# Patient Record
Sex: FEMALE | Race: OTHER
[De-identification: names, ages, dates, MRNs, and addresses within clinical notes are randomized per-mention and may not be internally consistent; named-entity substitution may affect disease eponyms.]

---

## 2017-06-08 ENCOUNTER — TRANSCRIPTION ENCOUNTER (OUTPATIENT)
Age: 52
End: 2017-06-08

## 2017-06-21 ENCOUNTER — TRANSCRIPTION ENCOUNTER (OUTPATIENT)
Age: 52
End: 2017-06-21

## 2018-04-13 ENCOUNTER — OUTPATIENT (OUTPATIENT)
Dept: OUTPATIENT SERVICES | Facility: HOSPITAL | Age: 53
LOS: 1 days | Discharge: HOME | End: 2018-04-13

## 2018-04-22 ENCOUNTER — TRANSCRIPTION ENCOUNTER (OUTPATIENT)
Age: 53
End: 2018-04-22

## 2018-06-13 ENCOUNTER — TRANSCRIPTION ENCOUNTER (OUTPATIENT)
Age: 53
End: 2018-06-13

## 2018-12-20 ENCOUNTER — MOBILE ON CALL (OUTPATIENT)
Age: 53
End: 2018-12-20

## 2019-01-08 ENCOUNTER — APPOINTMENT (OUTPATIENT)
Dept: GASTROENTEROLOGY | Facility: CLINIC | Age: 54
End: 2019-01-08
Payer: COMMERCIAL

## 2019-01-08 VITALS
WEIGHT: 169 LBS | SYSTOLIC BLOOD PRESSURE: 114 MMHG | BODY MASS INDEX: 29.95 KG/M2 | HEART RATE: 94 BPM | HEIGHT: 63 IN | DIASTOLIC BLOOD PRESSURE: 70 MMHG | OXYGEN SATURATION: 95 %

## 2019-01-08 DIAGNOSIS — Z78.9 OTHER SPECIFIED HEALTH STATUS: ICD-10-CM

## 2019-01-08 DIAGNOSIS — Z83.3 FAMILY HISTORY OF DIABETES MELLITUS: ICD-10-CM

## 2019-01-08 PROCEDURE — 99214 OFFICE O/P EST MOD 30 MIN: CPT

## 2019-01-08 NOTE — HISTORY OF PRESENT ILLNESS
[FreeTextEntry1] :  52 yo f with PMR on daily Prednisone , hypothyroidism, celiac disease, h/o colon polyps, h/o GERD. \par        presents for follow up.\par I saw her in Sept 2018 when she complained of worsening reflux. She was also less compliant with her gluten free diet at that time. Celiac serologies were elevated. \par \par EGD 10/19/18 for GERD and celiac,-small HH, and irregular zline\par duodenal bx- increase IEL\par gastric- chronic HP negative gastritis\par distal esophagus-c/w reflux\par proximal esophagus- no EoE \par \par Today, she complains of ongoing intermittent significant reflux that interferes with sleep. \par She has only been gluten free for past week. She takes ranitidine 150 mg at bedtime. \par She started methotrexate for PMR on  12/14, Prednisone also increased to 10 mg in December. \par Takes Advil PM at night to sleep 2x per week. \par still smoking\par no longer drinking alcohol. \par vomits on the night after taking methotrexate. \par more trouble with  constipation, takes Dulcolax PRN. \par no abd pain, brbpr/melena/weight loss\par \par   \par \par  \par         EGD/Colonoscopy 07/2017- \par         small hiatal hernia\par         LA class A esophagitis\par         colon polyps\par         diverticulosis\par         hemorrhoids \par         Rec: repeat colonoscopy in 3 years. \par         previous records:\par         labs 6/12/12-\par         DGP IgG-17, DGP IgA 6.3, TTG 43, anti- endomysial-positive, serum IgA-211\par         DQ2 -homozygous.\par

## 2019-01-08 NOTE — ASSESSMENT
[FreeTextEntry1] : GERD\par Persistent reflux not improved with H2RA. Likely exacerbated by chronic prednisone and NSAID use. Advised she avoid NSAIDS, counseled on antireflux diet, maintain gluten free diet, will start PPI, plan to attempt to taper in 3 months. \par \par Celiac disease\par Advised strict adherence to gluten free diet. Will repeat celiac serologies in 2 weeks when patient has blood work with rheumatologist scheduled. \par \par Constipation\par Start daily fiber supplement, increase hydration, MiraLax and Dulcolax PRN\par \par History of colon polyps\par Due for repeat colonoscopy in 07/2020

## 2019-01-08 NOTE — PHYSICAL EXAM
[General Appearance - Alert] : alert [General Appearance - In No Acute Distress] : in no acute distress [Sclera] : the sclera and conjunctiva were normal [Outer Ear] : the ears and nose were normal in appearance [Neck Appearance] : the appearance of the neck was normal [] : no respiratory distress [Apical Impulse] : the apical impulse was normal [Abdomen Soft] : soft [Abdomen Tenderness] : non-tender [Abnormal Walk] : normal gait [Skin Color & Pigmentation] : normal skin color and pigmentation [Oriented To Time, Place, And Person] : oriented to person, place, and time

## 2019-01-18 ENCOUNTER — RECORD ABSTRACTING (OUTPATIENT)
Age: 54
End: 2019-01-18

## 2019-01-18 DIAGNOSIS — Z86.39 PERSONAL HISTORY OF OTHER ENDOCRINE, NUTRITIONAL AND METABOLIC DISEASE: ICD-10-CM

## 2019-01-18 DIAGNOSIS — Z86.010 PERSONAL HISTORY OF COLONIC POLYPS: ICD-10-CM

## 2019-01-22 ENCOUNTER — RX RENEWAL (OUTPATIENT)
Age: 54
End: 2019-01-22

## 2019-01-23 ENCOUNTER — APPOINTMENT (OUTPATIENT)
Dept: RHEUMATOLOGY | Facility: CLINIC | Age: 54
End: 2019-01-23
Payer: COMMERCIAL

## 2019-01-23 VITALS
WEIGHT: 162 LBS | DIASTOLIC BLOOD PRESSURE: 72 MMHG | SYSTOLIC BLOOD PRESSURE: 118 MMHG | HEIGHT: 63 IN | BODY MASS INDEX: 28.7 KG/M2

## 2019-01-23 PROCEDURE — 99213 OFFICE O/P EST LOW 20 MIN: CPT

## 2019-01-23 PROCEDURE — XXXXX: CPT

## 2019-01-24 LAB
25(OH)D3 SERPL-MCNC: 22.2 NG/ML
ALBUMIN SERPL ELPH-MCNC: 4.6 G/DL
ALP BLD-CCNC: 63 U/L
ALT SERPL-CCNC: 13 U/L
ANION GAP SERPL CALC-SCNC: 13 MMOL/L
AST SERPL-CCNC: 15 U/L
BASOPHILS # BLD AUTO: 0.01 K/UL
BASOPHILS NFR BLD AUTO: 0.1 %
BILIRUB SERPL-MCNC: 0.2 MG/DL
BUN SERPL-MCNC: 17 MG/DL
CALCIUM SERPL-MCNC: 10 MG/DL
CHLORIDE SERPL-SCNC: 103 MMOL/L
CO2 SERPL-SCNC: 27 MMOL/L
CREAT SERPL-MCNC: 0.73 MG/DL
CRP SERPL-MCNC: 0.34 MG/DL
EOSINOPHIL # BLD AUTO: 0.09 K/UL
EOSINOPHIL NFR BLD AUTO: 1.1 %
ERYTHROCYTE [SEDIMENTATION RATE] IN BLOOD BY WESTERGREN METHOD: 12 MM/HR
GLUCOSE SERPL-MCNC: 83 MG/DL
HCT VFR BLD CALC: 40.9 %
HGB BLD-MCNC: 13.4 G/DL
IGA SER QL IEP: 149 MG/DL
IMM GRANULOCYTES NFR BLD AUTO: 0.3 %
LYMPHOCYTES # BLD AUTO: 2.03 K/UL
LYMPHOCYTES NFR BLD AUTO: 25.4 %
MAN DIFF?: NORMAL
MCHC RBC-ENTMCNC: 29.7 PG
MCHC RBC-ENTMCNC: 32.8 GM/DL
MCV RBC AUTO: 90.7 FL
MONOCYTES # BLD AUTO: 0.41 K/UL
MONOCYTES NFR BLD AUTO: 5.1 %
NEUTROPHILS # BLD AUTO: 5.43 K/UL
NEUTROPHILS NFR BLD AUTO: 68 %
PLATELET # BLD AUTO: 237 K/UL
POTASSIUM SERPL-SCNC: 4.7 MMOL/L
PROT SERPL-MCNC: 7.2 G/DL
RBC # BLD: 4.51 M/UL
RBC # FLD: 14.7 %
SODIUM SERPL-SCNC: 143 MMOL/L
WBC # FLD AUTO: 7.99 K/UL

## 2019-01-25 LAB
ENDOMYSIUM IGA SER QL: NEGATIVE
ENDOMYSIUM IGA TITR SER: NORMAL
TTG IGA SER IA-ACNC: 21.9 UNITS
TTG IGA SER-ACNC: ABNORMAL
TTG IGG SER IA-ACNC: <5 UNITS
TTG IGG SER IA-ACNC: NEGATIVE
VIT B12 SERPL-MCNC: 835 PG/ML

## 2019-01-28 LAB
GLIADIN IGA SER QL: 14.1 UNITS
GLIADIN IGG SER QL: <5 UNITS
GLIADIN PEPTIDE IGA SER-ACNC: NEGATIVE
GLIADIN PEPTIDE IGG SER-ACNC: NEGATIVE

## 2019-02-18 ENCOUNTER — RX RENEWAL (OUTPATIENT)
Age: 54
End: 2019-02-18

## 2019-03-01 ENCOUNTER — APPOINTMENT (OUTPATIENT)
Dept: RHEUMATOLOGY | Facility: CLINIC | Age: 54
End: 2019-03-01
Payer: COMMERCIAL

## 2019-03-01 VITALS
SYSTOLIC BLOOD PRESSURE: 120 MMHG | DIASTOLIC BLOOD PRESSURE: 70 MMHG | WEIGHT: 160 LBS | HEIGHT: 63 IN | BODY MASS INDEX: 28.35 KG/M2

## 2019-03-01 PROCEDURE — 99214 OFFICE O/P EST MOD 30 MIN: CPT

## 2019-03-01 RX ORDER — METHOTREXATE 2.5 MG/1
2.5 TABLET ORAL WEEKLY
Qty: 91 | Refills: 0 | Status: DISCONTINUED | COMMUNITY
Start: 2019-01-23 | End: 2019-03-01

## 2019-03-06 ENCOUNTER — APPOINTMENT (OUTPATIENT)
Dept: GASTROENTEROLOGY | Facility: CLINIC | Age: 54
End: 2019-03-06
Payer: COMMERCIAL

## 2019-03-06 VITALS
SYSTOLIC BLOOD PRESSURE: 110 MMHG | BODY MASS INDEX: 28.35 KG/M2 | HEIGHT: 63 IN | WEIGHT: 160 LBS | DIASTOLIC BLOOD PRESSURE: 60 MMHG | HEART RATE: 84 BPM

## 2019-03-06 PROCEDURE — 99214 OFFICE O/P EST MOD 30 MIN: CPT

## 2019-03-06 RX ORDER — LORATADINE 5 MG/5 ML
10 SOLUTION, ORAL ORAL
Refills: 0 | Status: DISCONTINUED | COMMUNITY
End: 2019-03-06

## 2019-03-06 RX ORDER — DICLOFENAC SODIUM 1 %
1 KIT TOPICAL
Refills: 0 | Status: DISCONTINUED | COMMUNITY
End: 2019-03-06

## 2019-03-06 NOTE — ASSESSMENT
[FreeTextEntry1] : 1+2) vomiting, abd pain - possibly gastroparesis, PUD, r/o complications of celiac dz, concern for lymphoma, partial obstructing process --Will get CT scan now; increase nexium to BID, grazing diet.\par \par 3) Celiac - cont GFD - recent TTG improved vs prior\par \par 4) GERD - Reviewed dietary and lifestyle modifications, including weight loss, smaller/frequent/earlier (>3h prior to lying down) meals, trials of cutting down/out caffeine, chocolate, tomatoes, fatty foods, alcohol.  Inc nexium to BID\par \par 5) hx colon polyps - due 7/2020 per records\par \par F/U after CT scan and w/ Dr. Miller at least by phone next wk when she returns.

## 2019-03-08 ENCOUNTER — RESULT REVIEW (OUTPATIENT)
Age: 54
End: 2019-03-08

## 2019-03-08 ENCOUNTER — RX RENEWAL (OUTPATIENT)
Age: 54
End: 2019-03-08

## 2019-03-11 ENCOUNTER — RX RENEWAL (OUTPATIENT)
Age: 54
End: 2019-03-11

## 2019-03-14 ENCOUNTER — APPOINTMENT (OUTPATIENT)
Dept: GASTROENTEROLOGY | Facility: CLINIC | Age: 54
End: 2019-03-14
Payer: COMMERCIAL

## 2019-03-14 ENCOUNTER — LABORATORY RESULT (OUTPATIENT)
Age: 54
End: 2019-03-14

## 2019-03-14 VITALS
HEART RATE: 102 BPM | SYSTOLIC BLOOD PRESSURE: 110 MMHG | HEIGHT: 63 IN | BODY MASS INDEX: 28 KG/M2 | DIASTOLIC BLOOD PRESSURE: 70 MMHG | WEIGHT: 158 LBS

## 2019-03-14 PROCEDURE — 99214 OFFICE O/P EST MOD 30 MIN: CPT

## 2019-03-15 LAB
ALBUMIN SERPL ELPH-MCNC: 4.5 G/DL
ALP BLD-CCNC: 72 U/L
ALT SERPL-CCNC: 20 U/L
ANION GAP SERPL CALC-SCNC: 16 MMOL/L
AST SERPL-CCNC: 17 U/L
BASOPHILS # BLD AUTO: 0.05 K/UL
BASOPHILS NFR BLD AUTO: 0.6 %
BILIRUB SERPL-MCNC: <0.2 MG/DL
BUN SERPL-MCNC: 16 MG/DL
CALCIUM SERPL-MCNC: 9.3 MG/DL
CHLORIDE SERPL-SCNC: 101 MMOL/L
CO2 SERPL-SCNC: 24 MMOL/L
CREAT SERPL-MCNC: 0.8 MG/DL
EOSINOPHIL # BLD AUTO: 0.04 K/UL
EOSINOPHIL NFR BLD AUTO: 0.5 %
GLUCOSE SERPL-MCNC: 133 MG/DL
HCT VFR BLD CALC: 45.5 %
HGB BLD-MCNC: 14.2 G/DL
IMM GRANULOCYTES NFR BLD AUTO: 0.5 %
LPL SERPL-CCNC: 36 U/L
LYMPHOCYTES # BLD AUTO: 1.44 K/UL
LYMPHOCYTES NFR BLD AUTO: 16.4 %
MAN DIFF?: NORMAL
MCHC RBC-ENTMCNC: 30.3 PG
MCHC RBC-ENTMCNC: 31.2 GM/DL
MCV RBC AUTO: 97.2 FL
MONOCYTES # BLD AUTO: 0.44 K/UL
MONOCYTES NFR BLD AUTO: 5 %
NEUTROPHILS # BLD AUTO: 6.77 K/UL
NEUTROPHILS NFR BLD AUTO: 77 %
PLATELET # BLD AUTO: 235 K/UL
POTASSIUM SERPL-SCNC: 4.2 MMOL/L
PROT SERPL-MCNC: 7.1 G/DL
RBC # BLD: 4.68 M/UL
RBC # FLD: 14 %
SODIUM SERPL-SCNC: 141 MMOL/L
TSH SERPL-ACNC: 0.41 UIU/ML
WBC # FLD AUTO: 8.78 K/UL

## 2019-03-17 NOTE — ASSESSMENT
[FreeTextEntry1] : Vomiting- CT scan unrevealing, possible gastroparesis, PUD although less likely on PPI and possible complications of celiac disease although less likely given only mildly elevated TTG on labs in 01/2019, also consider biliary colic.\par Will order labs, gastric emptying study, abdominal US and EGD.  Continue BID PPI. Small frequent meals, physical activity after dinner. Zofran PRN. \par \par Celiac - continue strict gluten free diet, will repeat celiac serologies. \par \par GERD- BID PPI, antireflux diet. \par \par h/o colon polyps - colonoscopy due in  7/2020 \par

## 2019-03-17 NOTE — HISTORY OF PRESENT ILLNESS
[FreeTextEntry1] : 53F with PMR (on Prednisone), hypothyroidism, celiac disease, h/o colon polyps, h/o GERD here for follow up for vomiting\par \par Today, she is 95% gluten free. Stopped MTX due to n/v ~ 1 month ago, 2 weeks ago developed vomiting at night. feels well during the day, nausea after dinner, wakes up at 3am with vomiting of undigested food. She saw Dr. Webb last week for the same complaint. PPI increased to BID which she does not believe helped\par CT A/P 3/8/19 with oral and IVC -no acute pathology, left punctate intrarenal calculus, mild colonic diverticulosis, possible hepatic steatosis. \par no abd pain. \par no weight loss. no hematemesis. no change in bowels. no brbpr/melena. \par no f/c. \par no nsaid use. \par \par Prior evaluations\par Sept 2018 GERD. Celiac serologies were elevated. \par EGD 10/19/18 for GERD and celiac,-small HH, and irregular zline\par duodenal bx- increase IEL\par gastric- chronic HP negative gastritis\par distal esophagus-c/w reflux\par proximal esophagus- no EoE \par \par US 2017 fatty liver\par Colonoscopy 07/2017-  colon polyps, diverticulosis, hemorrhoids. Rec: colonoscopy in 3 years. \par \par  labs 6/12/12-\par  DGP IgG-17, DGP IgA 6.3, TTG 43, anti- endomysial-positive, serum IgA-211\par  DQ2 -homozygous.\par

## 2019-03-17 NOTE — PHYSICAL EXAM
[General Appearance - Alert] : alert [General Appearance - In No Acute Distress] : in no acute distress [Sclera] : the sclera and conjunctiva were normal [Outer Ear] : the ears and nose were normal in appearance [Hearing Threshold Finger Rub Not Stearns] : hearing was normal [Neck Appearance] : the appearance of the neck was normal [] : no respiratory distress [Apical Impulse] : the apical impulse was normal [Abdomen Soft] : soft [Abdomen Tenderness] : non-tender [No CVA Tenderness] : no ~M costovertebral angle tenderness [Abnormal Walk] : normal gait [Skin Color & Pigmentation] : normal skin color and pigmentation [No Focal Deficits] : no focal deficits [Oriented To Time, Place, And Person] : oriented to person, place, and time

## 2019-03-19 LAB
ENDOMYSIUM IGA SER QL: NEGATIVE
ENDOMYSIUM IGA TITR SER: NORMAL
GLIADIN IGA SER QL: 12.3 UNITS
GLIADIN IGG SER QL: <5 UNITS
GLIADIN PEPTIDE IGA SER-ACNC: NEGATIVE
GLIADIN PEPTIDE IGG SER-ACNC: NEGATIVE
TTG IGA SER IA-ACNC: 3.7 U/ML
TTG IGA SER-ACNC: NEGATIVE
TTG IGG SER IA-ACNC: 1.7 U/ML
TTG IGG SER IA-ACNC: NEGATIVE

## 2019-03-27 ENCOUNTER — RESULT REVIEW (OUTPATIENT)
Age: 54
End: 2019-03-27

## 2019-04-02 ENCOUNTER — RX RENEWAL (OUTPATIENT)
Age: 54
End: 2019-04-02

## 2019-04-03 ENCOUNTER — RX RENEWAL (OUTPATIENT)
Age: 54
End: 2019-04-03

## 2019-04-09 ENCOUNTER — RESULT REVIEW (OUTPATIENT)
Age: 54
End: 2019-04-09

## 2019-04-09 ENCOUNTER — APPOINTMENT (OUTPATIENT)
Dept: GASTROENTEROLOGY | Facility: HOSPITAL | Age: 54
End: 2019-04-09

## 2019-04-09 ENCOUNTER — APPOINTMENT (OUTPATIENT)
Dept: OBGYN | Facility: CLINIC | Age: 54
End: 2019-04-09
Payer: COMMERCIAL

## 2019-04-09 ENCOUNTER — APPOINTMENT (OUTPATIENT)
Dept: RHEUMATOLOGY | Facility: CLINIC | Age: 54
End: 2019-04-09
Payer: COMMERCIAL

## 2019-04-09 VITALS
SYSTOLIC BLOOD PRESSURE: 120 MMHG | BODY MASS INDEX: 26.58 KG/M2 | DIASTOLIC BLOOD PRESSURE: 72 MMHG | HEIGHT: 63 IN | WEIGHT: 150 LBS

## 2019-04-09 VITALS
DIASTOLIC BLOOD PRESSURE: 80 MMHG | HEIGHT: 63 IN | BODY MASS INDEX: 26.58 KG/M2 | SYSTOLIC BLOOD PRESSURE: 110 MMHG | WEIGHT: 150 LBS

## 2019-04-09 PROCEDURE — 99213 OFFICE O/P EST LOW 20 MIN: CPT

## 2019-04-09 NOTE — REVIEW OF SYSTEMS
[Postmenopausal Bleeding] : postmenopausal bleeding [Nl] : Integumentary [Constipation] : constipation [Arthralgias] : arthralgias

## 2019-04-09 NOTE — HISTORY OF PRESENT ILLNESS
[FreeTextEntry1] : After the MTX she was still vomiting.  She saw GI (Dr. Miller) who wanted to perform several tests, as she worried that Breanna was not absorbing food.\par She started a new high protein diet and now she feels great.  She lost weight, is no longer achy and has no GI symptoms.  She is sleeping better and will go several hours\par She lowered her Prednisone to 7 mg daily.  She is taking Gabapentin 400 mg and 4 mg of Requip at night.\par She is taking Omega 3, calcium and multivitamins.\par She started bleeding vaginally yesterday and has not had a period in 5 years.  She will see GYN later.

## 2019-04-09 NOTE — PHYSICAL EXAM
[Normal] : uterus [Uterine Adnexae] : were not tender and not enlarged [FreeTextEntry4] : Light dark red vaginal bleeding

## 2019-04-11 ENCOUNTER — RX RENEWAL (OUTPATIENT)
Age: 54
End: 2019-04-11

## 2019-04-30 ENCOUNTER — APPOINTMENT (OUTPATIENT)
Dept: OBGYN | Facility: CLINIC | Age: 54
End: 2019-04-30
Payer: COMMERCIAL

## 2019-04-30 ENCOUNTER — APPOINTMENT (OUTPATIENT)
Dept: INTERNAL MEDICINE | Facility: CLINIC | Age: 54
End: 2019-04-30
Payer: COMMERCIAL

## 2019-04-30 ENCOUNTER — NON-APPOINTMENT (OUTPATIENT)
Age: 54
End: 2019-04-30

## 2019-04-30 VITALS
DIASTOLIC BLOOD PRESSURE: 70 MMHG | SYSTOLIC BLOOD PRESSURE: 100 MMHG | BODY MASS INDEX: 25.34 KG/M2 | HEIGHT: 63 IN | WEIGHT: 143 LBS

## 2019-04-30 VITALS
HEIGHT: 63 IN | SYSTOLIC BLOOD PRESSURE: 112 MMHG | DIASTOLIC BLOOD PRESSURE: 62 MMHG | WEIGHT: 143 LBS | BODY MASS INDEX: 25.34 KG/M2

## 2019-04-30 DIAGNOSIS — Z87.898 PERSONAL HISTORY OF OTHER SPECIFIED CONDITIONS: ICD-10-CM

## 2019-04-30 DIAGNOSIS — M35.3 POLYMYALGIA RHEUMATICA: ICD-10-CM

## 2019-04-30 DIAGNOSIS — Z87.19 PERSONAL HISTORY OF OTHER DISEASES OF THE DIGESTIVE SYSTEM: ICD-10-CM

## 2019-04-30 DIAGNOSIS — S46.012A STRAIN OF MUSCLE(S) AND TENDON(S) OF THE ROTATOR CUFF OF LEFT SHOULDER, INITIAL ENCOUNTER: ICD-10-CM

## 2019-04-30 PROCEDURE — 99396 PREV VISIT EST AGE 40-64: CPT | Mod: 25

## 2019-04-30 PROCEDURE — G0296 VISIT TO DETERM LDCT ELIG: CPT | Mod: 26

## 2019-04-30 PROCEDURE — 93000 ELECTROCARDIOGRAM COMPLETE: CPT

## 2019-04-30 PROCEDURE — 99396 PREV VISIT EST AGE 40-64: CPT

## 2019-04-30 PROCEDURE — 36415 COLL VENOUS BLD VENIPUNCTURE: CPT

## 2019-04-30 PROCEDURE — 99406 BEHAV CHNG SMOKING 3-10 MIN: CPT

## 2019-04-30 RX ORDER — ESOMEPRAZOLE MAGNESIUM 40 MG/1
40 CAPSULE, DELAYED RELEASE ORAL TWICE DAILY
Qty: 60 | Refills: 0 | Status: DISCONTINUED | COMMUNITY
Start: 2019-01-08 | End: 2019-04-30

## 2019-04-30 RX ORDER — ONDANSETRON 4 MG/1
4 TABLET ORAL EVERY 6 HOURS
Qty: 10 | Refills: 0 | Status: DISCONTINUED | COMMUNITY
Start: 2019-03-14 | End: 2019-04-30

## 2019-04-30 RX ORDER — ROPINIROLE 1 MG/1
1 TABLET, FILM COATED ORAL
Qty: 90 | Refills: 1 | Status: DISCONTINUED | COMMUNITY
Start: 2019-04-02 | End: 2019-04-30

## 2019-04-30 RX ORDER — FOLIC ACID 1 MG/1
1 TABLET ORAL
Qty: 90 | Refills: 1 | Status: DISCONTINUED | COMMUNITY
Start: 2019-04-02 | End: 2019-04-30

## 2019-04-30 NOTE — COUNSELING
[Healthy eating counseling provided] : healthy eating [Activity counseling provided] : activity [Tobacco Use Cessation Intermediate Greater Than 3 Minutes Up to 10 Minutes] : Tobacco Use Cessation Intermediate Greater Than 3 Minutes Up to 10 Minutes [ - Annual Lung Cancer Screening/Share Decision Making Discussion] : Annual Lung Cancer Screening/Share Decision Making Discussion. (I have advised this patient to have a Low Dose CT (LDCT) scan of the lungs and have discussed the following with the patient in a shared decision making discussion:   Benefits of Detection and Early Treatment: There is adequate evidence that annual screening for lung cancer with LDCT in a population of high-risk persons can prevent a substantial number of lung cancer–related deaths. The magnitude of benefit depends on the individual patient's risk for lung cancer, as those who are at highest risk are most likely to benefit. Screening cannot prevent most lung cancer–related deaths, and does not replace smoking cessation. Harms of Detection and Early Intervention and Treatment: The harms associated with LDCT screening include false-negative and false-positive results, incidental findings, over diagnosis, and radiation exposure. False-positive LDCT results occur in a substantial proportion of screened persons; 95% of all positive results do not lead to a diagnosis of cancer. In a high-quality screening program, further imaging can resolve most false-positive results; however, some patients may require invasive procedures. Radiation harms, including cancer resulting from cumulative exposure to radiation, vary depending on the age at the start of screening; the number of scans received; and the person's exposure to other sources of radiation, particularly other medical imaging.) [de-identified] : Pt counseled on proper diet and exercise. We discussed the importance of exercise in maintaining a healthy life style.\par Also counseled in detail on the benefits of quitting smoking

## 2019-04-30 NOTE — HEALTH RISK ASSESSMENT
[No falls in past year] : Patient reported no falls in the past year [0] : 2) Feeling down, depressed, or hopeless: Not at all (0) [HIV test declined] : HIV test declined [Hepatitis C test declined] : Hepatitis C test declined [Alone] : lives alone [Employed] : employed [] :  [Feels Safe at Home] : Feels safe at home [Fully functional (bathing, dressing, toileting, transferring, walking, feeding)] : Fully functional (bathing, dressing, toileting, transferring, walking, feeding) [Fully functional (using the telephone, shopping, preparing meals, housekeeping, doing laundry, using] : Fully functional and needs no help or supervision to perform IADLs (using the telephone, shopping, preparing meals, housekeeping, doing laundry, using transportation, managing medications and managing finances) [] : No [de-identified] : gym [de-identified] : keto, weight watchers [Reports changes in hearing] : Reports no changes in hearing [Reports changes in vision] : Reports no changes in vision [Reports changes in dental health] : Reports no changes in dental health [MammogramComments] : sending for mammo [PapSmearComments] : has appt with Dr Damon today [ColonoscopyDate] : 03/17 [ColonoscopyComments] : due in 3/2020 [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

## 2019-04-30 NOTE — PHYSICAL EXAM
[Normal Appearance] : normal in appearance [No Nipple Discharge] : no nipple discharge [No Axillary Lymphadenopathy] : no axillary lymphadenopathy [No Acute Distress] : no acute distress [Well Nourished] : well nourished [Well Developed] : well developed [Well-Appearing] : well-appearing [Normal Sclera/Conjunctiva] : normal sclera/conjunctiva [PERRL] : pupils equal round and reactive to light [EOMI] : extraocular movements intact [Normal Outer Ear/Nose] : the outer ears and nose were normal in appearance [Normal Oropharynx] : the oropharynx was normal [Normal TMs] : both tympanic membranes were normal [No JVD] : no jugular venous distention [Supple] : supple [No Lymphadenopathy] : no lymphadenopathy [Thyroid Normal, No Nodules] : the thyroid was normal and there were no nodules present [No Respiratory Distress] : no respiratory distress  [Clear to Auscultation] : lungs were clear to auscultation bilaterally [No Accessory Muscle Use] : no accessory muscle use [Normal Rate] : normal rate  [Regular Rhythm] : with a regular rhythm [Normal S1, S2] : normal S1 and S2 [No Murmur] : no murmur heard [No Carotid Bruits] : no carotid bruits [No Abdominal Bruit] : a ~M bruit was not heard ~T in the abdomen [No Varicosities] : no varicosities [Pedal Pulses Present] : the pedal pulses are present [No Edema] : there was no peripheral edema [No Extremity Clubbing/Cyanosis] : no extremity clubbing/cyanosis [No Palpable Aorta] : no palpable aorta [Soft] : abdomen soft [Non Tender] : non-tender [Non-distended] : non-distended [No Masses] : no abdominal mass palpated [No HSM] : no HSM [Normal Bowel Sounds] : normal bowel sounds [Normal Posterior Cervical Nodes] : no posterior cervical lymphadenopathy [Normal Anterior Cervical Nodes] : no anterior cervical lymphadenopathy [No CVA Tenderness] : no CVA  tenderness [No Spinal Tenderness] : no spinal tenderness [No Joint Swelling] : no joint swelling [Grossly Normal Strength/Tone] : grossly normal strength/tone [No Rash] : no rash [Normal Gait] : normal gait [Coordination Grossly Intact] : coordination grossly intact [No Focal Deficits] : no focal deficits [Normal Affect] : the affect was normal [Alert and Oriented x3] : oriented to person, place, and time [Normal Mood] : the mood was normal [Normal Insight/Judgement] : insight and judgment were intact [de-identified] : fibrocystic feel to both breasts but more in right breast, no distinct nodule [de-identified] : many moles on sun exposed skin

## 2019-04-30 NOTE — PHYSICAL EXAM
[Vulvar Atrophy] : vulvar atrophy [Normal] : uterus [Atrophy] : atrophy [No Bleeding] : there was no active vaginal bleeding [Uterine Adnexae] : were not tender and not enlarged

## 2019-04-30 NOTE — HISTORY OF PRESENT ILLNESS
[FreeTextEntry1] : annual [de-identified] : Pt here for annual physical. She follows with Dr Berger and is on chronic steroids for PMR and fibromyalgia. She was given methotrexate but did not tolerate it due to GI side effects. A month after being off of it she was still having GI issues. She saw Dr Barahona and had an u/s abd which was overall wnl. She started the keto diet and now feels much better overall.  Her aches are even better. She states she has lost 21 lb since starting this diet. (10lb since last annual.) She is going to the GYM regularly. \par pt also got a period that lasted for 1 week at the beginning of this month, after not having a period for 5 years.

## 2019-05-02 LAB
25(OH)D3 SERPL-MCNC: 33.7 NG/ML
CHOLEST SERPL-MCNC: 207 MG/DL
CHOLEST/HDLC SERPL: 4.1 RATIO
HDLC SERPL-MCNC: 51 MG/DL
HPV HIGH+LOW RISK DNA PNL CVX: NOT DETECTED
LDLC SERPL CALC-MCNC: 128 MG/DL
MEV IGG FLD QL IA: >300 AU/ML
MEV IGG+IGM SER-IMP: POSITIVE
MUV AB SER-ACNC: POSITIVE
MUV IGG SER QL IA: 108 AU/ML
RUBV IGG FLD-ACNC: 4.9 INDEX
RUBV IGG SER-IMP: POSITIVE
TRIGL SERPL-MCNC: 138 MG/DL

## 2019-05-07 ENCOUNTER — LABORATORY RESULT (OUTPATIENT)
Age: 54
End: 2019-05-07

## 2019-05-07 ENCOUNTER — OTHER (OUTPATIENT)
Age: 54
End: 2019-05-07

## 2019-05-08 ENCOUNTER — RX RENEWAL (OUTPATIENT)
Age: 54
End: 2019-05-08

## 2019-05-09 LAB
APPEARANCE: CLEAR
BILIRUBIN URINE: NEGATIVE
BLOOD URINE: NEGATIVE
COLOR: YELLOW
GLUCOSE QUALITATIVE U: NEGATIVE
KETONES URINE: ABNORMAL
LEUKOCYTE ESTERASE URINE: ABNORMAL
NITRITE URINE: NEGATIVE
PH URINE: 6.5
PROTEIN URINE: NEGATIVE
SPECIFIC GRAVITY URINE: 1.01
UROBILINOGEN URINE: NORMAL

## 2019-05-13 LAB — BACTERIA UR CULT: ABNORMAL

## 2019-05-20 NOTE — ASSESSMENT
[FreeTextEntry1] : Increase Roniorole to 4 mg and take earlier in the evening.  If no improvement after 1 week, increase Gabapentin to  500 mg at night.

## 2019-05-20 NOTE — HISTORY OF PRESENT ILLNESS
[FreeTextEntry1] : She is off MTX.\par Yesterday morning she woke up achy, but that resolved after an hour.\par She is stiff getting out of the bed.\par She takes Gabapentin 400 mg and Ropinorole 4 mg at night for RLS, but notes that on some days she gets breakthroughs.\par No headaches, blurry vision, scalp or  temporal tenderness.

## 2019-05-20 NOTE — HISTORY OF PRESENT ILLNESS
[FreeTextEntry1] : She has been following gluten free diet since early Jan\par She takes Gabapentin 400 mg and Ropinorole 3 mg  at night, which helps.\par She has poor sleep at night.\par She notes on week 3 and 4 of taking MTX she would vomit in the middle of the night and then go back to bed.

## 2019-05-29 ENCOUNTER — RX RENEWAL (OUTPATIENT)
Age: 54
End: 2019-05-29

## 2019-06-06 ENCOUNTER — APPOINTMENT (OUTPATIENT)
Dept: RHEUMATOLOGY | Facility: CLINIC | Age: 54
End: 2019-06-06
Payer: COMMERCIAL

## 2019-06-06 VITALS
WEIGHT: 135 LBS | BODY MASS INDEX: 23.92 KG/M2 | HEIGHT: 63 IN | SYSTOLIC BLOOD PRESSURE: 112 MMHG | DIASTOLIC BLOOD PRESSURE: 72 MMHG

## 2019-06-06 PROCEDURE — 99213 OFFICE O/P EST LOW 20 MIN: CPT

## 2019-06-06 NOTE — HISTORY OF PRESENT ILLNESS
[FreeTextEntry1] : She increased Prednisone from 6 mg to 7 mg bc she was feeling achy, and notes that the higher dose made her feel better.\par As long as she follows her diet she feels pretty good.  \par She goes to the gym 6 nights a week.\par She takes Gabapentin 400 mg and Ropinorole 3 mg at night, which helps.

## 2019-06-12 LAB — CYTOLOGY CVX/VAG DOC THIN PREP: NORMAL

## 2019-07-17 ENCOUNTER — RESULT REVIEW (OUTPATIENT)
Age: 54
End: 2019-07-17

## 2019-07-17 ENCOUNTER — APPOINTMENT (OUTPATIENT)
Dept: RHEUMATOLOGY | Facility: CLINIC | Age: 54
End: 2019-07-17
Payer: COMMERCIAL

## 2019-07-17 VITALS
DIASTOLIC BLOOD PRESSURE: 60 MMHG | SYSTOLIC BLOOD PRESSURE: 104 MMHG | WEIGHT: 125 LBS | BODY MASS INDEX: 22.15 KG/M2 | HEIGHT: 63 IN

## 2019-07-17 PROCEDURE — 99213 OFFICE O/P EST LOW 20 MIN: CPT

## 2019-07-17 RX ORDER — SULFAMETHOXAZOLE AND TRIMETHOPRIM 800; 160 MG/1; MG/1
800-160 TABLET ORAL TWICE DAILY
Qty: 10 | Refills: 0 | Status: DISCONTINUED | COMMUNITY
Start: 2019-05-08 | End: 2019-07-17

## 2019-07-17 RX ORDER — PREDNISONE 1 MG/1
1 TABLET ORAL DAILY
Qty: 60 | Refills: 3 | Status: DISCONTINUED | COMMUNITY
Start: 2019-04-09 | End: 2019-07-17

## 2019-07-18 NOTE — HISTORY OF PRESENT ILLNESS
[FreeTextEntry1] : She is on Prednisone 6 mg daily for the past month.  No change in her symptoms with lowering the dose of Prednisone.  No worsening in her symptoms with change in dose of Prednisone.\par She takes Gabapentin 400 mg and Ropinorole 3 mg at night.  RLS not keeping her up at night.  \par No headache, blurry vision, jaw or scalp pain.

## 2019-08-19 ENCOUNTER — RX RENEWAL (OUTPATIENT)
Age: 54
End: 2019-08-19

## 2019-09-05 ENCOUNTER — APPOINTMENT (OUTPATIENT)
Dept: RHEUMATOLOGY | Facility: CLINIC | Age: 54
End: 2019-09-05
Payer: COMMERCIAL

## 2019-09-05 VITALS
SYSTOLIC BLOOD PRESSURE: 100 MMHG | DIASTOLIC BLOOD PRESSURE: 60 MMHG | WEIGHT: 123 LBS | HEIGHT: 63 IN | BODY MASS INDEX: 21.79 KG/M2

## 2019-09-05 PROCEDURE — 99214 OFFICE O/P EST MOD 30 MIN: CPT

## 2019-09-05 RX ORDER — PREDNISONE 1 MG/1
1 TABLET ORAL
Qty: 90 | Refills: 0 | Status: DISCONTINUED | COMMUNITY
Start: 2019-07-17 | End: 2019-09-05

## 2019-09-05 NOTE — HISTORY OF PRESENT ILLNESS
[FreeTextEntry1] : She has numbness in her right lateral leg and inside of foot.  She has a right foot drop, which is causing her to drag her right foot while walking.  She saw her back surgeon and she had an MRI which showed a bone pushing on her spinal cord.  She will have surgery at [facility] to have it shaved down.\par She is taking Prednisone 5 mg daily.\par She lost 45 pounds and feels great otherwise.\par

## 2019-09-09 ENCOUNTER — RESULT REVIEW (OUTPATIENT)
Age: 54
End: 2019-09-09

## 2019-09-09 ENCOUNTER — APPOINTMENT (OUTPATIENT)
Dept: INTERNAL MEDICINE | Facility: CLINIC | Age: 54
End: 2019-09-09
Payer: COMMERCIAL

## 2019-09-09 ENCOUNTER — NON-APPOINTMENT (OUTPATIENT)
Age: 54
End: 2019-09-09

## 2019-09-09 VITALS
BODY MASS INDEX: 22.15 KG/M2 | SYSTOLIC BLOOD PRESSURE: 110 MMHG | WEIGHT: 125 LBS | DIASTOLIC BLOOD PRESSURE: 68 MMHG | HEIGHT: 63 IN

## 2019-09-09 DIAGNOSIS — Z86.39 PERSONAL HISTORY OF OTHER ENDOCRINE, NUTRITIONAL AND METABOLIC DISEASE: ICD-10-CM

## 2019-09-09 DIAGNOSIS — Z00.00 ENCOUNTER FOR GENERAL ADULT MEDICAL EXAMINATION W/OUT ABNORMAL FINDINGS: ICD-10-CM

## 2019-09-09 DIAGNOSIS — Z12.39 ENCOUNTER FOR OTHER SCREENING FOR MALIGNANT NEOPLASM OF BREAST: ICD-10-CM

## 2019-09-09 DIAGNOSIS — Z87.42 PERSONAL HISTORY OF OTHER DISEASES OF THE FEMALE GENITAL TRACT: ICD-10-CM

## 2019-09-09 DIAGNOSIS — Z01.419 ENCOUNTER FOR GYNECOLOGICAL EXAMINATION (GENERAL) (ROUTINE) W/OUT ABNORMAL FINDINGS: ICD-10-CM

## 2019-09-09 DIAGNOSIS — R39.9 UNSPECIFIED SYMPTOMS AND SIGNS INVOLVING THE GENITOURINARY SYSTEM: ICD-10-CM

## 2019-09-09 PROCEDURE — 99203 OFFICE O/P NEW LOW 30 MIN: CPT | Mod: 25

## 2019-09-09 PROCEDURE — 99406 BEHAV CHNG SMOKING 3-10 MIN: CPT

## 2019-09-09 PROCEDURE — 36415 COLL VENOUS BLD VENIPUNCTURE: CPT

## 2019-09-09 PROCEDURE — 93000 ELECTROCARDIOGRAM COMPLETE: CPT

## 2019-09-09 PROCEDURE — 99213 OFFICE O/P EST LOW 20 MIN: CPT | Mod: 25

## 2019-09-09 NOTE — COUNSELING
[Discussed Risks and Advised to Quit Smoking] : Discussed risks and advised to quit smoking [Discussed Cessation Strategies] : cessation strategies were discussed [Quit Smoking] : Quit Smoking [Tobacco Use Cessation Intermediate Greater Than 3 Minutes Up to 10 Minutes] : Tobacco Use Cessation Intermediate Greater Than 3 Minutes Up to 10 Minutes

## 2019-09-09 NOTE — PHYSICAL EXAM
[Normal] : no respiratory distress, lungs were clear to auscultation bilaterally and no accessory muscle use [Normal Affect] : the affect was normal [No Edema] : there was no peripheral edema [Normal Mood] : the mood was normal [Alert and Oriented x3] : oriented to person, place, and time [Normal Insight/Judgement] : insight and judgment were intact

## 2019-09-10 LAB
ABO + RH PNL BLD: NORMAL
ALBUMIN SERPL ELPH-MCNC: 4.4 G/DL
ALP BLD-CCNC: 59 U/L
ALT SERPL-CCNC: 14 U/L
ANION GAP SERPL CALC-SCNC: 11 MMOL/L
APPEARANCE: CLEAR
APTT BLD: 28.4 SEC
AST SERPL-CCNC: 18 U/L
BASOPHILS # BLD AUTO: 0.04 K/UL
BASOPHILS NFR BLD AUTO: 0.5 %
BILIRUB SERPL-MCNC: 0.2 MG/DL
BILIRUBIN URINE: NEGATIVE
BLOOD URINE: NEGATIVE
BUN SERPL-MCNC: 7 MG/DL
CALCIUM SERPL-MCNC: 9.9 MG/DL
CHLORIDE SERPL-SCNC: 102 MMOL/L
CO2 SERPL-SCNC: 28 MMOL/L
COLOR: YELLOW
CREAT SERPL-MCNC: 0.79 MG/DL
EOSINOPHIL # BLD AUTO: 0.03 K/UL
EOSINOPHIL NFR BLD AUTO: 0.4 %
GLUCOSE QUALITATIVE U: NEGATIVE
GLUCOSE SERPL-MCNC: 90 MG/DL
HCT VFR BLD CALC: 43.1 %
HGB BLD-MCNC: 14 G/DL
IMM GRANULOCYTES NFR BLD AUTO: 0.3 %
INR PPP: 0.94 RATIO
KETONES URINE: NEGATIVE
LEUKOCYTE ESTERASE URINE: NEGATIVE
LYMPHOCYTES # BLD AUTO: 1.04 K/UL
LYMPHOCYTES NFR BLD AUTO: 14.2 %
MAN DIFF?: NORMAL
MCHC RBC-ENTMCNC: 31 PG
MCHC RBC-ENTMCNC: 32.5 GM/DL
MCV RBC AUTO: 95.4 FL
MONOCYTES # BLD AUTO: 0.33 K/UL
MONOCYTES NFR BLD AUTO: 4.5 %
NEUTROPHILS # BLD AUTO: 5.88 K/UL
NEUTROPHILS NFR BLD AUTO: 80.1 %
NITRITE URINE: NEGATIVE
PH URINE: 7
PLATELET # BLD AUTO: 282 K/UL
POTASSIUM SERPL-SCNC: 5 MMOL/L
PROT SERPL-MCNC: 6.7 G/DL
PROTEIN URINE: NEGATIVE
PT BLD: 10.7 SEC
RBC # BLD: 4.52 M/UL
RBC # FLD: 13 %
SODIUM SERPL-SCNC: 141 MMOL/L
SPECIFIC GRAVITY URINE: 1.01
UROBILINOGEN URINE: NORMAL
WBC # FLD AUTO: 7.34 K/UL

## 2019-09-10 NOTE — ASSESSMENT
[High Risk Surgery - Intraperitoneal, Intrathoracic or Supringuinal Vascular Procedures] : High Risk Surgery - Intraperitoneal, Intrathoracic or Supringuinal Vascular Procedures - No (0) [Ischemic Heart Disease] : Ischemic Heart Disease - No (0) [Congestive Heart Failure] : Congestive Heart Failure - No (0) [Prior Cerebrovascular Accident or TIA] : Prior Cerebrovascular Accident or TIA - No (0) [Creatinine >= 2mg/dL (1 Point)] : Creatinine >= 2mg/dL - No (0) [Insulin-dependent Diabetic (1 Point)] : Insulin-dependent Diabetic - No (0) [0] : 0 , RCRI Class: I, Risk of Post-Op Cardiac Complications: 0.4%, Procedure Risk: Low-Risk [Patient Optimized for Surgery] : Patient optimized for surgery [No Further Testing Recommended] : no further testing recommended [FreeTextEntry4] : At this time there are no medical contraindications to planned procedure. A laminectomy and foraminotomy as well as facetectomy is planned at L4-L5. Pt may proceed at acceptable risk.\par

## 2019-09-10 NOTE — REVIEW OF SYSTEMS
[Negative] : Psychiatric [Fever] : no fever [FreeTextEntry2] : 45 lb weight loss since Jan through the keto diet and exercise [de-identified] : right foot drop

## 2019-09-10 NOTE — HISTORY OF PRESENT ILLNESS
[No Pertinent Cardiac History] : no history of aortic stenosis, atrial fibrillation, coronary artery disease, recent myocardial infarction, or implantable device/pacemaker [Smoker] : smoker [No Adverse Anesthesia Reaction] : no adverse anesthesia reaction in self or family member [(Patient denies any chest pain, claudication, dyspnea on exertion, orthopnea, palpitations or syncope)] : Patient denies any chest pain, claudication, dyspnea on exertion, orthopnea, palpitations or syncope [Good (7-10 METs)] : Good (7-10 METs) [Asthma] : no asthma [COPD] : no COPD [Sleep Apnea] : no sleep apnea [Chronic Anticoagulation] : no chronic anticoagulation [Diabetes] : no diabetes [Chronic Kidney Disease] : no chronic kidney disease [FreeTextEntry1] : Back surgery  [FreeTextEntry2] : 09/23/2019 [FreeTextEntry3] : Dr Fierro Mat [FreeTextEntry4] : Pt says about 1 month ago she noticed that she was walking differently. Seems to have a right foot drop. She also had numbness on medial right foot and laterally on the right leg. She had an MRI done and had compression on nerve in her lumbar spine. \par Pt is smoker but has no SOB on exertion. No chest pain. She goes to the GYM 5 nights per week and is able to do the treadmill for 20 minutes, fast walking.  [FreeTextEntry7] : EKG 9/9/19 and 4/30/19

## 2019-09-20 VITALS
SYSTOLIC BLOOD PRESSURE: 103 MMHG | HEIGHT: 63 IN | RESPIRATION RATE: 16 BRPM | TEMPERATURE: 97 F | DIASTOLIC BLOOD PRESSURE: 51 MMHG | HEART RATE: 97 BPM | OXYGEN SATURATION: 98 % | WEIGHT: 129.63 LBS

## 2019-09-20 NOTE — H&P ADULT - HISTORY OF PRESENT ILLNESS
54F c/o low back pain x       Present for elective right L4-L5 laminectomy 54F c/o foot drop and R leg numbness x 2 months worsened over time without improvement. Failed conservative treatments. Ambulates without assist. Pt denies any recent fevers/chills, chest pain, or shortness of breath. Denies history of DVT/PE. Presents today for elective right L4-L5 laminectomy

## 2019-09-20 NOTE — H&P ADULT - NSICDXPASTMEDICALHX_GEN_ALL_CORE_FT
PAST MEDICAL HISTORY:  Fibromyalgia     GERD (gastroesophageal reflux disease)     Hypothyroid     Spinal stenosis of lumbar region

## 2019-09-20 NOTE — H&P ADULT - NSHPLABSRESULTS_GEN_ALL_CORE
Preop CBC, BMP, PT/PTT/INR, UA - WNL per medical clearance   Preop EKG - sinus rhythm - WNL per medical clearance   Preop CXR - WNL per medical clearance

## 2019-09-20 NOTE — H&P ADULT - NSHPPHYSICALEXAM_GEN_ALL_CORE
+ decreased ROM 2/2 pain, lumbar spine     Remainder of exam per medical clearance note NAD, sitting comfortably in chair  MSK: Decreased ROM of lumbar spine secondary to pain  RLE - decreased sensation to medial foot and lateral lower leg, FHL/GS 5/5, EHL/TA 0/5, 2+ DP   LLE - sensation intact to light touch, DP 2+, EHL/FHL/TA/GS 5/5, 2+ DP  Rest of PE per medical clearance

## 2019-09-20 NOTE — H&P ADULT - PROBLEM SELECTOR PLAN 1
Admit to Orthopaedic Service.  Presents today for elective  Right Laminectomy L4-L5  Pt medically stable and cleared for procedure today by Dr. Moctezuma

## 2019-09-23 ENCOUNTER — TRANSCRIPTION ENCOUNTER (OUTPATIENT)
Age: 54
End: 2019-09-23

## 2019-09-23 ENCOUNTER — INPATIENT (INPATIENT)
Facility: HOSPITAL | Age: 54
LOS: 0 days | Discharge: HOME | DRG: 520 | End: 2019-09-24
Payer: COMMERCIAL

## 2019-09-23 DIAGNOSIS — M48.061 SPINAL STENOSIS, LUMBAR REGION WITHOUT NEUROGENIC CLAUDICATION: ICD-10-CM

## 2019-09-23 DIAGNOSIS — Z41.9 ENCOUNTER FOR PROCEDURE FOR PURPOSES OTHER THAN REMEDYING HEALTH STATE, UNSPECIFIED: Chronic | ICD-10-CM

## 2019-09-23 DIAGNOSIS — K21.9 GASTRO-ESOPHAGEAL REFLUX DISEASE WITHOUT ESOPHAGITIS: ICD-10-CM

## 2019-09-23 DIAGNOSIS — M75.120 COMPLETE ROTATOR CUFF TEAR OR RUPTURE OF UNSPECIFIED SHOULDER, NOT SPECIFIED AS TRAUMATIC: Chronic | ICD-10-CM

## 2019-09-23 DIAGNOSIS — E03.9 HYPOTHYROIDISM, UNSPECIFIED: ICD-10-CM

## 2019-09-23 DIAGNOSIS — M79.7 FIBROMYALGIA: ICD-10-CM

## 2019-09-23 RX ORDER — ONDANSETRON 8 MG/1
4 TABLET, FILM COATED ORAL EVERY 4 HOURS
Refills: 0 | Status: DISCONTINUED | OUTPATIENT
Start: 2019-09-23 | End: 2019-09-24

## 2019-09-23 RX ORDER — METOCLOPRAMIDE HCL 10 MG
10 TABLET ORAL EVERY 8 HOURS
Refills: 0 | Status: DISCONTINUED | OUTPATIENT
Start: 2019-09-23 | End: 2019-09-24

## 2019-09-23 RX ORDER — SODIUM CHLORIDE 9 MG/ML
1000 INJECTION, SOLUTION INTRAVENOUS
Refills: 0 | Status: DISCONTINUED | OUTPATIENT
Start: 2019-09-23 | End: 2019-09-24

## 2019-09-23 RX ORDER — HYDROMORPHONE HYDROCHLORIDE 2 MG/ML
0.5 INJECTION INTRAMUSCULAR; INTRAVENOUS; SUBCUTANEOUS
Refills: 0 | Status: DISCONTINUED | OUTPATIENT
Start: 2019-09-23 | End: 2019-09-24

## 2019-09-23 RX ORDER — ROPINIROLE 8 MG/1
4 TABLET, FILM COATED, EXTENDED RELEASE ORAL AT BEDTIME
Refills: 0 | Status: DISCONTINUED | OUTPATIENT
Start: 2019-09-23 | End: 2019-09-23

## 2019-09-23 RX ORDER — OXYCODONE HYDROCHLORIDE 5 MG/1
10 TABLET ORAL EVERY 4 HOURS
Refills: 0 | Status: DISCONTINUED | OUTPATIENT
Start: 2019-09-23 | End: 2019-09-24

## 2019-09-23 RX ORDER — OXYCODONE HYDROCHLORIDE 5 MG/1
5 TABLET ORAL EVERY 4 HOURS
Refills: 0 | Status: DISCONTINUED | OUTPATIENT
Start: 2019-09-23 | End: 2019-09-24

## 2019-09-23 RX ORDER — DIPHENHYDRAMINE HCL 50 MG
50 CAPSULE ORAL EVERY 4 HOURS
Refills: 0 | Status: DISCONTINUED | OUTPATIENT
Start: 2019-09-23 | End: 2019-09-24

## 2019-09-23 RX ORDER — GABAPENTIN 400 MG/1
400 CAPSULE ORAL AT BEDTIME
Refills: 0 | Status: DISCONTINUED | OUTPATIENT
Start: 2019-09-23 | End: 2019-09-24

## 2019-09-23 RX ORDER — GABAPENTIN 400 MG/1
400 CAPSULE ORAL AT BEDTIME
Refills: 0 | Status: DISCONTINUED | OUTPATIENT
Start: 2019-09-23 | End: 2019-09-23

## 2019-09-23 RX ORDER — LEVOTHYROXINE SODIUM 125 MCG
112 TABLET ORAL DAILY
Refills: 0 | Status: DISCONTINUED | OUTPATIENT
Start: 2019-09-23 | End: 2019-09-24

## 2019-09-23 RX ORDER — DOCUSATE SODIUM 100 MG
100 CAPSULE ORAL THREE TIMES A DAY
Refills: 0 | Status: DISCONTINUED | OUTPATIENT
Start: 2019-09-23 | End: 2019-09-24

## 2019-09-23 RX ORDER — LEVOTHYROXINE SODIUM 125 MCG
112 TABLET ORAL DAILY
Refills: 0 | Status: DISCONTINUED | OUTPATIENT
Start: 2019-09-23 | End: 2019-09-23

## 2019-09-23 RX ORDER — MAGNESIUM HYDROXIDE 400 MG/1
30 TABLET, CHEWABLE ORAL EVERY 12 HOURS
Refills: 0 | Status: DISCONTINUED | OUTPATIENT
Start: 2019-09-23 | End: 2019-09-24

## 2019-09-23 RX ORDER — ROPINIROLE 8 MG/1
4 TABLET, FILM COATED, EXTENDED RELEASE ORAL AT BEDTIME
Refills: 0 | Status: DISCONTINUED | OUTPATIENT
Start: 2019-09-23 | End: 2019-09-24

## 2019-09-23 RX ORDER — ACETAMINOPHEN 500 MG
650 TABLET ORAL EVERY 6 HOURS
Refills: 0 | Status: DISCONTINUED | OUTPATIENT
Start: 2019-09-23 | End: 2019-09-24

## 2019-09-23 RX ADMIN — ROPINIROLE 4 MILLIGRAM(S): 8 TABLET, FILM COATED, EXTENDED RELEASE ORAL at 22:23

## 2019-09-23 RX ADMIN — OXYCODONE HYDROCHLORIDE 10 MILLIGRAM(S): 5 TABLET ORAL at 22:26

## 2019-09-23 RX ADMIN — OXYCODONE HYDROCHLORIDE 10 MILLIGRAM(S): 5 TABLET ORAL at 23:00

## 2019-09-23 RX ADMIN — Medication 100 MILLIGRAM(S): at 21:24

## 2019-09-23 RX ADMIN — Medication 100 MILLIGRAM(S): at 18:34

## 2019-09-23 RX ADMIN — Medication 50 MILLIGRAM(S): at 18:34

## 2019-09-23 RX ADMIN — GABAPENTIN 400 MILLIGRAM(S): 400 CAPSULE ORAL at 21:24

## 2019-09-23 NOTE — DISCHARGE NOTE PROVIDER - NSDCCPCAREPLAN_GEN_ALL_CORE_FT
PRINCIPAL DISCHARGE DIAGNOSIS  Diagnosis: Spinal stenosis of lumbar region  Assessment and Plan of Treatment: Spinal stenosis of lumbar region

## 2019-09-23 NOTE — DISCHARGE NOTE PROVIDER - CARE PROVIDER_API CALL
Vadim Oconnor)  Orthopaedic Surgery  [address] [place] [address]  Phone: [phone]  Fax: [phone]  Follow Up Time:

## 2019-09-23 NOTE — PROGRESS NOTE ADULT - SUBJECTIVE AND OBJECTIVE BOX
POST OPERATIVE DAY #:  0  STATUS POST: s/p L4-5 Lamiectomy                    SUBJECTIVE: Patient seen and examined. States to mild pain controlled. Patient denies any CP, SOB, fever, chills, numbness/tingling.     Pain:  well controlled      OBJECTIVE:     Vital Signs Last 24 Hrs  T(C): 36.1 (23 Sep 2019 13:00), Max: 36.3 (23 Sep 2019 11:00)  T(F): 97 (23 Sep 2019 13:00), Max: 97.4 (23 Sep 2019 11:00)  HR: 82 (23 Sep 2019 13:30) (64 - 100)  BP: 96/61 (23 Sep 2019 13:30) (94/52 - 125/72)  BP(mean): 77 (23 Sep 2019 13:30) (71 - 86)  RR: 21 (23 Sep 2019 13:30) (5 - 21)  SpO2: 95% (23 Sep 2019 13:30) (94% - 99%)    Affected extremity:          Dressing: clean/dry/intact          HVx 1 intact         Sensation: intact to light touch          Motor exam: Left EHL/TA/GS 5/5, Right EHL/TA 2/5, GS 4+/5         warm, well-perfused; capillary refill < 3 seconds              I&O's Detail    23 Sep 2019 07:01  -  23 Sep 2019 13:58  --------------------------------------------------------  IN:  Total IN: 0 mL    OUT:  Total OUT: 0 mL    Total NET: 0 mL          LABS:                MEDICATIONS:  clindamycin IVPB 900 milliGRAM(s) IV Intermittent every 8 hours    acetaminophen   Tablet .. 650 milliGRAM(s) Oral every 6 hours PRN  gabapentin 400 milliGRAM(s) Oral at bedtime  HYDROmorphone  Injectable 0.5 milliGRAM(s) IV Push every 3 hours PRN  metoclopramide Injectable 10 milliGRAM(s) IV Push every 8 hours PRN  ondansetron   Disintegrating Tablet 4 milliGRAM(s) Oral every 4 hours PRN  oxyCODONE    IR 5 milliGRAM(s) Oral every 4 hours PRN  oxyCODONE    IR 10 milliGRAM(s) Oral every 4 hours PRN  rOPINIRole 4 milliGRAM(s) Oral at bedtime          ASSESSMENT AND PLAN:   s/p L4-5 Lamiectomy      1. Analgesic pain control  2. DVT prophylaxis:      SCDs      3. Continue PT    4. Weight Bearing Status:  Weight bearing as tolerated       5. Disposition: Pending PT eval

## 2019-09-23 NOTE — DISCHARGE NOTE PROVIDER - NSDCFUADDINST_GEN_ALL_CORE_FT
No strenuous activity (bending/twisting), heavy lifting, driving or returning to work until cleared by MD.  Change dressing daily with gauze/tape till post-op day #5, then leave incision open to air.  You may shower post-op day#5, keep incision clean and dry.   Try to have regular bowel movements, take stool softener or laxative if necessary.  May take pepcid or zantac for upset stomach.  May apply ice to affected areas to decrease swelling.  Call to schedule an appt with Dr. Oconnor for follow up, if you have staples or sutures they will be removed in office.  Contact your doctor if you experience: fever greater than 101.5, chills, chest pain, difficulty breathing, redness or excessive drainage around the incision, other concerns.  Follow up with your primary care provider.

## 2019-09-23 NOTE — DISCHARGE NOTE PROVIDER - HOSPITAL COURSE
Admitted     Surgery 09/23/2019 - L4-5 laminectomy    Toyin-op Antibiotics    Pain control    DVT prophylaxis    OOB/Physical Therapy

## 2019-09-24 ENCOUNTER — TRANSCRIPTION ENCOUNTER (OUTPATIENT)
Age: 54
End: 2019-09-24

## 2019-09-24 VITALS
SYSTOLIC BLOOD PRESSURE: 98 MMHG | RESPIRATION RATE: 17 BRPM | DIASTOLIC BLOOD PRESSURE: 61 MMHG | TEMPERATURE: 98 F | OXYGEN SATURATION: 96 % | HEART RATE: 96 BPM

## 2019-09-24 LAB
ANION GAP SERPL CALC-SCNC: 10 MMOL/L — SIGNIFICANT CHANGE UP (ref 5–17)
BASOPHILS # BLD AUTO: 0.02 K/UL — SIGNIFICANT CHANGE UP (ref 0–0.2)
BASOPHILS NFR BLD AUTO: 0.2 % — SIGNIFICANT CHANGE UP (ref 0–2)
BUN SERPL-MCNC: 10 MG/DL — SIGNIFICANT CHANGE UP (ref 7–23)
CALCIUM SERPL-MCNC: 8.5 MG/DL — SIGNIFICANT CHANGE UP (ref 8.4–10.5)
CHLORIDE SERPL-SCNC: 106 MMOL/L — SIGNIFICANT CHANGE UP (ref 96–108)
CO2 SERPL-SCNC: 30 MMOL/L — SIGNIFICANT CHANGE UP (ref 22–31)
CREAT SERPL-MCNC: 0.75 MG/DL — SIGNIFICANT CHANGE UP (ref 0.5–1.3)
EOSINOPHIL # BLD AUTO: 0.06 K/UL — SIGNIFICANT CHANGE UP (ref 0–0.5)
EOSINOPHIL NFR BLD AUTO: 0.5 % — SIGNIFICANT CHANGE UP (ref 0–6)
GLUCOSE SERPL-MCNC: 88 MG/DL — SIGNIFICANT CHANGE UP (ref 70–99)
HCT VFR BLD CALC: 33.7 % — LOW (ref 34.5–45)
HGB BLD-MCNC: 11.1 G/DL — LOW (ref 11.5–15.5)
IMM GRANULOCYTES NFR BLD AUTO: 0.4 % — SIGNIFICANT CHANGE UP (ref 0–1.5)
LYMPHOCYTES # BLD AUTO: 2.42 K/UL — SIGNIFICANT CHANGE UP (ref 1–3.3)
LYMPHOCYTES # BLD AUTO: 21.6 % — SIGNIFICANT CHANGE UP (ref 13–44)
MCHC RBC-ENTMCNC: 31.5 PG — SIGNIFICANT CHANGE UP (ref 27–34)
MCHC RBC-ENTMCNC: 32.9 GM/DL — SIGNIFICANT CHANGE UP (ref 32–36)
MCV RBC AUTO: 95.7 FL — SIGNIFICANT CHANGE UP (ref 80–100)
MONOCYTES # BLD AUTO: 0.81 K/UL — SIGNIFICANT CHANGE UP (ref 0–0.9)
MONOCYTES NFR BLD AUTO: 7.2 % — SIGNIFICANT CHANGE UP (ref 2–14)
NEUTROPHILS # BLD AUTO: 7.87 K/UL — HIGH (ref 1.8–7.4)
NEUTROPHILS NFR BLD AUTO: 70.1 % — SIGNIFICANT CHANGE UP (ref 43–77)
NRBC # BLD: 0 /100 WBCS — SIGNIFICANT CHANGE UP (ref 0–0)
PLATELET # BLD AUTO: 196 K/UL — SIGNIFICANT CHANGE UP (ref 150–400)
POTASSIUM SERPL-MCNC: 3.8 MMOL/L — SIGNIFICANT CHANGE UP (ref 3.5–5.3)
POTASSIUM SERPL-SCNC: 3.8 MMOL/L — SIGNIFICANT CHANGE UP (ref 3.5–5.3)
RBC # BLD: 3.52 M/UL — LOW (ref 3.8–5.2)
RBC # FLD: 12.9 % — SIGNIFICANT CHANGE UP (ref 10.3–14.5)
SODIUM SERPL-SCNC: 146 MMOL/L — HIGH (ref 135–145)
WBC # BLD: 11.22 K/UL — HIGH (ref 3.8–10.5)
WBC # FLD AUTO: 11.22 K/UL — HIGH (ref 3.8–10.5)

## 2019-09-24 RX ORDER — DOCUSATE SODIUM 100 MG
1 CAPSULE ORAL
Qty: 21 | Refills: 0
Start: 2019-09-24 | End: 2019-09-30

## 2019-09-24 RX ADMIN — OXYCODONE HYDROCHLORIDE 10 MILLIGRAM(S): 5 TABLET ORAL at 06:03

## 2019-09-24 RX ADMIN — OXYCODONE HYDROCHLORIDE 10 MILLIGRAM(S): 5 TABLET ORAL at 05:18

## 2019-09-24 RX ADMIN — Medication 100 MILLIGRAM(S): at 01:27

## 2019-09-24 RX ADMIN — SODIUM CHLORIDE 100 MILLILITER(S): 9 INJECTION, SOLUTION INTRAVENOUS at 01:28

## 2019-09-24 RX ADMIN — Medication 50 MILLIGRAM(S): at 01:27

## 2019-09-24 RX ADMIN — Medication 100 MILLIGRAM(S): at 05:18

## 2019-09-24 RX ADMIN — Medication 112 MICROGRAM(S): at 05:18

## 2019-09-24 NOTE — PROGRESS NOTE ADULT - SUBJECTIVE AND OBJECTIVE BOX
Ortho AM Note     STATUS POST: s/p L4-5 Lamiectomy on 9/23/19                   SUBJECTIVE: Patient seen and examined. States to mild pain controlled. Patient denies any CP, SOB, fever, chills, numbness/tingling.     Pain:  well controlled      OBJECTIVE:     Vital Signs Last 24 Hrs  T(C): 36.9 (24 Sep 2019 04:50), Max: 37.2 (23 Sep 2019 21:18)  T(F): 98.5 (24 Sep 2019 04:50), Max: 99 (23 Sep 2019 21:18)  HR: 91 (24 Sep 2019 04:50) (64 - 101)  BP: 101/62 (24 Sep 2019 04:50) (94/52 - 125/72)  BP(mean): 77 (23 Sep 2019 13:30) (71 - 86)  RR: 18 (24 Sep 2019 04:50) (5 - 21)  SpO2: 98% (24 Sep 2019 04:50) (94% - 99%)    Affected extremity:          Dressing: clean/dry/intact          HVx 1 intact         Sensation: intact to light touch          Motor exam: Left EHL/TA/GS 5/5, Right EHL/TA 2/5, GS 4+/5         warm, well-perfused; capillary refill < 3 seconds              ASSESSMENT AND PLAN:   s/p L4-5 Lamiectomy      1. Analgesic pain control  2. DVT prophylaxis: SCDs      3. Continue PT    4. Weight Bearing Status:  Weight bearing as tolerated       5. Disposition: Pending PT eval

## 2019-09-24 NOTE — PROGRESS NOTE ADULT - SUBJECTIVE AND OBJECTIVE BOX
Orthopaedic Surgery Progress Note    Patient seen and examined. HELDER. Patient without complaints. Pain controlled. Denies CP, SOB, N/V, tactile fevers, calf pain.      Vital Signs Last 24 Hrs  T(C): 36.9 (24 Sep 2019 08:32), Max: 37.2 (23 Sep 2019 21:18)  T(F): 98.4 (24 Sep 2019 08:32), Max: 99 (23 Sep 2019 21:18)  HR: 96 (24 Sep 2019 08:32) (64 - 101)  BP: 98/61 (24 Sep 2019 08:32) (94/52 - 125/72)  BP(mean): 77 (23 Sep 2019 13:30) (71 - 86)  RR: 17 (24 Sep 2019 08:32) (5 - 21)  SpO2: 96% (24 Sep 2019 08:32) (94% - 99%)      Physical Exam:  Pt laying comfortably in bed, NAD.  Skin warm and well perfused, no visible erythema/ecchymoses.  Dressing C/D/I, HV x 1   EHL 2/5, TA 4/5 right lower extremity; EHL/TA 5/5 left lower extremity  FHL/GS 5/5 bilaterally   SLT in tact and equal to distal bilateral lower extremities   DP pulses 2+   Calves soft and nontender to palpation     LABS                        11.1   11.22 )-----------( 196      ( 24 Sep 2019 05:59 )             33.7                                09-24    146<H>  |  106  |  10  ----------------------------<  88  3.8   |  30  |  0.75    Ca    8.5      24 Sep 2019 05:59        A/P: 54F POD #1 s/p Laminectomy L4-L5    CONTINUE:        1. PT: WBAT   2. DVT prophylaxis: SCDs  3. Pain Control as needed   4. Dispo:  Home today pending  output

## 2019-09-24 NOTE — DISCHARGE NOTE NURSING/CASE MANAGEMENT/SOCIAL WORK - PATIENT PORTAL LINK FT
You can access the FollowMyHealth Patient Portal offered by [facility] by registering at the following website: [email or web address]. By joining [employer]’s FollowMyHealth portal, you will also be able to view your health information using other applications (apps) compatible with our system.

## 2019-09-24 NOTE — DISCHARGE NOTE NURSING/CASE MANAGEMENT/SOCIAL WORK - NSDCPEWEB_GEN_ALL_CORE
[place] website --- [email or web address] Tobacco [email or web address] website --- [email or web address]

## 2019-09-24 NOTE — DISCHARGE NOTE NURSING/CASE MANAGEMENT/SOCIAL WORK - NSDCPEEMAIL_GEN_ALL_CORE
[facility] for Tobacco Control email [email or web address]

## 2019-10-03 PROCEDURE — 86900 BLOOD TYPING SEROLOGIC ABO: CPT

## 2019-10-03 PROCEDURE — 36415 COLL VENOUS BLD VENIPUNCTURE: CPT

## 2019-10-03 PROCEDURE — 80048 BASIC METABOLIC PNL TOTAL CA: CPT

## 2019-10-03 PROCEDURE — 85025 COMPLETE CBC W/AUTO DIFF WBC: CPT

## 2019-10-03 PROCEDURE — 97161 PT EVAL LOW COMPLEX 20 MIN: CPT

## 2019-10-03 PROCEDURE — 95940 IONM IN OPERATNG ROOM 15 MIN: CPT

## 2019-10-03 PROCEDURE — 86850 RBC ANTIBODY SCREEN: CPT

## 2019-10-03 PROCEDURE — 86901 BLOOD TYPING SEROLOGIC RH(D): CPT

## 2019-10-03 PROCEDURE — 76000 FLUOROSCOPY <1 HR PHYS/QHP: CPT

## 2019-10-09 ENCOUNTER — RX RENEWAL (OUTPATIENT)
Age: 54
End: 2019-10-09

## 2019-10-09 DIAGNOSIS — M48.061 SPINAL STENOSIS, LUMBAR REGION WITHOUT NEUROGENIC CLAUDICATION: ICD-10-CM

## 2019-10-09 DIAGNOSIS — Z88.0 ALLERGY STATUS TO PENICILLIN: ICD-10-CM

## 2019-10-09 DIAGNOSIS — E03.9 HYPOTHYROIDISM, UNSPECIFIED: ICD-10-CM

## 2019-10-09 DIAGNOSIS — F17.210 NICOTINE DEPENDENCE, CIGARETTES, UNCOMPLICATED: ICD-10-CM

## 2019-10-09 DIAGNOSIS — M71.38 OTHER BURSAL CYST, OTHER SITE: ICD-10-CM

## 2019-10-09 DIAGNOSIS — M79.7 FIBROMYALGIA: ICD-10-CM

## 2019-10-09 DIAGNOSIS — Z88.1 ALLERGY STATUS TO OTHER ANTIBIOTIC AGENTS STATUS: ICD-10-CM

## 2019-10-09 DIAGNOSIS — K21.9 GASTRO-ESOPHAGEAL REFLUX DISEASE WITHOUT ESOPHAGITIS: ICD-10-CM

## 2019-10-09 DIAGNOSIS — M21.371 FOOT DROP, RIGHT FOOT: ICD-10-CM

## 2019-11-05 ENCOUNTER — APPOINTMENT (OUTPATIENT)
Dept: RHEUMATOLOGY | Facility: CLINIC | Age: 54
End: 2019-11-05
Payer: COMMERCIAL

## 2019-11-05 PROBLEM — E03.9 HYPOTHYROIDISM, UNSPECIFIED: Chronic | Status: ACTIVE | Noted: 2019-09-20

## 2019-11-05 PROBLEM — M79.7 FIBROMYALGIA: Chronic | Status: ACTIVE | Noted: 2019-09-20

## 2019-11-05 PROBLEM — K21.9 GASTRO-ESOPHAGEAL REFLUX DISEASE WITHOUT ESOPHAGITIS: Chronic | Status: ACTIVE | Noted: 2019-09-20

## 2019-11-05 PROBLEM — M48.061 SPINAL STENOSIS, LUMBAR REGION WITHOUT NEUROGENIC CLAUDICATION: Chronic | Status: ACTIVE | Noted: 2019-09-20

## 2019-11-05 PROCEDURE — 96372 THER/PROPH/DIAG INJ SC/IM: CPT

## 2019-11-05 RX ORDER — KETOROLAC TROMETHAMINE 30 MG/ML
30 INJECTION, SOLUTION INTRAMUSCULAR; INTRAVENOUS
Qty: 1 | Refills: 0 | Status: COMPLETED | OUTPATIENT
Start: 2019-11-05

## 2019-11-05 RX ORDER — METHYLPRED ACET/NACL,ISO-OS/PF 40 MG/ML
40 VIAL (ML) INJECTION
Qty: 1 | Refills: 0 | Status: COMPLETED | OUTPATIENT
Start: 2019-11-05

## 2019-11-05 RX ADMIN — METHYLPREDNISOLONE ACETATE 0 MG/ML: 40 INJECTION, SUSPENSION INTRA-ARTICULAR; INTRALESIONAL; INTRAMUSCULAR; SOFT TISSUE at 00:00

## 2019-11-05 RX ADMIN — KETOROLAC TROMETHAMINE 0 MG/ML: 30 INJECTION, SOLUTION INTRAMUSCULAR; INTRAVENOUS at 00:00

## 2019-11-14 ENCOUNTER — APPOINTMENT (OUTPATIENT)
Dept: RHEUMATOLOGY | Facility: CLINIC | Age: 54
End: 2019-11-14
Payer: COMMERCIAL

## 2019-11-14 VITALS
WEIGHT: 130 LBS | HEIGHT: 63 IN | DIASTOLIC BLOOD PRESSURE: 68 MMHG | BODY MASS INDEX: 23.04 KG/M2 | SYSTOLIC BLOOD PRESSURE: 112 MMHG

## 2019-11-14 PROCEDURE — 99213 OFFICE O/P EST LOW 20 MIN: CPT

## 2019-11-17 NOTE — REVIEW OF SYSTEMS
[Feeling Tired] : feeling tired [Joint Pain] : joint pain [Joint Stiffness] : joint stiffness [Negative] : Psychiatric [de-identified] : restless legs

## 2019-11-17 NOTE — HISTORY OF PRESENT ILLNESS
[FreeTextEntry1] : Her surgery went well.\par She received an IM injection of Toradol/Depomedrol before she went on vacation for flare of her fibromyalgia, which resolved the pain.  \par She also notes that increasing the dose of Gabapentin (to 600 mg at night) helped and she is sleeping better.\par She notes that about an hour before she takes the Requip and Gabapentin, her legs start to feel restless.\par She denies headache, blurry vision, scalp and jaw tenderness.

## 2019-12-02 ENCOUNTER — APPOINTMENT (OUTPATIENT)
Dept: PAIN MANAGEMENT | Facility: CLINIC | Age: 54
End: 2019-12-02
Payer: COMMERCIAL

## 2019-12-02 VITALS
WEIGHT: 137 LBS | BODY MASS INDEX: 24.27 KG/M2 | DIASTOLIC BLOOD PRESSURE: 60 MMHG | HEIGHT: 63 IN | SYSTOLIC BLOOD PRESSURE: 102 MMHG

## 2019-12-02 PROCEDURE — 99204 OFFICE O/P NEW MOD 45 MIN: CPT

## 2019-12-03 NOTE — DATA REVIEWED
[FreeTextEntry1] : Exam Date: 	  11/25/19					\par Exam: 	MRI LUMBAR SPINE					\par Order#:	[id number]					\par             \par \par \par INDICATIONS: Back pain since a laminectomy performed on 9/23/2019.\par   \par TECHNIQUE: MRI of the lumbar spine was performed without intravenous contrast.  \par \par COMPARISON EXAMINATION: 8/21/2019  \par \par FINDINGS:\par Lumbosacral transitional vertebra. For the purposes of this dictation, the first non rib-bearing vertebra is labeled as L1. L5 is sacralized on the left.\par \par Postsurgical changes at the level of L4 related to recent L4 laminectomy.\par \par Mild lumbar levoscoliosis with exaggeration of the lumbar lordosis.\par   \par No evidence of acute fracture or compression deformity. Facet joint alignments are maintained.\par \par Grade 1 anterolisthesis of L4 on L5, stable.\par \par No prevertebral soft tissue swelling.\par \par Conus medullaris terminates at L1.\par \par L1-L2: No significant spinal canal stenosis or neural foraminal narrowing.\par \par L2-L3: No significant spinal canal stenosis or neural foraminal narrowing.\par \par L3-L4: No significant spinal canal stenosis or neural foraminal narrowing.\par \par L4-L5: Postsurgical changes related to recent L4 laminectomy. No significant spinal canal stenosis. Right facet hypertrophy and adjacent scar tissue result in severe right neural foraminal narrowing with compression of the exiting right L4 nerve root. No significant left neural foraminal narrowing.\par \par L5-S1: No significant spinal canal stenosis or neural foraminal narrowing.\par \par \par IMPRESSION:  \par Postsurgical changes related to recent L4 laminectomy. Right facet hypertrophy and adjacent scar tissue at the L4-L5 level result in severe right neural foraminal narrowing with compression of the exiting right L4 nerve root.\par

## 2019-12-03 NOTE — HISTORY OF PRESENT ILLNESS
[2] : a minimum pain level of 2/10 [6] : a maximum pain level of 6/10 [Dull] : dull [Medications] : medications [FreeTextEntry1] : 54 yof presents w/ low back and right leg pain. She had back pain and a right foot drop for months and eventually had surgery in September of 2019. Surgery went well and she had no problems following this. Her numbness resolved. Then, her numbness began to return. She also has begun to develop a right foot drop. Pain is dull and throbbing in the right leg. The pain is 6/10 in intensity at its worst. Her exertion makes the pain worse. Pain improves with rest. She has seen her surgeon who advised against further surgery at this time.\par  [de-identified] : numbness [FreeTextEntry7] : slipped disc  [FreeTextEntry3] : over exertion

## 2019-12-03 NOTE — REVIEW OF SYSTEMS
[Feeling Tired] : fatigue [Diarrhea] : diarrhea [Joint Pain] : joint pain [Joint Stiffness] : joint stiffness [Numbness] : numbness [Negative] : Heme/Lymph [de-identified] : weakness

## 2019-12-03 NOTE — PHYSICAL EXAM
[de-identified] : Constitutional: Well-developed, in no acute distress\par Eyes: Pupil- Right: normal, Left: normal\par Neck exam: Inspection normal\par Respiratory: Normal effort, speaking in full sentences\par Cardiovascular: Regular rate and rhythm\par Vascular: Dorsal pedis pulses normal and equal bilaterally\par Abdomen: Inspection normal, no distension\par Skin: Inspection normal, no bruising noted\par Musculoskeletal:\par Lumbar Spine:   Gait: Antalgic\par 		Inspection: Normal curvature, no abnormal kyphosis or scoliosis\par 		Facet loading: pain bilaterally\par 		Palpation:\par 			Lumbar and paraspinal muscles: pain bilaterally\par 			Sacroiliac joint: no pain\par 			Greater trochanter: no pain\par 		Muscle Strength:\par 		Iliopsoas: 5/5 bilaterally\par 		Quadriceps: 5/5 bilaterally\par 		Hamstrings: 5/5 bilaterally\par 		Tibialis anterior: 5/5 bilaterally\par 		Extensor hallucis longus: 5/5 bilaterally\par \par 		Sensation: normal and equal in bilateral lower extremities\par \par 		Reflexes:\par 			Patellar reflex: 2+ bilaterally\par 			Ankle jerk reflex: 2+ bilaterally\par 		\par 		Straight leg raise: negative bilaterally\par \par Extremity: no edema noted\par Neurological: Memory normal, AAO x 3, Cranial nerves II - XII grossly normal\par Psychiatric: Appropriate mood and affect, oriented to time, place, person, and situation\par

## 2019-12-03 NOTE — ASSESSMENT
[FreeTextEntry1] : 54 yof presents s/p right L4 laminectomy w/ low back and right leg pain. She had back pain and a right foot drop for months and eventually had surgery in September of 2019. I have personally reviewed the patient's MRI in detail and discussed it with them which is significant for a recent L4 laminectomy with right facet hypertrophy and adjacent scar tissue at the L4-L5 level resulting in severe right neural foraminal narrowing with compression of the exiting right L4 nerve root. The patient has failed to have relief with medication management and physical therapy. Given the patients failure to improve with all other conservative measures, recommend right L5-S1 transforaminal epidural steroid injection under fluoroscopic guidance. The patient will follow-up with me in my office two weeks following intervention. If no relief she may require further surgical intervention.\par \par

## 2019-12-10 ENCOUNTER — APPOINTMENT (OUTPATIENT)
Dept: PAIN MANAGEMENT | Facility: HOSPITAL | Age: 54
End: 2019-12-10

## 2019-12-10 ENCOUNTER — RESULT REVIEW (OUTPATIENT)
Age: 54
End: 2019-12-10

## 2020-01-10 ENCOUNTER — RX RENEWAL (OUTPATIENT)
Age: 55
End: 2020-01-10

## 2020-02-07 ENCOUNTER — APPOINTMENT (OUTPATIENT)
Dept: RHEUMATOLOGY | Facility: CLINIC | Age: 55
End: 2020-02-07
Payer: COMMERCIAL

## 2020-02-07 VITALS
DIASTOLIC BLOOD PRESSURE: 62 MMHG | BODY MASS INDEX: 25.69 KG/M2 | HEIGHT: 63 IN | SYSTOLIC BLOOD PRESSURE: 102 MMHG | WEIGHT: 145 LBS

## 2020-02-07 PROCEDURE — 99214 OFFICE O/P EST MOD 30 MIN: CPT | Mod: 25

## 2020-02-07 PROCEDURE — 96372 THER/PROPH/DIAG INJ SC/IM: CPT

## 2020-02-07 RX ORDER — GABAPENTIN 400 MG/1
400 CAPSULE ORAL
Qty: 90 | Refills: 0 | Status: DISCONTINUED | COMMUNITY
Start: 2019-02-18 | End: 2020-02-07

## 2020-02-07 RX ADMIN — KETOROLAC TROMETHAMINE 0 MG/ML: 30 INJECTION, SOLUTION INTRAMUSCULAR; INTRAVENOUS at 00:00

## 2020-02-07 RX ADMIN — METHYLPREDNISOLONE ACETATE 0 MG/ML: 40 INJECTION, SUSPENSION INTRA-ARTICULAR; INTRALESIONAL; INTRAMUSCULAR; SOFT TISSUE at 00:00

## 2020-02-08 LAB
25(OH)D3 SERPL-MCNC: 42 NG/ML
ALBUMIN SERPL ELPH-MCNC: 4.6 G/DL
ALP BLD-CCNC: 59 U/L
ALT SERPL-CCNC: 32 U/L
ANION GAP SERPL CALC-SCNC: 15 MMOL/L
AST SERPL-CCNC: 17 U/L
BASOPHILS # BLD AUTO: 0.06 K/UL
BASOPHILS NFR BLD AUTO: 0.7 %
BILIRUB SERPL-MCNC: <0.2 MG/DL
BUN SERPL-MCNC: 16 MG/DL
CALCIUM SERPL-MCNC: 9.7 MG/DL
CHLORIDE SERPL-SCNC: 105 MMOL/L
CK SERPL-CCNC: 61 U/L
CO2 SERPL-SCNC: 24 MMOL/L
CREAT SERPL-MCNC: 0.73 MG/DL
CRP SERPL-MCNC: 0.41 MG/DL
EOSINOPHIL # BLD AUTO: 0.2 K/UL
EOSINOPHIL NFR BLD AUTO: 2.4 %
ERYTHROCYTE [SEDIMENTATION RATE] IN BLOOD BY WESTERGREN METHOD: 14 MM/HR
GLUCOSE SERPL-MCNC: 115 MG/DL
HCT VFR BLD CALC: 44.7 %
HGB BLD-MCNC: 14.2 G/DL
IMM GRANULOCYTES NFR BLD AUTO: 0.4 %
LYMPHOCYTES # BLD AUTO: 2.01 K/UL
LYMPHOCYTES NFR BLD AUTO: 23.9 %
MAN DIFF?: NORMAL
MCHC RBC-ENTMCNC: 30.6 PG
MCHC RBC-ENTMCNC: 31.8 GM/DL
MCV RBC AUTO: 96.3 FL
MONOCYTES # BLD AUTO: 0.51 K/UL
MONOCYTES NFR BLD AUTO: 6.1 %
NEUTROPHILS # BLD AUTO: 5.59 K/UL
NEUTROPHILS NFR BLD AUTO: 66.5 %
PLATELET # BLD AUTO: 264 K/UL
POTASSIUM SERPL-SCNC: 5.5 MMOL/L
PROT SERPL-MCNC: 6.8 G/DL
RBC # BLD: 4.64 M/UL
RBC # FLD: 13.6 %
SODIUM SERPL-SCNC: 144 MMOL/L
VIT B12 SERPL-MCNC: 895 PG/ML
WBC # FLD AUTO: 8.4 K/UL

## 2020-02-08 NOTE — HISTORY OF PRESENT ILLNESS
[FreeTextEntry1] : On Sunday she started not to feel well, with extreme body pain.  She was swabbed for the flu.  No fevers.  No congestion.  Mornings are worse.\par No headache.\par She had a slipped disc and the doc prescribed Diclofenac.\par She put on weight bc she cant go to the gym\par She is on Prednisone 5 mg for awhile.

## 2020-02-08 NOTE — REVIEW OF SYSTEMS
[Feeling Poorly] : feeling poorly [Feeling Tired] : feeling tired [Joint Pain] : joint pain [Joint Stiffness] : joint stiffness [Negative] : Heme/Lymph

## 2020-02-18 RX ORDER — METHYLPRED ACET/NACL,ISO-OS/PF 40 MG/ML
40 VIAL (ML) INJECTION
Qty: 1 | Refills: 0 | Status: COMPLETED | OUTPATIENT
Start: 2020-02-07

## 2020-02-18 RX ORDER — KETOROLAC TROMETHAMINE 30 MG/ML
30 INJECTION, SOLUTION INTRAMUSCULAR; INTRAVENOUS
Qty: 1 | Refills: 0 | Status: COMPLETED | OUTPATIENT
Start: 2020-02-07

## 2020-03-10 ENCOUNTER — RX RENEWAL (OUTPATIENT)
Age: 55
End: 2020-03-10

## 2020-03-15 ENCOUNTER — RX RENEWAL (OUTPATIENT)
Age: 55
End: 2020-03-15

## 2020-04-26 ENCOUNTER — MESSAGE (OUTPATIENT)
Age: 55
End: 2020-04-26

## 2020-05-04 ENCOUNTER — APPOINTMENT (OUTPATIENT)
Dept: DISASTER EMERGENCY | Facility: CLINIC | Age: 55
End: 2020-05-04

## 2020-05-05 LAB
SARS-COV-2 IGG SERPL IA-ACNC: 1.7 AU/ML
SARS-COV-2 IGG SERPL QL IA: NEGATIVE

## 2020-07-31 ENCOUNTER — APPOINTMENT (OUTPATIENT)
Dept: GASTROENTEROLOGY | Facility: CLINIC | Age: 55
End: 2020-07-31
Payer: COMMERCIAL

## 2020-07-31 VITALS
SYSTOLIC BLOOD PRESSURE: 98 MMHG | HEART RATE: 136 BPM | BODY MASS INDEX: 25.69 KG/M2 | WEIGHT: 145 LBS | HEIGHT: 63 IN | DIASTOLIC BLOOD PRESSURE: 56 MMHG

## 2020-07-31 PROCEDURE — 99214 OFFICE O/P EST MOD 30 MIN: CPT

## 2020-08-01 NOTE — PHYSICAL EXAM
[General Appearance - In No Acute Distress] : in no acute distress [General Appearance - Alert] : alert [Outer Ear] : the ears and nose were normal in appearance [Sclera] : the sclera and conjunctiva were normal [Hearing Threshold Finger Rub Not [place]] : hearing was normal [Neck Appearance] : the appearance of the neck was normal [Apical Impulse] : the apical impulse was normal [] : no respiratory distress [Abdomen Tenderness] : non-tender [Abdomen Soft] : soft [No CVA Tenderness] : no ~M costovertebral angle tenderness [Abnormal Walk] : normal gait [No Focal Deficits] : no focal deficits [Skin Color & Pigmentation] : normal skin color and pigmentation [Oriented To Time, Place, And Person] : oriented to person, place, and time [FreeTextEntry1] : deferred to upcoming colonoscopy

## 2020-08-01 NOTE — ASSESSMENT
[FreeTextEntry1] : Celiac - continue strict gluten free diet, will repeat celiac serologies today. Timing of surveillance EGD to be determined following labs. \par \par GERD- controlled on gaviscon and ppi prn-continue. \par \par colon cancer screening-h/o colon polyps in 2017,  colonoscopy due , patient will schedule today. Risks (including but not limited to sedation risks, infection, bleeding, perforation, possibility of missed lesions), benefits and alternatives to procedure, including not doing the procedure, were discussed with patient. Patient understood and agreed to proceed with colonoscopy. \par Colonoscopy preparation instructions reviewed with patient.\par 2 Dulcolax two days prior to procedure + Split MiraLAX/Dulcolax preparation starting day prior to procedure\par \par  \par

## 2020-08-01 NOTE — HISTORY OF PRESENT ILLNESS
[FreeTextEntry1] : 55F with PMR (on Prednisone), hypothyroidism, celiac disease, h/o colon polyps, h/o GERD here for follow up.\par Today, she feels well. \par no nausea/vomiting\par occasional hb/reflux- responds to gaviscon, takes ppi prn\par she had some intentional weight loss. \par she has frequent constipation- dulcolax PRN works well. \par no nsiads\par no brbpr/melena\par she is mostly gluten free, she has trouble being gluten free on the weekends. \par \par \par CT A/P 3/8/19 with oral and IVC -no acute pathology, left punctate intrarenal calculus, mild colonic diverticulosis, possible hepatic steatosis. \par \par Sept 2018 GERD. Celiac serologies were elevated. \par EGD 10/19/18 for GERD and celiac,-small HH, and irregular zline\par duodenal bx- increase IEL\par gastric- chronic HP negative gastritis\par distal esophagus-c/w reflux\par proximal esophagus- no EoE \par \par US 2017 fatty liver\par Colonoscopy 07/2017-  colon polyps, diverticulosis, hemorrhoids. Rec: colonoscopy in 3 years. \par \par  labs 6/12/12-\par  DGP IgG-17, DGP IgA 6.3, TTG 43, anti- endomysial-positive, serum IgA-211\par  DQ2 -homozygous.\par

## 2020-08-02 LAB
ENDOMYSIUM IGA SER QL: NEGATIVE
ENDOMYSIUM IGA TITR SER: NORMAL

## 2020-08-04 NOTE — PATIENT PROFILE ADULT - LONGEST PERIOD TOBACCO-FREE
Erivedge Counseling- I discussed with the patient the risks of Erivedge including but not limited to nausea, vomiting, diarrhea, constipation, weight loss, changes in the sense of taste, decreased appetite, muscle spasms, and hair loss.  The patient verbalized understanding of the proper use and possible adverse effects of Erivedge.  All of the patient's questions and concerns were addressed. 0

## 2020-08-11 LAB
GLIADIN IGA SER QL: 10.7 UNITS
GLIADIN IGG SER QL: <5 UNITS
GLIADIN PEPTIDE IGA SER-ACNC: NEGATIVE
GLIADIN PEPTIDE IGG SER-ACNC: NEGATIVE
TTG IGA SER IA-ACNC: 4.3 U/ML
TTG IGA SER-ACNC: ABNORMAL
TTG IGG SER IA-ACNC: 1.4 U/ML
TTG IGG SER IA-ACNC: NEGATIVE

## 2020-09-11 ENCOUNTER — APPOINTMENT (OUTPATIENT)
Dept: RHEUMATOLOGY | Facility: CLINIC | Age: 55
End: 2020-09-11
Payer: COMMERCIAL

## 2020-09-11 VITALS
HEIGHT: 63 IN | DIASTOLIC BLOOD PRESSURE: 60 MMHG | BODY MASS INDEX: 26.05 KG/M2 | WEIGHT: 147 LBS | SYSTOLIC BLOOD PRESSURE: 100 MMHG

## 2020-09-11 PROCEDURE — 36415 COLL VENOUS BLD VENIPUNCTURE: CPT

## 2020-09-11 PROCEDURE — 99214 OFFICE O/P EST MOD 30 MIN: CPT | Mod: 25

## 2020-09-11 PROCEDURE — 96372 THER/PROPH/DIAG INJ SC/IM: CPT

## 2020-09-11 RX ORDER — KETOROLAC TROMETHAMINE 30 MG/ML
30 INJECTION, SOLUTION INTRAMUSCULAR; INTRAVENOUS
Qty: 1 | Refills: 0 | Status: COMPLETED | OUTPATIENT
Start: 2020-09-11

## 2020-09-11 RX ORDER — METHYLPRED ACET/NACL,ISO-OS/PF 40 MG/ML
40 VIAL (ML) INJECTION
Qty: 1 | Refills: 0 | Status: COMPLETED | OUTPATIENT
Start: 2020-09-11

## 2020-09-11 RX ADMIN — KETOROLAC TROMETHAMINE 0 MG/ML: 30 INJECTION, SOLUTION INTRAMUSCULAR; INTRAVENOUS at 00:00

## 2020-09-11 RX ADMIN — METHYLPREDNISOLONE ACETATE 0 MG/ML: 40 INJECTION, SUSPENSION INTRA-ARTICULAR; INTRALESIONAL; INTRAMUSCULAR; SOFT TISSUE at 00:00

## 2020-09-12 LAB
25(OH)D3 SERPL-MCNC: 31.7 NG/ML
ALBUMIN SERPL ELPH-MCNC: 4.9 G/DL
ALP BLD-CCNC: 76 U/L
ALT SERPL-CCNC: 19 U/L
ANION GAP SERPL CALC-SCNC: 17 MMOL/L
AST SERPL-CCNC: 22 U/L
BASOPHILS # BLD AUTO: 0.06 K/UL
BASOPHILS NFR BLD AUTO: 0.7 %
BILIRUB SERPL-MCNC: 0.4 MG/DL
BUN SERPL-MCNC: 13 MG/DL
CALCIUM SERPL-MCNC: 10.2 MG/DL
CHLORIDE SERPL-SCNC: 97 MMOL/L
CO2 SERPL-SCNC: 25 MMOL/L
CREAT SERPL-MCNC: 0.95 MG/DL
CRP SERPL-MCNC: 1 MG/DL
EOSINOPHIL # BLD AUTO: 0.09 K/UL
EOSINOPHIL NFR BLD AUTO: 1.1 %
ERYTHROCYTE [SEDIMENTATION RATE] IN BLOOD BY WESTERGREN METHOD: 28 MM/HR
GLUCOSE SERPL-MCNC: 97 MG/DL
HCT VFR BLD CALC: 45.2 %
HGB BLD-MCNC: 14 G/DL
IMM GRANULOCYTES NFR BLD AUTO: 0.8 %
LYMPHOCYTES # BLD AUTO: 1.17 K/UL
LYMPHOCYTES NFR BLD AUTO: 13.8 %
MAN DIFF?: NORMAL
MCHC RBC-ENTMCNC: 30.3 PG
MCHC RBC-ENTMCNC: 31 GM/DL
MCV RBC AUTO: 97.8 FL
MONOCYTES # BLD AUTO: 0.35 K/UL
MONOCYTES NFR BLD AUTO: 4.1 %
NEUTROPHILS # BLD AUTO: 6.75 K/UL
NEUTROPHILS NFR BLD AUTO: 79.5 %
PLATELET # BLD AUTO: 291 K/UL
POTASSIUM SERPL-SCNC: 4.9 MMOL/L
PROT SERPL-MCNC: 7.4 G/DL
RBC # BLD: 4.62 M/UL
RBC # FLD: 13.3 %
SARS-COV-2 IGG SERPL IA-ACNC: 0.08 INDEX
SARS-COV-2 IGG SERPL QL IA: NEGATIVE
SODIUM SERPL-SCNC: 140 MMOL/L
VIT B12 SERPL-MCNC: 880 PG/ML
WBC # FLD AUTO: 8.49 K/UL

## 2020-09-13 NOTE — HISTORY OF PRESENT ILLNESS
[FreeTextEntry1] : Over the summer she isn't compliant with her gluten free diet.\par She takes Prednisone 5 mg daily.  If she has a really bad day she takes an extra 5 mg.\par She will have a colonoscopy on 10/5.\par She takes Gabapentin 600 mg at night and Ropinirole 4 mg at night.\par She has a lot of stress right now, which she feels is contributing to her diffuse achiness.\par No rashes.\par No swollen joints.

## 2020-09-13 NOTE — PROCEDURE
[FreeTextEntry1] : Intramuscular injection:\par \par Area cleaned with alcohol. Depomedrol 40 mg and Toradol 30 mg  injected IM R deltoid.Pt tolerated procedure well.\par

## 2020-10-02 ENCOUNTER — RESULT REVIEW (OUTPATIENT)
Age: 55
End: 2020-10-02

## 2020-10-04 ENCOUNTER — RESULT REVIEW (OUTPATIENT)
Age: 55
End: 2020-10-04

## 2020-10-05 ENCOUNTER — APPOINTMENT (OUTPATIENT)
Dept: GASTROENTEROLOGY | Facility: HOSPITAL | Age: 55
End: 2020-10-05

## 2020-10-17 ENCOUNTER — TRANSCRIPTION ENCOUNTER (OUTPATIENT)
Age: 55
End: 2020-10-17

## 2020-10-18 ENCOUNTER — TRANSCRIPTION ENCOUNTER (OUTPATIENT)
Age: 55
End: 2020-10-18

## 2020-10-30 ENCOUNTER — APPOINTMENT (OUTPATIENT)
Dept: RHEUMATOLOGY | Facility: CLINIC | Age: 55
End: 2020-10-30
Payer: COMMERCIAL

## 2020-10-30 PROCEDURE — 96372 THER/PROPH/DIAG INJ SC/IM: CPT

## 2020-10-30 PROCEDURE — 99072 ADDL SUPL MATRL&STAF TM PHE: CPT

## 2020-11-01 RX ORDER — METHYLPRED ACET/NACL,ISO-OS/PF 40 MG/ML
40 VIAL (ML) INJECTION
Qty: 1 | Refills: 0 | Status: COMPLETED | OUTPATIENT
Start: 2020-11-01

## 2020-11-01 RX ORDER — KETOROLAC TROMETHAMINE 30 MG/ML
30 INJECTION, SOLUTION INTRAMUSCULAR; INTRAVENOUS
Qty: 1 | Refills: 0 | Status: COMPLETED | OUTPATIENT
Start: 2020-11-01

## 2020-11-01 RX ADMIN — KETOROLAC TROMETHAMINE 0 MG/ML: 30 INJECTION, SOLUTION INTRAMUSCULAR; INTRAVENOUS at 00:00

## 2020-11-01 RX ADMIN — METHYLPREDNISOLONE ACETATE 0 MG/ML: 40 INJECTION, SUSPENSION INTRA-ARTICULAR; INTRALESIONAL; INTRAMUSCULAR; SOFT TISSUE at 00:00

## 2020-11-01 NOTE — PROCEDURE
[FreeTextEntry1] : Area cleaned with alcohol.  Depomedrol 40 mg and Toradol 30 mg injected IM into right deltoid.  Patient tolerated procedure well without immediate complications.\par

## 2020-11-13 ENCOUNTER — APPOINTMENT (OUTPATIENT)
Dept: RHEUMATOLOGY | Facility: CLINIC | Age: 55
End: 2020-11-13
Payer: COMMERCIAL

## 2020-11-13 ENCOUNTER — APPOINTMENT (OUTPATIENT)
Dept: INTERNAL MEDICINE | Facility: CLINIC | Age: 55
End: 2020-11-13

## 2020-11-13 VITALS
WEIGHT: 160 LBS | DIASTOLIC BLOOD PRESSURE: 80 MMHG | SYSTOLIC BLOOD PRESSURE: 120 MMHG | BODY MASS INDEX: 28.35 KG/M2 | HEIGHT: 63 IN

## 2020-11-13 PROCEDURE — 99214 OFFICE O/P EST MOD 30 MIN: CPT

## 2020-11-28 NOTE — HISTORY OF PRESENT ILLNESS
[FreeTextEntry1] : Injection that she received last week helped a little.  She has gained weight bc she is eating more and not working out.\par \par She is under a lot of stress.  She hurts all over.\par \par She increased dose of Gabapentin to 700 mg at night.  She takes Prednisone 7.5 mg daily.\par \par

## 2020-12-14 ENCOUNTER — NON-APPOINTMENT (OUTPATIENT)
Age: 55
End: 2020-12-14

## 2020-12-28 ENCOUNTER — RX RENEWAL (OUTPATIENT)
Age: 55
End: 2020-12-28

## 2021-01-08 ENCOUNTER — APPOINTMENT (OUTPATIENT)
Dept: RHEUMATOLOGY | Facility: CLINIC | Age: 56
End: 2021-01-08

## 2021-01-22 ENCOUNTER — APPOINTMENT (OUTPATIENT)
Dept: RHEUMATOLOGY | Facility: CLINIC | Age: 56
End: 2021-01-22

## 2021-01-29 ENCOUNTER — APPOINTMENT (OUTPATIENT)
Dept: RHEUMATOLOGY | Facility: CLINIC | Age: 56
End: 2021-01-29
Payer: COMMERCIAL

## 2021-01-29 PROCEDURE — 96372 THER/PROPH/DIAG INJ SC/IM: CPT

## 2021-01-29 PROCEDURE — 99072 ADDL SUPL MATRL&STAF TM PHE: CPT

## 2021-01-29 RX ORDER — METHYLPRED ACET/NACL,ISO-OS/PF 40 MG/ML
40 VIAL (ML) INJECTION
Qty: 1 | Refills: 0 | Status: COMPLETED | OUTPATIENT
Start: 2021-01-29

## 2021-01-29 RX ORDER — KETOROLAC TROMETHAMINE 30 MG/ML
30 INJECTION, SOLUTION INTRAMUSCULAR; INTRAVENOUS
Qty: 1 | Refills: 0 | Status: COMPLETED | OUTPATIENT
Start: 2021-01-29

## 2021-01-29 RX ADMIN — METHYLPREDNISOLONE ACETATE 0 MG/ML: 40 INJECTION, SUSPENSION INTRA-ARTICULAR; INTRALESIONAL; INTRAMUSCULAR; SOFT TISSUE at 00:00

## 2021-01-29 RX ADMIN — KETOROLAC TROMETHAMINE 0 MG/ML: 30 INJECTION, SOLUTION INTRAMUSCULAR; INTRAVENOUS at 00:00

## 2021-02-10 ENCOUNTER — APPOINTMENT (OUTPATIENT)
Dept: RHEUMATOLOGY | Facility: CLINIC | Age: 56
End: 2021-02-10
Payer: COMMERCIAL

## 2021-02-10 VITALS
WEIGHT: 155 LBS | HEIGHT: 63 IN | SYSTOLIC BLOOD PRESSURE: 120 MMHG | DIASTOLIC BLOOD PRESSURE: 80 MMHG | BODY MASS INDEX: 27.46 KG/M2

## 2021-02-10 PROCEDURE — 99214 OFFICE O/P EST MOD 30 MIN: CPT | Mod: 25

## 2021-02-10 PROCEDURE — 36415 COLL VENOUS BLD VENIPUNCTURE: CPT

## 2021-02-10 PROCEDURE — 99072 ADDL SUPL MATRL&STAF TM PHE: CPT

## 2021-02-11 LAB
ALBUMIN SERPL ELPH-MCNC: 4.6 G/DL
ALP BLD-CCNC: 67 U/L
ALT SERPL-CCNC: 22 U/L
ANION GAP SERPL CALC-SCNC: 17 MMOL/L
AST SERPL-CCNC: 18 U/L
BASOPHILS # BLD AUTO: 0.05 K/UL
BASOPHILS NFR BLD AUTO: 0.6 %
BILIRUB SERPL-MCNC: 0.2 MG/DL
BUN SERPL-MCNC: 12 MG/DL
CALCIUM SERPL-MCNC: 9.8 MG/DL
CHLORIDE SERPL-SCNC: 103 MMOL/L
CO2 SERPL-SCNC: 24 MMOL/L
CREAT SERPL-MCNC: 0.95 MG/DL
CRP SERPL-MCNC: 0.24 MG/DL
EOSINOPHIL # BLD AUTO: 0.1 K/UL
EOSINOPHIL NFR BLD AUTO: 1.2 %
ERYTHROCYTE [SEDIMENTATION RATE] IN BLOOD BY WESTERGREN METHOD: 9 MM/HR
GLUCOSE SERPL-MCNC: 88 MG/DL
HCT VFR BLD CALC: 44.5 %
HGB BLD-MCNC: 14.2 G/DL
IMM GRANULOCYTES NFR BLD AUTO: 0.4 %
LYMPHOCYTES # BLD AUTO: 2.19 K/UL
LYMPHOCYTES NFR BLD AUTO: 26.4 %
MAN DIFF?: NORMAL
MCHC RBC-ENTMCNC: 30 PG
MCHC RBC-ENTMCNC: 31.9 GM/DL
MCV RBC AUTO: 93.9 FL
MONOCYTES # BLD AUTO: 0.52 K/UL
MONOCYTES NFR BLD AUTO: 6.3 %
NEUTROPHILS # BLD AUTO: 5.41 K/UL
NEUTROPHILS NFR BLD AUTO: 65.1 %
PLATELET # BLD AUTO: 281 K/UL
POTASSIUM SERPL-SCNC: 4.4 MMOL/L
PROT SERPL-MCNC: 7 G/DL
RBC # BLD: 4.74 M/UL
RBC # FLD: 13.4 %
SODIUM SERPL-SCNC: 143 MMOL/L
VIT B12 SERPL-MCNC: 960 PG/ML
WBC # FLD AUTO: 8.3 K/UL

## 2021-02-15 NOTE — HISTORY OF PRESENT ILLNESS
[FreeTextEntry1] : She got an IM shot of Depomedrol/Toradol recently and feels great.  Doesnt even feel pain from her rotator cuff tears.\par \par She is taking Gabapentin to 700 mg at night and Prednisone 7.5 mg daily.\par

## 2021-02-16 LAB
SARS-COV-2 IGG SERPL IA-ACNC: <0.1 INDEX
SARS-COV-2 IGG SERPL QL IA: NEGATIVE

## 2021-04-20 ENCOUNTER — APPOINTMENT (OUTPATIENT)
Dept: RHEUMATOLOGY | Facility: CLINIC | Age: 56
End: 2021-04-20
Payer: COMMERCIAL

## 2021-04-20 ENCOUNTER — NON-APPOINTMENT (OUTPATIENT)
Age: 56
End: 2021-04-20

## 2021-04-20 VITALS
SYSTOLIC BLOOD PRESSURE: 122 MMHG | BODY MASS INDEX: 27.46 KG/M2 | HEIGHT: 63 IN | WEIGHT: 155 LBS | DIASTOLIC BLOOD PRESSURE: 72 MMHG

## 2021-04-20 PROCEDURE — 99072 ADDL SUPL MATRL&STAF TM PHE: CPT

## 2021-04-20 PROCEDURE — 99214 OFFICE O/P EST MOD 30 MIN: CPT

## 2021-04-29 ENCOUNTER — APPOINTMENT (OUTPATIENT)
Dept: RHEUMATOLOGY | Facility: CLINIC | Age: 56
End: 2021-04-29
Payer: COMMERCIAL

## 2021-04-29 PROCEDURE — 99072 ADDL SUPL MATRL&STAF TM PHE: CPT

## 2021-04-29 PROCEDURE — 96372 THER/PROPH/DIAG INJ SC/IM: CPT

## 2021-04-29 RX ADMIN — METHYLPREDNISOLONE ACETATE 0 MG/ML: 80 INJECTION, SUSPENSION INTRA-ARTICULAR; INTRALESIONAL; INTRAMUSCULAR; SOFT TISSUE at 00:00

## 2021-04-29 RX ADMIN — KETOROLAC TROMETHAMINE 0 MG/2ML: 30 INJECTION, SOLUTION INTRAMUSCULAR at 00:00

## 2021-05-01 NOTE — HISTORY OF PRESENT ILLNESS
[FreeTextEntry1] : She is taking Gabapentin to 700 mg at night and Prednisone 5 mg daily.\par Pain is worse in the morning.  Once she gets moving she feels better.  \par She has terrible low back and leg pain and needs surgery.  Driving for more than 15 minutes makes it worse.

## 2021-06-15 ENCOUNTER — APPOINTMENT (OUTPATIENT)
Dept: RHEUMATOLOGY | Facility: CLINIC | Age: 56
End: 2021-06-15
Payer: COMMERCIAL

## 2021-06-15 VITALS
DIASTOLIC BLOOD PRESSURE: 62 MMHG | SYSTOLIC BLOOD PRESSURE: 118 MMHG | BODY MASS INDEX: 27.46 KG/M2 | HEIGHT: 63 IN | TEMPERATURE: 97.6 F | WEIGHT: 155 LBS

## 2021-06-15 PROCEDURE — 99215 OFFICE O/P EST HI 40 MIN: CPT | Mod: 25

## 2021-06-15 PROCEDURE — 96372 THER/PROPH/DIAG INJ SC/IM: CPT

## 2021-06-15 PROCEDURE — 99072 ADDL SUPL MATRL&STAF TM PHE: CPT

## 2021-06-15 RX ORDER — METHYLPRED ACET/NACL,ISO-OS/PF 40 MG/ML
40 VIAL (ML) INJECTION
Qty: 1 | Refills: 0 | Status: COMPLETED | OUTPATIENT
Start: 2021-06-15

## 2021-06-15 RX ORDER — KETOROLAC TROMETHAMINE 30 MG/ML
30 INJECTION, SOLUTION INTRAMUSCULAR; INTRAVENOUS
Qty: 1 | Refills: 0 | Status: COMPLETED | OUTPATIENT
Start: 2021-06-15

## 2021-06-15 RX ADMIN — Medication 1 MG/ML: at 00:00

## 2021-06-15 RX ADMIN — KETOROLAC TROMETHAMINE 1 MG/ML: 30 INJECTION, SOLUTION INTRAMUSCULAR; INTRAVENOUS at 00:00

## 2021-07-07 NOTE — HISTORY OF PRESENT ILLNESS
[FreeTextEntry1] : She had IM Depomedrol/Toradol on 4/29 and it lasted until 3 weeks ago.\par She is taking Gabapentin to 700 mg at night and Prednisone 5 mg daily.\par She has good days and bad days.  Morning are worse.

## 2021-07-13 LAB
25(OH)D3 SERPL-MCNC: 35.1 NG/ML
ALBUMIN SERPL ELPH-MCNC: 4.7 G/DL
ALP BLD-CCNC: 73 U/L
ALT SERPL-CCNC: 17 U/L
ANION GAP SERPL CALC-SCNC: 14 MMOL/L
AST SERPL-CCNC: 17 U/L
BASOPHILS # BLD AUTO: 0.04 K/UL
BASOPHILS NFR BLD AUTO: 0.6 %
BILIRUB SERPL-MCNC: 0.2 MG/DL
BUN SERPL-MCNC: 15 MG/DL
CALCIUM SERPL-MCNC: 9.7 MG/DL
CHLORIDE SERPL-SCNC: 104 MMOL/L
CO2 SERPL-SCNC: 24 MMOL/L
COVID-19 SPIKE DOMAIN ANTIBODY INTERPRETATION: POSITIVE
CREAT SERPL-MCNC: 0.73 MG/DL
CRP SERPL-MCNC: 8 MG/L
EOSINOPHIL # BLD AUTO: 0.07 K/UL
EOSINOPHIL NFR BLD AUTO: 1 %
ERYTHROCYTE [SEDIMENTATION RATE] IN BLOOD BY WESTERGREN METHOD: 22 MM/HR
GLUCOSE SERPL-MCNC: 110 MG/DL
HCT VFR BLD CALC: 43.5 %
HGB BLD-MCNC: 14 G/DL
IMM GRANULOCYTES NFR BLD AUTO: 0.4 %
LYMPHOCYTES # BLD AUTO: 1.37 K/UL
LYMPHOCYTES NFR BLD AUTO: 18.9 %
MAN DIFF?: NORMAL
MCHC RBC-ENTMCNC: 30.8 PG
MCHC RBC-ENTMCNC: 32.2 GM/DL
MCV RBC AUTO: 95.6 FL
MONOCYTES # BLD AUTO: 0.36 K/UL
MONOCYTES NFR BLD AUTO: 5 %
NEUTROPHILS # BLD AUTO: 5.37 K/UL
NEUTROPHILS NFR BLD AUTO: 74.1 %
PLATELET # BLD AUTO: 264 K/UL
POTASSIUM SERPL-SCNC: 4.6 MMOL/L
PROT SERPL-MCNC: 7 G/DL
RBC # BLD: 4.55 M/UL
RBC # FLD: 13.5 %
SARS-COV-2 AB SERPL IA-ACNC: 119 U/ML
SODIUM SERPL-SCNC: 141 MMOL/L
VIT B12 SERPL-MCNC: 882 PG/ML
WBC # FLD AUTO: 7.24 K/UL

## 2021-08-11 ENCOUNTER — APPOINTMENT (OUTPATIENT)
Dept: RHEUMATOLOGY | Facility: CLINIC | Age: 56
End: 2021-08-11
Payer: COMMERCIAL

## 2021-08-11 VITALS
OXYGEN SATURATION: 95 % | HEIGHT: 63 IN | SYSTOLIC BLOOD PRESSURE: 102 MMHG | DIASTOLIC BLOOD PRESSURE: 60 MMHG | WEIGHT: 155 LBS | HEART RATE: 87 BPM | BODY MASS INDEX: 27.46 KG/M2

## 2021-08-11 PROCEDURE — 99215 OFFICE O/P EST HI 40 MIN: CPT | Mod: 25

## 2021-08-11 PROCEDURE — 96372 THER/PROPH/DIAG INJ SC/IM: CPT

## 2021-08-11 RX ORDER — KETOROLAC TROMETHAMINE 60 MG/2ML
60 INJECTION, SOLUTION INTRAMUSCULAR
Qty: 1 | Refills: 0 | Status: COMPLETED | OUTPATIENT
Start: 2021-04-29

## 2021-08-11 RX ORDER — METHYLPRED ACET/NACL,ISO-OS/PF 80 MG/ML
80 VIAL (ML) INJECTION
Qty: 1 | Refills: 0 | Status: COMPLETED | OUTPATIENT
Start: 2021-04-29

## 2021-08-18 NOTE — HISTORY OF PRESENT ILLNESS
[FreeTextEntry1] : Having spinal fusion/laminectomy/decompression of L4-L5 in October In [facility].\par She notes when driving she has severe right low back pain radiating down her right side with numbness of her right leg.\par She is taking Gabapentin to 700 mg at night and Prednisone 5 mg daily.\par Everything hurts every day.\par intention tremor

## 2021-08-18 NOTE — PROCEDURE
[FreeTextEntry1] : Area cleaned with alcohol.  Depomedrol 80 mg and Toradol 60 mg injected intramuscularly to left gluteus.  patient tolerated the procedure well without immediate complications.\par

## 2021-09-15 ENCOUNTER — RX RENEWAL (OUTPATIENT)
Age: 56
End: 2021-09-15

## 2021-10-08 ENCOUNTER — APPOINTMENT (OUTPATIENT)
Dept: INTERNAL MEDICINE | Facility: CLINIC | Age: 56
End: 2021-10-08
Payer: COMMERCIAL

## 2021-10-08 ENCOUNTER — NON-APPOINTMENT (OUTPATIENT)
Age: 56
End: 2021-10-08

## 2021-10-08 VITALS
WEIGHT: 178 LBS | TEMPERATURE: 97.9 F | BODY MASS INDEX: 31.54 KG/M2 | DIASTOLIC BLOOD PRESSURE: 74 MMHG | SYSTOLIC BLOOD PRESSURE: 126 MMHG | HEIGHT: 63 IN

## 2021-10-08 DIAGNOSIS — Z01.818 ENCOUNTER FOR OTHER PREPROCEDURAL EXAMINATION: ICD-10-CM

## 2021-10-08 DIAGNOSIS — Z87.19 PERSONAL HISTORY OF OTHER DISEASES OF THE DIGESTIVE SYSTEM: ICD-10-CM

## 2021-10-08 DIAGNOSIS — Z20.822 CONTACT WITH AND (SUSPECTED) EXPOSURE TO COVID-19: ICD-10-CM

## 2021-10-08 DIAGNOSIS — Z12.11 ENCOUNTER FOR SCREENING FOR MALIGNANT NEOPLASM OF COLON: ICD-10-CM

## 2021-10-08 DIAGNOSIS — E53.8 DEFICIENCY OF OTHER SPECIFIED B GROUP VITAMINS: ICD-10-CM

## 2021-10-08 DIAGNOSIS — Z87.39 PERSONAL HISTORY OF OTHER DISEASES OF THE MUSCULOSKELETAL SYSTEM AND CONNECTIVE TISSUE: ICD-10-CM

## 2021-10-08 DIAGNOSIS — M51.9 UNSPECIFIED THORACIC, THORACOLUMBAR AND LUMBOSACRAL INTERVERTEBRAL DISC DISORDER: ICD-10-CM

## 2021-10-08 DIAGNOSIS — Z20.828 CONTACT WITH AND (SUSPECTED) EXPOSURE TO OTHER VIRAL COMMUNICABLE DISEASES: ICD-10-CM

## 2021-10-08 DIAGNOSIS — Z86.010 PERSONAL HISTORY OF COLONIC POLYPS: ICD-10-CM

## 2021-10-08 DIAGNOSIS — Z78.0 ASYMPTOMATIC MENOPAUSAL STATE: ICD-10-CM

## 2021-10-08 PROCEDURE — 99214 OFFICE O/P EST MOD 30 MIN: CPT | Mod: 25

## 2021-10-08 PROCEDURE — 93000 ELECTROCARDIOGRAM COMPLETE: CPT

## 2021-10-08 RX ORDER — IBUPROFEN 200 MG
600 CAPSULE ORAL DAILY
Refills: 0 | Status: DISCONTINUED | COMMUNITY
Start: 2019-11-14 | End: 2021-10-08

## 2021-10-08 RX ORDER — CHLORHEXIDINE GLUCONATE 4 %
LIQUID (ML) TOPICAL
Refills: 0 | Status: DISCONTINUED | COMMUNITY
End: 2021-10-08

## 2021-10-08 RX ORDER — CHLORHEXIDINE GLUCONATE 4 %
1000 LIQUID (ML) TOPICAL DAILY
Refills: 0 | Status: DISCONTINUED | COMMUNITY
Start: 2020-11-13 | End: 2021-10-08

## 2021-10-10 LAB
APPEARANCE: CLEAR
BILIRUBIN URINE: NEGATIVE
BLOOD URINE: NEGATIVE
COLOR: NORMAL
GLUCOSE QUALITATIVE U: NEGATIVE
KETONES URINE: NEGATIVE
LEUKOCYTE ESTERASE URINE: NEGATIVE
NITRITE URINE: NEGATIVE
PH URINE: 6.5
PROTEIN URINE: NEGATIVE
SPECIFIC GRAVITY URINE: 1.01
UROBILINOGEN URINE: NORMAL

## 2021-10-10 NOTE — PHYSICAL EXAM
[No JVD] : no jugular venous distention [Coordination Grossly Intact] : coordination grossly intact [No Focal Deficits] : no focal deficits [Normal Gait] : normal gait [Normal Affect] : the affect was normal [Normal Insight/Judgement] : insight and judgment were intact [Normal] : normal rate, regular rhythm, normal S1 and S2 and no murmur heard [Alert and Oriented x3] : oriented to person, place, and time [de-identified] : trace edema

## 2021-10-10 NOTE — HISTORY OF PRESENT ILLNESS
[(Patient denies any chest pain, claudication, dyspnea on exertion, orthopnea, palpitations or syncope)] : Patient denies any chest pain, claudication, dyspnea on exertion, orthopnea, palpitations or syncope [Smoker] : smoker [No Adverse Anesthesia Reaction] : no adverse anesthesia reaction in self or family member [Moderate (4-6 METs)] : Moderate (4-6 METs) [Aortic Stenosis] : no aortic stenosis [Atrial Fibrillation] : no atrial fibrillation [Coronary Artery Disease] : no coronary artery disease [Recent Myocardial Infarction] : no recent myocardial infarction [Implantable Device/Pacemaker] : no implantable device/pacemaker [Asthma] : no asthma [COPD] : no COPD [Sleep Apnea] : no sleep apnea [Chronic Anticoagulation] : no chronic anticoagulation [Chronic Kidney Disease] : no chronic kidney disease [Diabetes] : no diabetes [FreeTextEntry1] : Lumbar Interbody Fusion/ Laminectomy, Decompression L4-L5 [FreeTextEntry2] : 10/18/21 [FreeTextEntry3] : Dr Vadim Oconnor [FreeTextEntry4] : Pt here for pre op for lumbar fusion surgery. She has no chest pains or SOB. She has no known CAD or its equivalents. She does not exercise due to the back pain, but she is able to climb a flight of stairs without cardiac complaints. She is treated for hypothyroidism and fibromyalgia as well as PMR. She is currently on prednisone 5mg daily. She is also a 37 year smoker, currently smoking 5 cigarettes per day.  [FreeTextEntry7] : EKG today NSR at 89 bpm, nl axis, no acute findings for ischemia

## 2021-10-10 NOTE — ASSESSMENT
[High Risk Surgery - Intraperitoneal, Intrathoracic or Supringuinal Vascular Procedures] : High Risk Surgery - Intraperitoneal, Intrathoracic or Supringuinal Vascular Procedures - No (0) [Ischemic Heart Disease] : Ischemic Heart Disease - No (0) [Congestive Heart Failure] : Congestive Heart Failure - No (0) [Prior Cerebrovascular Accident or TIA] : Prior Cerebrovascular Accident or TIA - No (0) [Creatinine >= 2mg/dL (1 Point)] : Creatinine >= 2mg/dL - No (0) [Insulin-dependent Diabetic (1 Point)] : Insulin-dependent Diabetic - No (0) [0] : 0 , RCRI Class: I, Risk of Post-Op Cardiac Complications: 3.9%, 95% CI for Risk Estimate: 2.8% - 5.4% [Patient Optimized for Surgery] : Patient optimized for surgery [No Further Testing Recommended] : no further testing recommended [FreeTextEntry4] : CBC, CMP, UA and Coags reviewed.  CXR pending. Provided that CXR is wnl, there are no medical contraindications to planned procedure. Pt may proceed at acceptable risk.

## 2021-10-15 VITALS
DIASTOLIC BLOOD PRESSURE: 71 MMHG | SYSTOLIC BLOOD PRESSURE: 104 MMHG | HEART RATE: 90 BPM | TEMPERATURE: 98 F | RESPIRATION RATE: 16 BRPM | WEIGHT: 180.12 LBS | HEIGHT: 63 IN

## 2021-10-15 RX ORDER — POVIDONE-IODINE 5 %
1 AEROSOL (ML) TOPICAL ONCE
Refills: 0 | Status: COMPLETED | OUTPATIENT
Start: 2021-10-18 | End: 2021-10-18

## 2021-10-15 RX ORDER — PHENTERMINE HCL 30 MG
1 CAPSULE ORAL
Qty: 0 | Refills: 0 | DISCHARGE

## 2021-10-15 NOTE — H&P ADULT - PROBLEM SELECTOR PLAN 1
Admit to Orthopaedic Service.  Presents today for elective L4-L5 TLIF  Pt medically stable and cleared for procedure today by  Admit to Orthopaedic Service.  Presents today for elective L4-L5 TLIF  Pt medically stable and cleared for procedure today by Dr. Moctezuma

## 2021-10-15 NOTE — H&P ADULT - NSHPPHYSICALEXAM_GEN_ALL_CORE
GENERAL:  PE:  Decreased ROM secondary to pain. Rest of PE per medical clearance. MSK: Skin warm and well perfused. DP pulses palpable bilateral lower extremities. Sensation intact and equal bilateral lower extremities. EHL/TA/GS/FHL firing bilateral lower extremities.   Decreased ROM lumbar spine secondary to pain. Rest of PE per medical clearance.

## 2021-10-15 NOTE — H&P ADULT - NSICDXPASTMEDICALHX_GEN_ALL_CORE_FT
PAST MEDICAL HISTORY:  Celiac disease     Fibromyalgia     GERD (gastroesophageal reflux disease)     Hypothyroid     Spinal stenosis of lumbar region

## 2021-10-15 NOTE — H&P ADULT - NSHPLABSRESULTS_GEN_ALL_CORE
covid pending   missing labs covid pending   preop cbc/bmp/coags/ua wnl per medical clearance   Cr 0.9  Preop EKG wnl per clearance covid pcr negative   preop cbc/bmp/coags/ua wnl per medical clearance   Cr 0.9  Preop EKG wnl per clearance

## 2021-10-15 NOTE — H&P ADULT - NSICDXPASTSURGICALHX_GEN_ALL_CORE_FT
PAST SURGICAL HISTORY:  Complete rotator cuff tear left 2017    Elective surgery ankle surgery x 3    H/O laminectomy

## 2021-10-15 NOTE — H&P ADULT - HISTORY OF PRESENT ILLNESS
55yo f c/o low back pain x   Presents today for elective TLIF L4-L5.  55 y/o F c/o low back pain x 4 years without preceding accident, injury or trauma. The patient ambulates without assistive walking devices. Complains of low back pain which radiates down her right lower extremity and is associated with numbness/tingling down her right lower extremity. Has a history of fibromyalgia for which she takes gabapentin. Denies history of DVT. Of note the patient had a laminectomy 2 years ago, she states she had a right foot drop after that surgery which has resolved.   Presents today for elective TLIF L4-L5.

## 2021-10-17 ENCOUNTER — TRANSCRIPTION ENCOUNTER (OUTPATIENT)
Age: 56
End: 2021-10-17

## 2021-10-18 ENCOUNTER — INPATIENT (INPATIENT)
Facility: HOSPITAL | Age: 56
LOS: 1 days | Discharge: HOME | DRG: 455 | End: 2021-10-20
Payer: COMMERCIAL

## 2021-10-18 DIAGNOSIS — K21.9 GASTRO-ESOPHAGEAL REFLUX DISEASE WITHOUT ESOPHAGITIS: ICD-10-CM

## 2021-10-18 DIAGNOSIS — M75.120 COMPLETE ROTATOR CUFF TEAR OR RUPTURE OF UNSPECIFIED SHOULDER, NOT SPECIFIED AS TRAUMATIC: Chronic | ICD-10-CM

## 2021-10-18 DIAGNOSIS — M48.061 SPINAL STENOSIS, LUMBAR REGION WITHOUT NEUROGENIC CLAUDICATION: ICD-10-CM

## 2021-10-18 DIAGNOSIS — Z41.9 ENCOUNTER FOR PROCEDURE FOR PURPOSES OTHER THAN REMEDYING HEALTH STATE, UNSPECIFIED: Chronic | ICD-10-CM

## 2021-10-18 DIAGNOSIS — M79.7 FIBROMYALGIA: ICD-10-CM

## 2021-10-18 DIAGNOSIS — Z98.890 OTHER SPECIFIED POSTPROCEDURAL STATES: Chronic | ICD-10-CM

## 2021-10-18 DIAGNOSIS — E03.9 HYPOTHYROIDISM, UNSPECIFIED: ICD-10-CM

## 2021-10-18 LAB
BLD GP AB SCN SERPL QL: NEGATIVE — SIGNIFICANT CHANGE UP
RH IG SCN BLD-IMP: NEGATIVE — SIGNIFICANT CHANGE UP

## 2021-10-18 RX ORDER — ROPINIROLE 8 MG/1
4 TABLET, FILM COATED, EXTENDED RELEASE ORAL AT BEDTIME
Refills: 0 | Status: DISCONTINUED | OUTPATIENT
Start: 2021-10-18 | End: 2021-10-20

## 2021-10-18 RX ORDER — GABAPENTIN 400 MG/1
300 CAPSULE ORAL ONCE
Refills: 0 | Status: COMPLETED | OUTPATIENT
Start: 2021-10-18 | End: 2021-10-18

## 2021-10-18 RX ORDER — OXYCODONE HYDROCHLORIDE 5 MG/1
10 TABLET ORAL EVERY 4 HOURS
Refills: 0 | Status: DISCONTINUED | OUTPATIENT
Start: 2021-10-18 | End: 2021-10-20

## 2021-10-18 RX ORDER — ONDANSETRON 8 MG/1
4 TABLET, FILM COATED ORAL EVERY 6 HOURS
Refills: 0 | Status: DISCONTINUED | OUTPATIENT
Start: 2021-10-18 | End: 2021-10-20

## 2021-10-18 RX ORDER — CHLORHEXIDINE GLUCONATE 213 G/1000ML
1 SOLUTION TOPICAL ONCE
Refills: 0 | Status: COMPLETED | OUTPATIENT
Start: 2021-10-18 | End: 2021-10-18

## 2021-10-18 RX ORDER — FOLIC ACID 0.8 MG
1 TABLET ORAL DAILY
Refills: 0 | Status: DISCONTINUED | OUTPATIENT
Start: 2021-10-18 | End: 2021-10-20

## 2021-10-18 RX ORDER — ACETAMINOPHEN 500 MG
1000 TABLET ORAL ONCE
Refills: 0 | Status: COMPLETED | OUTPATIENT
Start: 2021-10-18 | End: 2021-10-18

## 2021-10-18 RX ORDER — POLYETHYLENE GLYCOL 3350 17 G/17G
17 POWDER, FOR SOLUTION ORAL AT BEDTIME
Refills: 0 | Status: DISCONTINUED | OUTPATIENT
Start: 2021-10-18 | End: 2021-10-20

## 2021-10-18 RX ORDER — CEFAZOLIN SODIUM 1 G
2000 VIAL (EA) INJECTION EVERY 8 HOURS
Refills: 0 | Status: COMPLETED | OUTPATIENT
Start: 2021-10-18 | End: 2021-10-19

## 2021-10-18 RX ORDER — SODIUM CHLORIDE 9 MG/ML
1000 INJECTION, SOLUTION INTRAVENOUS
Refills: 0 | Status: DISCONTINUED | OUTPATIENT
Start: 2021-10-19 | End: 2021-10-20

## 2021-10-18 RX ORDER — ACETAMINOPHEN 500 MG
975 TABLET ORAL EVERY 8 HOURS
Refills: 0 | Status: DISCONTINUED | OUTPATIENT
Start: 2021-10-18 | End: 2021-10-20

## 2021-10-18 RX ORDER — FAMOTIDINE 10 MG/ML
20 INJECTION INTRAVENOUS EVERY 12 HOURS
Refills: 0 | Status: DISCONTINUED | OUTPATIENT
Start: 2021-10-18 | End: 2021-10-20

## 2021-10-18 RX ORDER — OXYCODONE HYDROCHLORIDE 5 MG/1
5 TABLET ORAL EVERY 4 HOURS
Refills: 0 | Status: DISCONTINUED | OUTPATIENT
Start: 2021-10-18 | End: 2021-10-20

## 2021-10-18 RX ORDER — HYDROMORPHONE HYDROCHLORIDE 2 MG/ML
0.5 INJECTION INTRAMUSCULAR; INTRAVENOUS; SUBCUTANEOUS
Refills: 0 | Status: DISCONTINUED | OUTPATIENT
Start: 2021-10-18 | End: 2021-10-20

## 2021-10-18 RX ORDER — MAGNESIUM HYDROXIDE 400 MG/1
30 TABLET, CHEWABLE ORAL DAILY
Refills: 0 | Status: DISCONTINUED | OUTPATIENT
Start: 2021-10-18 | End: 2021-10-20

## 2021-10-18 RX ORDER — SENNA PLUS 8.6 MG/1
2 TABLET ORAL AT BEDTIME
Refills: 0 | Status: DISCONTINUED | OUTPATIENT
Start: 2021-10-18 | End: 2021-10-20

## 2021-10-18 RX ORDER — LEVOTHYROXINE SODIUM 125 MCG
112 TABLET ORAL DAILY
Refills: 0 | Status: DISCONTINUED | OUTPATIENT
Start: 2021-10-18 | End: 2021-10-20

## 2021-10-18 RX ORDER — GABAPENTIN 400 MG/1
700 CAPSULE ORAL AT BEDTIME
Refills: 0 | Status: DISCONTINUED | OUTPATIENT
Start: 2021-10-18 | End: 2021-10-20

## 2021-10-18 RX ORDER — APREPITANT 80 MG/1
40 CAPSULE ORAL ONCE
Refills: 0 | Status: COMPLETED | OUTPATIENT
Start: 2021-10-18 | End: 2021-10-18

## 2021-10-18 RX ADMIN — APREPITANT 40 MILLIGRAM(S): 80 CAPSULE ORAL at 10:04

## 2021-10-18 RX ADMIN — CHLORHEXIDINE GLUCONATE 1 APPLICATION(S): 213 SOLUTION TOPICAL at 09:55

## 2021-10-18 RX ADMIN — ROPINIROLE 4 MILLIGRAM(S): 8 TABLET, FILM COATED, EXTENDED RELEASE ORAL at 23:37

## 2021-10-18 RX ADMIN — FAMOTIDINE 20 MILLIGRAM(S): 10 INJECTION INTRAVENOUS at 19:22

## 2021-10-18 RX ADMIN — GABAPENTIN 700 MILLIGRAM(S): 400 CAPSULE ORAL at 23:04

## 2021-10-18 RX ADMIN — Medication 975 MILLIGRAM(S): at 23:59

## 2021-10-18 RX ADMIN — Medication 1 APPLICATION(S): at 10:07

## 2021-10-18 RX ADMIN — Medication 1000 MILLIGRAM(S): at 09:56

## 2021-10-18 RX ADMIN — OXYCODONE HYDROCHLORIDE 10 MILLIGRAM(S): 5 TABLET ORAL at 23:04

## 2021-10-18 RX ADMIN — Medication 100 MILLIGRAM(S): at 19:44

## 2021-10-18 RX ADMIN — GABAPENTIN 300 MILLIGRAM(S): 400 CAPSULE ORAL at 09:57

## 2021-10-18 RX ADMIN — OXYCODONE HYDROCHLORIDE 10 MILLIGRAM(S): 5 TABLET ORAL at 23:59

## 2021-10-18 RX ADMIN — Medication 975 MILLIGRAM(S): at 23:05

## 2021-10-18 RX ADMIN — Medication 1000 MILLIGRAM(S): at 10:20

## 2021-10-18 NOTE — PROGRESS NOTE ADULT - SUBJECTIVE AND OBJECTIVE BOX
Ortho Post Op Check    Procedure: L4-L5 TLIF   Surgeon: Dr. Oconnor     Pt comfortable without complaints, pain controlled. Denies CP, SOB, N/V, numbness/tingling     T(C): 36.2 (10-18-21 @ 15:20), Max: 36.2 (10-18-21 @ 15:20)  HR: 72 (10-18-21 @ 16:20) (70 - 92)  BP: 103/59 (10-18-21 @ 16:20) (95/60 - 115/68)  RR: 9 (10-18-21 @ 16:20) (7 - 9)  SpO2: 100% (10-18-21 @ 16:20) (100% - 100%)  AVSS    General: Pt Alert and oriented, NAD  DSG C/D/I, HV x 1 in place   Pulses: 2+ DP BLE   Sensation: SILT BLE   Motor: EHL/FHL/TA/GS 5/5 BLE     A/P: 56yFemale POD#0 s/p L4-L5 TLIF   - Stable  - Pain Control  - DVT ppx: SCDs   - Post op abx: Ancef   - PT, WBS: WBAT    Ortho Pager [phone]

## 2021-10-19 LAB
ANION GAP SERPL CALC-SCNC: 11 MMOL/L — SIGNIFICANT CHANGE UP (ref 5–17)
BUN SERPL-MCNC: 17 MG/DL — SIGNIFICANT CHANGE UP (ref 7–23)
CALCIUM SERPL-MCNC: 8.9 MG/DL — SIGNIFICANT CHANGE UP (ref 8.4–10.5)
CHLORIDE SERPL-SCNC: 99 MMOL/L — SIGNIFICANT CHANGE UP (ref 96–108)
CO2 SERPL-SCNC: 23 MMOL/L — SIGNIFICANT CHANGE UP (ref 22–31)
COVID-19 SPIKE DOMAIN AB INTERP: POSITIVE
COVID-19 SPIKE DOMAIN ANTIBODY RESULT: >250 U/ML — HIGH
CREAT SERPL-MCNC: 0.79 MG/DL — SIGNIFICANT CHANGE UP (ref 0.5–1.3)
GLUCOSE SERPL-MCNC: 145 MG/DL — HIGH (ref 70–99)
HCT VFR BLD CALC: 36.2 % — SIGNIFICANT CHANGE UP (ref 34.5–45)
HCV AB S/CO SERPL IA: 0.04 S/CO — SIGNIFICANT CHANGE UP
HCV AB SERPL-IMP: SIGNIFICANT CHANGE UP
HGB BLD-MCNC: 12 G/DL — SIGNIFICANT CHANGE UP (ref 11.5–15.5)
MCHC RBC-ENTMCNC: 30.8 PG — SIGNIFICANT CHANGE UP (ref 27–34)
MCHC RBC-ENTMCNC: 33.1 GM/DL — SIGNIFICANT CHANGE UP (ref 32–36)
MCV RBC AUTO: 92.8 FL — SIGNIFICANT CHANGE UP (ref 80–100)
NRBC # BLD: 0 /100 WBCS — SIGNIFICANT CHANGE UP (ref 0–0)
PLATELET # BLD AUTO: 259 K/UL — SIGNIFICANT CHANGE UP (ref 150–400)
POTASSIUM SERPL-MCNC: 5.1 MMOL/L — SIGNIFICANT CHANGE UP (ref 3.5–5.3)
POTASSIUM SERPL-SCNC: 5.1 MMOL/L — SIGNIFICANT CHANGE UP (ref 3.5–5.3)
RBC # BLD: 3.9 M/UL — SIGNIFICANT CHANGE UP (ref 3.8–5.2)
RBC # FLD: 12.7 % — SIGNIFICANT CHANGE UP (ref 10.3–14.5)
SARS-COV-2 IGG+IGM SERPL QL IA: >250 U/ML — HIGH
SARS-COV-2 IGG+IGM SERPL QL IA: POSITIVE
SODIUM SERPL-SCNC: 133 MMOL/L — LOW (ref 135–145)
WBC # BLD: 14.19 K/UL — HIGH (ref 3.8–10.5)
WBC # FLD AUTO: 14.19 K/UL — HIGH (ref 3.8–10.5)

## 2021-10-19 RX ADMIN — Medication 975 MILLIGRAM(S): at 07:00

## 2021-10-19 RX ADMIN — Medication 975 MILLIGRAM(S): at 14:20

## 2021-10-19 RX ADMIN — Medication 1 MILLIGRAM(S): at 13:20

## 2021-10-19 RX ADMIN — SENNA PLUS 2 TABLET(S): 8.6 TABLET ORAL at 22:20

## 2021-10-19 RX ADMIN — Medication 5 MILLIGRAM(S): at 06:28

## 2021-10-19 RX ADMIN — Medication 975 MILLIGRAM(S): at 22:20

## 2021-10-19 RX ADMIN — GABAPENTIN 700 MILLIGRAM(S): 400 CAPSULE ORAL at 22:20

## 2021-10-19 RX ADMIN — Medication 975 MILLIGRAM(S): at 06:28

## 2021-10-19 RX ADMIN — FAMOTIDINE 20 MILLIGRAM(S): 10 INJECTION INTRAVENOUS at 17:56

## 2021-10-19 RX ADMIN — FAMOTIDINE 20 MILLIGRAM(S): 10 INJECTION INTRAVENOUS at 06:28

## 2021-10-19 RX ADMIN — Medication 112 MICROGRAM(S): at 06:28

## 2021-10-19 RX ADMIN — OXYCODONE HYDROCHLORIDE 10 MILLIGRAM(S): 5 TABLET ORAL at 04:00

## 2021-10-19 RX ADMIN — Medication 975 MILLIGRAM(S): at 23:20

## 2021-10-19 RX ADMIN — POLYETHYLENE GLYCOL 3350 17 GRAM(S): 17 POWDER, FOR SOLUTION ORAL at 22:20

## 2021-10-19 RX ADMIN — Medication 100 MILLIGRAM(S): at 03:19

## 2021-10-19 RX ADMIN — Medication 975 MILLIGRAM(S): at 13:20

## 2021-10-19 RX ADMIN — Medication 1 TABLET(S): at 13:20

## 2021-10-19 RX ADMIN — ROPINIROLE 4 MILLIGRAM(S): 8 TABLET, FILM COATED, EXTENDED RELEASE ORAL at 22:20

## 2021-10-19 RX ADMIN — OXYCODONE HYDROCHLORIDE 10 MILLIGRAM(S): 5 TABLET ORAL at 03:25

## 2021-10-19 NOTE — PHYSICAL THERAPY INITIAL EVALUATION ADULT - ADDITIONAL COMMENTS
Pt lives alone but will be staying at her parents house upon D/C.  1 AVI + 1 FOS inside.  Pt denies recent Assistive Device use or history of falls.

## 2021-10-19 NOTE — PROGRESS NOTE ADULT - SUBJECTIVE AND OBJECTIVE BOX
Ortho Note    Subjective:  Pt comfortable without complaints, pain controlled with current pain medication regimen.   Denies CP, SOB, N/V, numbness/tingling     Vital Signs Last 24 Hrs  T(C): 36.7 (10-19-21 @ 08:15), Max: 36.7 (10-19-21 @ 08:15)  T(F): 98 (10-19-21 @ 08:15), Max: 98 (10-19-21 @ 08:15)  HR: 84 (10-19-21 @ 08:15) (84 - 84)  BP: 96/69 (10-19-21 @ 08:15) (96/69 - 96/69)  BP(mean): --  RR: 17 (10-19-21 @ 08:15) (17 - 17)  SpO2: 94% (10-19-21 @ 08:15) (94% - 94%)  AVSS    Objective:    Physical Exam:  General: Pt Alert and oriented, NAD  Lumbar Dressing C/D/I  Pulses: +2 pedal pulses, wwp toes, cap refill less than 3 seconds  Sensation: silt intact  Motor: EHL/FHL/TA/GS- firing        Plan of Care:  A/P: 56yFemale POD# s/p L4-L5 TLIF   - afebrile, nontoxic apperance  - Pain Control- tylenol 975mg PO Q8h, gabapentin 700mg at bedtime, Oxycodone 5-10mg PO Q4h prn moderate to severe pain   - DVT ppx: scds  - PT, WBS: WBAT  - bowel regimen, IS use, PPI  - monitor Hv output  - Dispo- pending pt eval and drain d/c    Ortho Pager [phone] Ortho Note    Subjective:  Pt comfortable without complaints, pain controlled with current pain medication regimen.   Denies CP, SOB, N/V, numbness/tingling     Vital Signs Last 24 Hrs  T(C): 36.7 (10-19-21 @ 08:15), Max: 36.7 (10-19-21 @ 08:15)  T(F): 98 (10-19-21 @ 08:15), Max: 98 (10-19-21 @ 08:15)  HR: 84 (10-19-21 @ 08:15) (84 - 84)  BP: 96/69 (10-19-21 @ 08:15) (96/69 - 96/69)  BP(mean): --  RR: 17 (10-19-21 @ 08:15) (17 - 17)  SpO2: 94% (10-19-21 @ 08:15) (94% - 94%)  AVSS    Objective:    Physical Exam:  General: Pt Alert and oriented, NAD  Lumbar Dressing C/D/I  Pulses: +2 pedal pulses, wwp toes, cap refill less than 3 seconds  Sensation: silt intact  Motor: EHL/FHL/TA/GS- firing        Plan of Care:  A/P: 56yFemale POD# s/p L4-L5 TLIF   - afebrile, nontoxic appearance  - Pain Control- tylenol 975mg PO Q8h, gabapentin 700mg at bedtime, Oxycodone 5-10mg PO Q4h prn moderate to severe pain   - DVT ppx: scds  - PT, WBS: WBAT  - bowel regimen, IS use, PPI  - monitor Hv output  - - continue to trend  - Dispo- pending pt eval and drain d/c    Ortho Pager [phone]

## 2021-10-19 NOTE — PHYSICAL THERAPY INITIAL EVALUATION ADULT - GENERAL OBSERVATIONS, REHAB EVAL
Patient received semi-gandhi in bed in NAD on RA, +SCDs, +Heplock, +hemovac. Cleared by RN. Agreeable to PT.

## 2021-10-19 NOTE — PROGRESS NOTE ADULT - SUBJECTIVE AND OBJECTIVE BOX
Ortho Floor Note    Subjective:  Pt comfortable without complaints, pain controlled. Denies CP, SOB, N/V, numbness/tingling     Vital Signs Last 24 Hrs  T(C): 37.1 (19 Oct 2021 04:21), Max: 37.1 (19 Oct 2021 04:21)  T(F): 98.8 (19 Oct 2021 04:21), Max: 98.8 (19 Oct 2021 04:21)  HR: 85 (19 Oct 2021 04:21) (70 - 92)  BP: 105/66 (19 Oct 2021 04:21) (90/60 - 115/68)  BP(mean): 68 (18 Oct 2021 18:20) (68 - 87)  RR: 16 (19 Oct 2021 04:21) (7 - 25)  SpO2: 95% (19 Oct 2021 04:21) (95% - 100%)    General: Pt Alert and oriented, NAD  DSG C/D/I, HV x 1 in place   Pulses: 2+ DP BLE   Sensation: SILT L2-S1 EHL/FHL/TA/GS 5/5 b/l LE     A/P: 56yFemale s/p L4-L5 TLIF on 10/18.  - Stable  - Pain Control  - DVT ppx: SCDs   - PT, WBS: WBAT  - /220    Ortho Pager [phone]

## 2021-10-20 ENCOUNTER — TRANSCRIPTION ENCOUNTER (OUTPATIENT)
Age: 56
End: 2021-10-20

## 2021-10-20 VITALS — WEIGHT: 179.9 LBS

## 2021-10-20 LAB
ANION GAP SERPL CALC-SCNC: 8 MMOL/L — SIGNIFICANT CHANGE UP (ref 5–17)
BUN SERPL-MCNC: 19 MG/DL — SIGNIFICANT CHANGE UP (ref 7–23)
CALCIUM SERPL-MCNC: 8.6 MG/DL — SIGNIFICANT CHANGE UP (ref 8.4–10.5)
CHLORIDE SERPL-SCNC: 105 MMOL/L — SIGNIFICANT CHANGE UP (ref 96–108)
CO2 SERPL-SCNC: 28 MMOL/L — SIGNIFICANT CHANGE UP (ref 22–31)
CREAT SERPL-MCNC: 0.86 MG/DL — SIGNIFICANT CHANGE UP (ref 0.5–1.3)
GLUCOSE SERPL-MCNC: 117 MG/DL — HIGH (ref 70–99)
HCT VFR BLD CALC: 32.5 % — LOW (ref 34.5–45)
HGB BLD-MCNC: 10.1 G/DL — LOW (ref 11.5–15.5)
MAGNESIUM SERPL-MCNC: 1.8 MG/DL — SIGNIFICANT CHANGE UP (ref 1.6–2.6)
MCHC RBC-ENTMCNC: 29.5 PG — SIGNIFICANT CHANGE UP (ref 27–34)
MCHC RBC-ENTMCNC: 31.1 GM/DL — LOW (ref 32–36)
MCV RBC AUTO: 95 FL — SIGNIFICANT CHANGE UP (ref 80–100)
NRBC # BLD: 0 /100 WBCS — SIGNIFICANT CHANGE UP (ref 0–0)
PLATELET # BLD AUTO: 214 K/UL — SIGNIFICANT CHANGE UP (ref 150–400)
POTASSIUM SERPL-MCNC: 4.3 MMOL/L — SIGNIFICANT CHANGE UP (ref 3.5–5.3)
POTASSIUM SERPL-SCNC: 4.3 MMOL/L — SIGNIFICANT CHANGE UP (ref 3.5–5.3)
RBC # BLD: 3.42 M/UL — LOW (ref 3.8–5.2)
RBC # FLD: 13.2 % — SIGNIFICANT CHANGE UP (ref 10.3–14.5)
SODIUM SERPL-SCNC: 141 MMOL/L — SIGNIFICANT CHANGE UP (ref 135–145)
WBC # BLD: 9.85 K/UL — SIGNIFICANT CHANGE UP (ref 3.8–10.5)
WBC # FLD AUTO: 9.85 K/UL — SIGNIFICANT CHANGE UP (ref 3.8–10.5)

## 2021-10-20 RX ORDER — SENNA PLUS 8.6 MG/1
2 TABLET ORAL
Qty: 0 | Refills: 0 | DISCHARGE
Start: 2021-10-20

## 2021-10-20 RX ORDER — OXYCODONE HYDROCHLORIDE 5 MG/1
1 TABLET ORAL
Qty: 42 | Refills: 0
Start: 2021-10-20 | End: 2021-10-26

## 2021-10-20 RX ORDER — GABAPENTIN 400 MG/1
0 CAPSULE ORAL
Qty: 0 | Refills: 0 | DISCHARGE

## 2021-10-20 RX ADMIN — Medication 975 MILLIGRAM(S): at 05:17

## 2021-10-20 RX ADMIN — OXYCODONE HYDROCHLORIDE 10 MILLIGRAM(S): 5 TABLET ORAL at 11:16

## 2021-10-20 RX ADMIN — OXYCODONE HYDROCHLORIDE 10 MILLIGRAM(S): 5 TABLET ORAL at 09:25

## 2021-10-20 RX ADMIN — FAMOTIDINE 20 MILLIGRAM(S): 10 INJECTION INTRAVENOUS at 05:17

## 2021-10-20 RX ADMIN — OXYCODONE HYDROCHLORIDE 10 MILLIGRAM(S): 5 TABLET ORAL at 03:42

## 2021-10-20 RX ADMIN — OXYCODONE HYDROCHLORIDE 10 MILLIGRAM(S): 5 TABLET ORAL at 02:42

## 2021-10-20 RX ADMIN — Medication 5 MILLIGRAM(S): at 05:17

## 2021-10-20 RX ADMIN — Medication 975 MILLIGRAM(S): at 06:17

## 2021-10-20 RX ADMIN — Medication 112 MICROGRAM(S): at 05:17

## 2021-10-20 NOTE — DISCHARGE NOTE NURSING/CASE MANAGEMENT/SOCIAL WORK - NSDCPEWEB_GEN_ALL_CORE
[facility] for Tobacco Control website --- [email or web address] website --- [email or web address]

## 2021-10-20 NOTE — DISCHARGE NOTE PROVIDER - NSDCFUSCHEDAPPT_GEN_ALL_CORE_FT
ANJELICA HOOD ; 11/02/2021 ; [facility] [address]  ANJELICA HOOD ; 01/18/2022 ; [facility] [address]

## 2021-10-20 NOTE — DISCHARGE NOTE NURSING/CASE MANAGEMENT/SOCIAL WORK - NSDCPEEMAIL_GEN_ALL_CORE
[facility] for Tobacco Control email [email or web address]

## 2021-10-20 NOTE — DIETITIAN INITIAL EVALUATION ADULT. - PROBLEM SELECTOR PLAN 1
Admit to Orthopaedic Service.  Presents today for elective L4-L5 TLIF  Pt medically stable and cleared for procedure today by Dr. Moctezuma

## 2021-10-20 NOTE — DISCHARGE NOTE PROVIDER - NSDCMRMEDTOKEN_GEN_ALL_CORE_FT
gabapentin: 700 milligram(s) orally once a day (at bedtime)  Levoxyl 112 mcg (0.112 mg) oral tablet: 1 tab(s) orally once a day  oxyCODONE 5 mg oral tablet: 1-2 tab(s) orally every 4 hours, As Needed for moderate to severe pain MDD:6  predniSONE 5 mg oral tablet: 1 tab(s) orally once a day  rOPINIRole 4 mg oral tablet: orally once a day (at bedtime)  senna oral tablet: 2 tab(s) orally once a day (at bedtime), As Needed for constipation

## 2021-10-20 NOTE — DIETITIAN INITIAL EVALUATION ADULT. - ADD RECOMMEND
1. Continue with Regular diet. monitor %PO intake and diet tolerance 2. Monitor wts/skin. 3. pain and bowel regimen per team. 4. RD diet education reinforcement prn. 1. Continue with Regular diet per pt request. monitor %PO intake and diet tolerance 2. Monitor wts/skin. 3. pain and bowel regimen per team. 4. RD diet education reinforcement prn.

## 2021-10-20 NOTE — DIETITIAN INITIAL EVALUATION ADULT. - OTHER INFO
55 y/o F PMHx of celiac disease, fibromyalgia, GERD, hypothyroidism. Admitted for elective TLIF L4-L5 (10/18) after c/o chronic lower back pain x 4 years. Now s/p sx pt is pending pt eval and drainage for d/c.      On assessment, pt was resting comfortably in her bed. Pt reports good intake, suspect > 75% of meals. No n/v/d reported, but she is experiencing some constipation and seems to only be passing gas. No NKFA besides gluten free diet guidelines. Skin WDL besides sx incision on her back. Harrison 20. PTA, pt reports mostly following a gluten free diet to manage her celiac disease, but occasionally (1x/day approximately) eats gluten containing products and has been doing so for years. PT reports her UBW is 135 pounds, and she gained 45 pounds in the last year d/t inability to exercise (33% gain over the course of a year). Pt reports usually following a keto diet at home to maintain her weight of 135 pounds, and plans to do that once she is feeling better and able to. Discussed vitamins and supplements the patient is taking, which she reports to be calcium, omega 3, and a MVI. Also discussed importance of vegetable consumption on a keto diet. Encouraged increase PO and protein to aid in the wound healing process. Pt was receptive. Please see nutrition recommendations below. 55 y/o F PMHx of celiac disease, fibromyalgia, GERD, hypothyroidism. Admitted for elective TLIF L4-L5 (10/18) after c/o chronic lower back pain x 4 years. Now s/p sx pt is pending PT eval and drainage for d/c.      On assessment, pt was resting comfortably in her bed. Pt reports good intake, suspect > 75% of meals. No n/v/d reported, but she is experiencing some constipation and seems to only be passing gas. NKFA but gluten intolerance 2/2 celiac disease. Skin WDL besides sx incision on her back. Harrison 20. Denies pain at this time. PTA, pt reports mostly following a gluten free diet to manage her celiac disease, but occasionally (1x/day approximately) eats gluten containing products and has been doing so for years. Pt reports her UBW is 135 pounds, and she gained 45 pounds in the last year d/t inability to exercise (33% gain over the course of a year). Pt reports usually following a keto diet at home to maintain her usual weight of 135 pounds, and plans to do that once she is feeling better and able to. Discussed vitamins and supplements the patient is taking, which she reports to be calcium, omega 3, and a MVI. Also discussed importance of vegetable consumption on a keto diet. Encouraged increase PO and protein to aid in the wound healing process. Pt was receptive. Please see nutrition recommendations below.

## 2021-10-20 NOTE — DISCHARGE NOTE PROVIDER - HOSPITAL COURSE
Admitted  Surgery - L4-L5 TLIF  Toyin-op Antibiotics  Pain control  DVT prophylaxis  OOB/Physical Therapy

## 2021-10-20 NOTE — DISCHARGE NOTE PROVIDER - NSDCCPCAREPLAN_GEN_ALL_CORE_FT
PRINCIPAL DISCHARGE DIAGNOSIS  Diagnosis: Spinal stenosis of lumbar region  Assessment and Plan of Treatment:

## 2021-10-20 NOTE — PROGRESS NOTE ADULT - SUBJECTIVE AND OBJECTIVE BOX
Ortho Floor Note    Subjective:  Pt comfortable without complaints, pain controlled. Denies CP, SOB, N/V, numbness/tingling     Vital Signs Last 24 Hrs  T(C): 36.6 (20 Oct 2021 05:15), Max: 36.7 (19 Oct 2021 08:15)  T(F): 97.9 (20 Oct 2021 05:15), Max: 98.1 (19 Oct 2021 19:57)  HR: 80 (20 Oct 2021 05:15) (80 - 109)  BP: 99/64 (20 Oct 2021 05:15) (96/69 - 112/67)  BP(mean): --  RR: 16 (20 Oct 2021 05:15) (16 - 18)  SpO2: 96% (20 Oct 2021 05:15) (94% - 98%)    General: Pt Alert and oriented, NAD  DSG C/D/I, HV x 1 in place   Pulses: 2+ DP BLE   Sensation: SILT L2-S1 EHL/FHL/TA/GS 5/5 b/l LE     A/P: 56yFemale s/p L4-L5 TLIF on 10/18.  - Stable  - Pain Control  - DVT ppx: SCDs   - PT, WBS: WBAT  - HV 50/140    Ortho Pager [phone]

## 2021-10-20 NOTE — DISCHARGE NOTE NURSING/CASE MANAGEMENT/SOCIAL WORK - PATIENT PORTAL LINK FT
You can access the FollowMyHealth Patient Portal offered by [facility] by registering at the following website: [email or web address]. By joining [employer]’s FollowMyHealth portal, you will also be able to view your health information using other applications (apps) compatible with our system.

## 2021-10-20 NOTE — DISCHARGE NOTE PROVIDER - NSDCFUADDINST_GEN_ALL_CORE_FT
No strenuous activity (bending/twisting), heavy lifting, driving, exercising/gym activities or returning to work until cleared by MD.   You may shower on Saturday. Remove the outer dressing prior to shower.   Let soapy water fall over incision and pat dry.   No soaking in bathtubs.  Leave incision open to air. Keep incision clean and dry. Do not remove the dressing/tape overlying your incision.   Do not apply creams or ointments on or around the incision.   Try to have regular bowel movements, take stool softener or laxative if necessary.  May take pepcid for upset stomach.  May apply ice to affected areas to decrease swelling.  Call to schedule an appt with Dr. Oconnor for follow up, if you have staples or sutures they will be removed in office.  Contact your doctor if you experience: fever greater than 101.5, chills, chest pain, difficulty breathing, redness or excessive drainage around the incision, other concerns.  Follow up with your primary care provider.

## 2021-10-20 NOTE — DISCHARGE NOTE PROVIDER - CARE PROVIDER_API CALL
Vadim Oconnor)  Orthopaedic Surgery  [address], [address] [place] [address]  Phone: [phone]  Fax: [phone]  Follow Up Time: 2 weeks

## 2021-10-20 NOTE — DIETITIAN INITIAL EVALUATION ADULT. - PERTINENT MEDS FT
Tylenol  Dilaudid  Oxycodone  Dulcolax  Miralax  Senna  Pepcid  Prednisone  Requip  Folic Acid  MVI  Neurontin   Synthroid

## 2021-10-20 NOTE — DISCHARGE NOTE PROVIDER - NSDCACTIVITY_GEN_ALL_CORE
Do not drive or operate machinery/Walking - Indoors allowed/No heavy lifting/straining/Follow Instructions Provided by your Surgical Team

## 2021-10-20 NOTE — DIETITIAN INITIAL EVALUATION ADULT. - OTHER CALCULATIONS
%IBW = 156.5%; IBW used to calculate estimated energy needs as pts ABW >120% of IBW; adjusted for increased protein needs for wound healing purposes.

## 2021-10-27 DIAGNOSIS — M48.061 SPINAL STENOSIS, LUMBAR REGION WITHOUT NEUROGENIC CLAUDICATION: ICD-10-CM

## 2021-10-27 DIAGNOSIS — K90.0 CELIAC DISEASE: ICD-10-CM

## 2021-10-27 DIAGNOSIS — Z88.1 ALLERGY STATUS TO OTHER ANTIBIOTIC AGENTS STATUS: ICD-10-CM

## 2021-10-27 DIAGNOSIS — Z88.0 ALLERGY STATUS TO PENICILLIN: ICD-10-CM

## 2021-10-27 DIAGNOSIS — E03.9 HYPOTHYROIDISM, UNSPECIFIED: ICD-10-CM

## 2021-10-27 DIAGNOSIS — M43.16 SPONDYLOLISTHESIS, LUMBAR REGION: ICD-10-CM

## 2021-10-27 DIAGNOSIS — K21.9 GASTRO-ESOPHAGEAL REFLUX DISEASE WITHOUT ESOPHAGITIS: ICD-10-CM

## 2021-10-27 DIAGNOSIS — M79.7 FIBROMYALGIA: ICD-10-CM

## 2021-10-27 DIAGNOSIS — M53.2X6 SPINAL INSTABILITIES, LUMBAR REGION: ICD-10-CM

## 2021-11-01 PROCEDURE — C1713: CPT

## 2021-11-01 PROCEDURE — C1889: CPT

## 2021-11-01 PROCEDURE — 76000 FLUOROSCOPY <1 HR PHYS/QHP: CPT

## 2021-11-01 PROCEDURE — 85027 COMPLETE CBC AUTOMATED: CPT

## 2021-11-01 PROCEDURE — 86850 RBC ANTIBODY SCREEN: CPT

## 2021-11-01 PROCEDURE — 86803 HEPATITIS C AB TEST: CPT

## 2021-11-01 PROCEDURE — 83735 ASSAY OF MAGNESIUM: CPT

## 2021-11-01 PROCEDURE — 86900 BLOOD TYPING SEROLOGIC ABO: CPT

## 2021-11-01 PROCEDURE — 86769 SARS-COV-2 COVID-19 ANTIBODY: CPT

## 2021-11-01 PROCEDURE — 86901 BLOOD TYPING SEROLOGIC RH(D): CPT

## 2021-11-01 PROCEDURE — 97161 PT EVAL LOW COMPLEX 20 MIN: CPT

## 2021-11-01 PROCEDURE — 36415 COLL VENOUS BLD VENIPUNCTURE: CPT

## 2021-11-01 PROCEDURE — 80048 BASIC METABOLIC PNL TOTAL CA: CPT

## 2021-11-02 ENCOUNTER — APPOINTMENT (OUTPATIENT)
Dept: RHEUMATOLOGY | Facility: CLINIC | Age: 56
End: 2021-11-02
Payer: COMMERCIAL

## 2021-11-02 PROCEDURE — 99212 OFFICE O/P EST SF 10 MIN: CPT | Mod: 95

## 2021-11-05 NOTE — HISTORY OF PRESENT ILLNESS
[Home] : at home, [unfilled] , at the time of the visit. [Medical Office: ([facility])___] : at the medical office located in  [FreeTextEntry1] : She had spinal fusion/laminectomy/decompression of L4-L5 at [facility] and had 4 screws and rods placed in her spine.  She has pain when moving/switching positions.  She takes 2 ES Tylenol in the morning and 1 tab in the afternoon, and oxycodone at night for pain control.  \par She continues taking Gabapentin to 700 mg at night as well.\par

## 2021-11-10 ENCOUNTER — RX RENEWAL (OUTPATIENT)
Age: 56
End: 2021-11-10

## 2021-11-23 PROBLEM — K90.0 CELIAC DISEASE: Chronic | Status: ACTIVE | Noted: 2021-10-15

## 2021-11-24 ENCOUNTER — APPOINTMENT (OUTPATIENT)
Dept: RHEUMATOLOGY | Facility: CLINIC | Age: 56
End: 2021-11-24
Payer: COMMERCIAL

## 2021-11-24 VITALS — HEIGHT: 63 IN | DIASTOLIC BLOOD PRESSURE: 60 MMHG | SYSTOLIC BLOOD PRESSURE: 120 MMHG | TEMPERATURE: 98 F

## 2021-11-24 PROCEDURE — 96372 THER/PROPH/DIAG INJ SC/IM: CPT

## 2021-11-24 RX ORDER — METHYLPRED ACET/NACL,ISO-OS/PF 80 MG/ML
80 VIAL (ML) INJECTION
Qty: 1 | Refills: 0 | Status: COMPLETED | OUTPATIENT
Start: 2021-11-24

## 2021-11-24 RX ORDER — KETOROLAC TROMETHAMINE 60 MG/2ML
60 INJECTION, SOLUTION INTRAMUSCULAR
Qty: 1 | Refills: 0 | Status: COMPLETED | OUTPATIENT
Start: 2021-11-24

## 2021-11-24 RX ADMIN — KETOROLAC TROMETHAMINE 2 MG/2ML: 60 INJECTION, SOLUTION INTRAMUSCULAR at 00:00

## 2021-11-24 RX ADMIN — Medication 1 MG/ML: at 00:00

## 2021-12-03 ENCOUNTER — RX RENEWAL (OUTPATIENT)
Age: 56
End: 2021-12-03

## 2021-12-19 ENCOUNTER — RESULT REVIEW (OUTPATIENT)
Age: 56
End: 2021-12-19

## 2022-01-03 ENCOUNTER — APPOINTMENT (OUTPATIENT)
Dept: BARIATRICS/WEIGHT MGMT | Facility: CLINIC | Age: 57
End: 2022-01-03
Payer: COMMERCIAL

## 2022-01-03 VITALS — HEIGHT: 63 IN | BODY MASS INDEX: 33.66 KG/M2 | WEIGHT: 190 LBS

## 2022-01-03 DIAGNOSIS — K21.00 GASTRO-ESOPHAGEAL REFLUX DISEASE WITH ESOPHAGITIS, WITHOUT BLEEDING: ICD-10-CM

## 2022-01-03 PROCEDURE — 99205 OFFICE O/P NEW HI 60 MIN: CPT | Mod: 95

## 2022-01-03 NOTE — CONSULT LETTER
[Dear  ___] : Dear  [unfilled], [Consult Letter:] : I had the pleasure of evaluating your patient, [unfilled]. [Please see my note below.] : Please see my note below. [Consult Closing:] : Thank you very much for allowing me to participate in the care of this patient.  If you have any questions, please do not hesitate to contact me. [Sincerely,] : Sincerely, [FreeTextEntry1] : She is interested in taking medication for the management of obesity. We discussed the option to treat with a GLP1 to help increase satiety and decrease her hunger. She plans on incorporating lifestyle modifications. I will continue to see her to educate her further on dietary modifications and increasing activity level over time.  [FreeTextEntry3] : Chastity Martinez, NP

## 2022-01-03 NOTE — ASSESSMENT
[FreeTextEntry1] : Dietary Modification Education provided. Recommend a diet high in vegetables intake & lean protein. Recommend eating complex carbs instead of simple carbs. Recommend meal planning/prep and bringing healthy snacks and lunch options to work including- boiled eggs, vegetables, hummus, grilled chicken on salad. \par Physical Activity- recommend aerobic exercise 3-4 times per week for 30-45 mins- exercise goal to start walking outside during her lunch break 2 x per week. Sent information on free exercise videos via email. \par Labs- reviewed lab results from pre-op in October, ordered fasting labs to check HgA1c, Insulin, Lipids, TSH. Has a scheduled physical in a few weeks- labs to be done at that appointment. \par Medication- pt meets criteria to initiate medication treatment of obesity. Discussed different options for obesity treatment at this point. Pt would like to try GLP1 medication\par Discussed the option to treat obesity with Wegovy. Patient denies contraindications to taking Wrgovy: denies family hx or personal history of medullary thyroid carcinoma or MEN 2, denies history of pancreatitis/gallbladder disease/hypoglycemia/renal impairment.  Discussed mechanism of action- works like GLP-1 by regulating appetite/ reducing hunger. Reviewed side effects including: N/V/D/C, injection site reactions, low blood sugar. Discussed injection education & titration schedule.Sent Rx for Wegovy- plans to discuss further with PCP at physical. Sent educational video on injection technique and use of the Wegovy pen via email. \par RTO in 2-4 weeks for lab review and accountability with lifestyle modification. \par \par

## 2022-01-03 NOTE — HISTORY OF PRESENT ILLNESS
[Other Location: e.g. School (Enter Location, City,State)___] : at [unfilled], at the time of the visit. [Other Location: e.g. Home (Enter Location, City,State)___] : at [unfilled] [Verbal consent obtained from patient] : the patient, [unfilled] [FreeTextEntry1] : 57 y/o female here for medical weight loss consultation\par Concerned that she has gained 50 lbs since having back issues and during this COVID 19 pandemic. Has tried dietary modification, but is struggling with staying motivated and frustrated that she cannot make the right choices with food or exercise at this time. \par C/o low energy\par Has tried phentermine in the past with good success, lowest adult weight 120 lbs, current weight approximately 190 lbs. Denies trying any additional medication for weight loss. \par Medical Hx: fibromyalgia, GERD, Celiac, hypothyroidism, colon polyp, + post menopausal, lumbar radiculopathy\par Surgical Hx: Lumbar lami with fusion 3 months ago- clear to do light exercise at this time (walking, stationary bike), rotator cuff repair, ankle surgery \par Food Recall: B- caramel M&Ms, L-tator tots, cheese, onions, tomatoes, ranch dressing, Snacks- cookies, potato chips.\par Water Intake- inadequate, denies drinking soda or juice, has 1-2 cups of coffee daily with sugar free creamer \par Exercise- denies formal exercise routine at present\par Sleep- 6-7 hours most nights, goes through periods of inadequate sleep, + snoring while on back\par Stress/ Emotional Health- + 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

## 2022-01-04 ENCOUNTER — APPOINTMENT (OUTPATIENT)
Dept: RHEUMATOLOGY | Facility: CLINIC | Age: 57
End: 2022-01-04
Payer: COMMERCIAL

## 2022-01-04 PROCEDURE — 96372 THER/PROPH/DIAG INJ SC/IM: CPT

## 2022-01-04 RX ORDER — KETOROLAC TROMETHAMINE 60 MG/2ML
60 INJECTION, SOLUTION INTRAMUSCULAR
Qty: 1 | Refills: 0 | Status: COMPLETED | OUTPATIENT
Start: 2022-01-04

## 2022-01-04 RX ORDER — METHYLPRED ACET/NACL,ISO-OS/PF 80 MG/ML
80 VIAL (ML) INJECTION
Qty: 1 | Refills: 0 | Status: COMPLETED | OUTPATIENT
Start: 2022-01-04

## 2022-01-04 RX ADMIN — KETOROLAC TROMETHAMINE 2 MG/2ML: 60 INJECTION, SOLUTION INTRAMUSCULAR at 00:00

## 2022-01-04 RX ADMIN — Medication 0 MG/ML: at 00:00

## 2022-01-18 ENCOUNTER — APPOINTMENT (OUTPATIENT)
Dept: INTERNAL MEDICINE | Facility: CLINIC | Age: 57
End: 2022-01-18
Payer: COMMERCIAL

## 2022-01-18 ENCOUNTER — LABORATORY RESULT (OUTPATIENT)
Age: 57
End: 2022-01-18

## 2022-01-18 VITALS
BODY MASS INDEX: 32.96 KG/M2 | SYSTOLIC BLOOD PRESSURE: 118 MMHG | DIASTOLIC BLOOD PRESSURE: 84 MMHG | TEMPERATURE: 98.4 F | WEIGHT: 186 LBS | HEIGHT: 63 IN

## 2022-01-18 DIAGNOSIS — K90.0 CELIAC DISEASE: ICD-10-CM

## 2022-01-18 DIAGNOSIS — Z12.31 ENCOUNTER FOR SCREENING MAMMOGRAM FOR MALIGNANT NEOPLASM OF BREAST: ICD-10-CM

## 2022-01-18 DIAGNOSIS — Z00.00 ENCOUNTER FOR GENERAL ADULT MEDICAL EXAMINATION W/OUT ABNORMAL FINDINGS: ICD-10-CM

## 2022-01-18 PROCEDURE — 99396 PREV VISIT EST AGE 40-64: CPT | Mod: 25

## 2022-01-18 PROCEDURE — 36415 COLL VENOUS BLD VENIPUNCTURE: CPT

## 2022-01-18 RX ORDER — GABAPENTIN 100 MG/1
100 CAPSULE ORAL TWICE DAILY
Qty: 180 | Refills: 3 | Status: DISCONTINUED | COMMUNITY
Start: 2020-11-13 | End: 2022-01-18

## 2022-01-18 NOTE — PHYSICAL EXAM
[No Acute Distress] : no acute distress [Well Nourished] : well nourished [Well Developed] : well developed [Well-Appearing] : well-appearing [Normal Sclera/Conjunctiva] : normal sclera/conjunctiva [EOMI] : extraocular movements intact [Normal Outer Ear/Nose] : the outer ears and nose were normal in appearance [Normal TMs] : both tympanic membranes were normal [No Lymphadenopathy] : no lymphadenopathy [Supple] : supple [No Carotid Bruits] : no carotid bruits [Pedal Pulses Present] : the pedal pulses are present [No Edema] : there was no peripheral edema [Normal Appearance] : normal in appearance [No Masses] : no palpable masses [No Nipple Discharge] : no nipple discharge [No Axillary Lymphadenopathy] : no axillary lymphadenopathy [Normal] : soft, non-tender, non-distended, no masses palpated, no HSM and normal bowel sounds [Normal Posterior Cervical Nodes] : no posterior cervical lymphadenopathy [Normal Anterior Cervical Nodes] : no anterior cervical lymphadenopathy [No CVA Tenderness] : no CVA  tenderness [No Spinal Tenderness] : no spinal tenderness [No Joint Swelling] : no joint swelling [Grossly Normal Strength/Tone] : grossly normal strength/tone [Coordination Grossly Intact] : coordination grossly intact [Normal Gait] : normal gait [Normal Affect] : the affect was normal [Alert and Oriented x3] : oriented to person, place, and time [Normal Insight/Judgement] : insight and judgment were intact [de-identified] : obese [de-identified] : tattoo, many moles, none suspicious

## 2022-01-18 NOTE — HISTORY OF PRESENT ILLNESS
[FreeTextEntry1] : physical  [de-identified] : Pt here for annual physical. She had back surgery in October which helped but now she notices pain in her buttock when she sits for a long time in the car. \par Pt has been following with obesity medicine and was started on Wegovy. She has not started to notice the effects as yet as she has only had one dose.\par Pt is not currently exercising due to her back surgery. She is able to walk but as it's cold out, she has not done so.

## 2022-01-18 NOTE — HEALTH RISK ASSESSMENT
[Good] : ~his/her~ current health as good [Very Good] : ~his/her~  mood as very good [Current] : Current [Yes] : Yes [2 - 4 times a month (2 pts)] : 2-4 times a month (2 points) [No] : In the past 12 months have you used drugs other than those required for medical reasons? No [No falls in past year] : Patient reported no falls in the past year [0] : 2) Feeling down, depressed, or hopeless: Not at all (0) [HIV Test offered] : HIV Test offered [Hepatitis C test offered] : Hepatitis C test offered [Patient reported mammogram was normal] : Patient reported mammogram was normal [Patient reported colonoscopy was normal] : Patient reported colonoscopy was normal [None] : None [Alone] : lives alone [Employed] : employed [] :  [# Of Children ___] : has [unfilled] children [Feels Safe at Home] : Feels safe at home [Fully functional (bathing, dressing, toileting, transferring, walking, feeding)] : Fully functional (bathing, dressing, toileting, transferring, walking, feeding) [Fully functional (using the telephone, shopping, preparing meals, housekeeping, doing laundry, using] : Fully functional and needs no help or supervision to perform IADLs (using the telephone, shopping, preparing meals, housekeeping, doing laundry, using transportation, managing medications and managing finances) [FreeTextEntry1] : none [de-identified] : 5 daily  [de-identified] : none [de-identified] : weight medication  [MammogramDate] : 07/19 [PapSmearDate] : 04/19 [ColonoscopyDate] : 10/20 [ColonoscopyComments] : needs in 2023 [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

## 2022-01-20 LAB
25(OH)D3 SERPL-MCNC: 22.4 NG/ML
ALBUMIN SERPL ELPH-MCNC: 4.8 G/DL
ALBUMIN SERPL ELPH-MCNC: 4.9 G/DL
ALP BLD-CCNC: 105 U/L
ALP BLD-CCNC: 108 U/L
ALT SERPL-CCNC: 23 U/L
ALT SERPL-CCNC: 25 U/L
ANION GAP SERPL CALC-SCNC: 14 MMOL/L
ANION GAP SERPL CALC-SCNC: 15 MMOL/L
AST SERPL-CCNC: 17 U/L
AST SERPL-CCNC: 18 U/L
BASOPHILS # BLD AUTO: 0.06 K/UL
BASOPHILS NFR BLD AUTO: 0.7 %
BILIRUB SERPL-MCNC: 0.3 MG/DL
BILIRUB SERPL-MCNC: 0.3 MG/DL
BUN SERPL-MCNC: 18 MG/DL
BUN SERPL-MCNC: 18 MG/DL
CALCIUM SERPL-MCNC: 10 MG/DL
CALCIUM SERPL-MCNC: 9.9 MG/DL
CHLORIDE SERPL-SCNC: 99 MMOL/L
CHLORIDE SERPL-SCNC: 99 MMOL/L
CHOLEST SERPL-MCNC: 284 MG/DL
CHOLEST SERPL-MCNC: 290 MG/DL
CO2 SERPL-SCNC: 25 MMOL/L
CO2 SERPL-SCNC: 26 MMOL/L
COVID-19 SPIKE DOMAIN ANTIBODY INTERPRETATION: POSITIVE
CREAT SERPL-MCNC: 0.92 MG/DL
CREAT SERPL-MCNC: 0.93 MG/DL
EOSINOPHIL # BLD AUTO: 0.14 K/UL
EOSINOPHIL NFR BLD AUTO: 1.6 %
ESTIMATED AVERAGE GLUCOSE: 126 MG/DL
GLUCOSE SERPL-MCNC: 102 MG/DL
GLUCOSE SERPL-MCNC: 103 MG/DL
HBA1C MFR BLD HPLC: 6 %
HCT VFR BLD CALC: 42.6 %
HDLC SERPL-MCNC: 63 MG/DL
HDLC SERPL-MCNC: 64 MG/DL
HGB BLD-MCNC: 13.3 G/DL
IMM GRANULOCYTES NFR BLD AUTO: 0.5 %
INSULIN P FAST SERPL-ACNC: 7 UU/ML
LDLC SERPL CALC-MCNC: 179 MG/DL
LDLC SERPL CALC-MCNC: 182 MG/DL
LYMPHOCYTES # BLD AUTO: 1.65 K/UL
LYMPHOCYTES NFR BLD AUTO: 19 %
MAN DIFF?: NORMAL
MCHC RBC-ENTMCNC: 28.6 PG
MCHC RBC-ENTMCNC: 31.2 GM/DL
MCV RBC AUTO: 91.6 FL
MONOCYTES # BLD AUTO: 0.55 K/UL
MONOCYTES NFR BLD AUTO: 6.3 %
NEUTROPHILS # BLD AUTO: 6.26 K/UL
NEUTROPHILS NFR BLD AUTO: 71.9 %
NONHDLC SERPL-MCNC: 222 MG/DL
NONHDLC SERPL-MCNC: 225 MG/DL
PLATELET # BLD AUTO: 281 K/UL
POTASSIUM SERPL-SCNC: 4.3 MMOL/L
POTASSIUM SERPL-SCNC: 4.5 MMOL/L
PROT SERPL-MCNC: 7.6 G/DL
PROT SERPL-MCNC: 7.7 G/DL
RBC # BLD: 4.65 M/UL
RBC # FLD: 14.1 %
SARS-COV-2 AB SERPL IA-ACNC: >250 U/ML
SODIUM SERPL-SCNC: 139 MMOL/L
SODIUM SERPL-SCNC: 139 MMOL/L
T3 SERPL-MCNC: 105 NG/DL
T4 FREE SERPL-MCNC: 1.7 NG/DL
TRIGL SERPL-MCNC: 213 MG/DL
TRIGL SERPL-MCNC: 216 MG/DL
TSH SERPL-ACNC: 3.72 UIU/ML
TSH SERPL-ACNC: 3.77 UIU/ML
URATE SERPL-MCNC: 6.4 MG/DL
WBC # FLD AUTO: 8.7 K/UL

## 2022-01-24 ENCOUNTER — APPOINTMENT (OUTPATIENT)
Dept: BARIATRICS/WEIGHT MGMT | Facility: CLINIC | Age: 57
End: 2022-01-24
Payer: COMMERCIAL

## 2022-01-24 VITALS — HEIGHT: 63 IN | BODY MASS INDEX: 32.96 KG/M2 | WEIGHT: 186 LBS

## 2022-01-24 PROCEDURE — 99214 OFFICE O/P EST MOD 30 MIN: CPT | Mod: 95

## 2022-01-24 NOTE — HISTORY OF PRESENT ILLNESS
[FreeTextEntry1] : 57 y/o female here for medical weight loss consultation\par Concerned that she has gained 50 lbs since having back issues and during this COVID 19 pandemic. Has tried dietary modification, but is struggling with staying motivated and frustrated that she cannot make the right choices with food or exercise at this time. \par C/o low energy\par Has tried phentermine in the past with good success, lowest adult weight 120 lbs, current weight approximately 190 lbs. Denies trying any additional medication for weight loss. \par Medical Hx: fibromyalgia, GERD, Celiac, hypothyroidism, colon polyp, + post menopausal, lumbar radiculopathy\par Surgical Hx: Lumbar lami with fusion 3 months ago- clear to do light exercise at this time (walking, stationary bike), rotator cuff repair, ankle surgery \par Food Recall: B- caramel M&Ms, L-tator tots, cheese, onions, tomatoes, ranch dressing, Snacks- cookies, potato chips.\par Water Intake- inadequate, denies drinking soda or juice, has 1-2 cups of coffee daily with sugar free creamer \par Exercise- denies formal exercise routine at present\par Sleep- 6-7 hours most nights, goes through periods of inadequate sleep, + snoring while on back\par Stress/ Emotional Health- + 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 Taking 0.25 mg SQ weekly, denies side effects. Has been meal prepping for work- brings greek yogurt, hummus with celery, hard boiled eggs. Snacks on popcorn or strawberries, avoiding sweets. Water intake- improved, able to get in 64 ounces some days. Dancing at home for exercise, plans to start walking outside once the weather improves. Labs done on 1/20/22- HgBA1c 6.0%, Cholesterol, LDL & Triglycerides elevated.\par

## 2022-01-24 NOTE — ASSESSMENT
[FreeTextEntry1] : Dietary Modification: continue current dietary changes. Meal planning/prep and bringing healthy snacks and lunch options to work including- boiled eggs, vegetables, hummus, grilled chicken on salad. \par Physical Activity- Continue to increase exercise regimen each week.\par Medication- continue to titrate up on Wegovy. Sent Rx for next 2 months supply\par RTO in 1 month\par \par

## 2022-02-08 ENCOUNTER — APPOINTMENT (OUTPATIENT)
Dept: RHEUMATOLOGY | Facility: CLINIC | Age: 57
End: 2022-02-08
Payer: COMMERCIAL

## 2022-02-08 PROCEDURE — 96372 THER/PROPH/DIAG INJ SC/IM: CPT

## 2022-02-08 RX ORDER — METHYLPRED ACET/NACL,ISO-OS/PF 80 MG/ML
80 VIAL (ML) INJECTION
Qty: 1 | Refills: 0 | Status: COMPLETED | OUTPATIENT
Start: 2022-02-08

## 2022-02-08 RX ORDER — KETOROLAC TROMETHAMINE 60 MG/2ML
60 INJECTION, SOLUTION INTRAMUSCULAR
Qty: 1 | Refills: 0 | Status: COMPLETED | OUTPATIENT
Start: 2022-02-08

## 2022-02-08 RX ADMIN — Medication 0 MG/ML: at 00:00

## 2022-02-08 RX ADMIN — KETOROLAC TROMETHAMINE 2 MG/2ML: 60 INJECTION, SOLUTION INTRAMUSCULAR at 00:00

## 2022-02-10 ENCOUNTER — RX RENEWAL (OUTPATIENT)
Age: 57
End: 2022-02-10

## 2022-02-24 ENCOUNTER — APPOINTMENT (OUTPATIENT)
Dept: BARIATRICS/WEIGHT MGMT | Facility: CLINIC | Age: 57
End: 2022-02-24
Payer: COMMERCIAL

## 2022-02-24 ENCOUNTER — APPOINTMENT (OUTPATIENT)
Dept: BARIATRICS/WEIGHT MGMT | Facility: CLINIC | Age: 57
End: 2022-02-24

## 2022-02-24 VITALS — WEIGHT: 177.3 LBS | SYSTOLIC BLOOD PRESSURE: 118 MMHG | DIASTOLIC BLOOD PRESSURE: 77 MMHG | BODY MASS INDEX: 31.41 KG/M2

## 2022-02-24 DIAGNOSIS — R53.83 OTHER MALAISE: ICD-10-CM

## 2022-02-24 DIAGNOSIS — R53.81 OTHER MALAISE: ICD-10-CM

## 2022-02-24 PROCEDURE — 99213 OFFICE O/P EST LOW 20 MIN: CPT

## 2022-02-26 LAB
FOLATE SERPL-MCNC: 10.6 NG/ML
T3 SERPL-MCNC: 91 NG/DL
T4 FREE SERPL-MCNC: 1.8 NG/DL
TSH SERPL-ACNC: 2.32 UIU/ML
VIT B12 SERPL-MCNC: 417 PG/ML

## 2022-02-26 NOTE — ASSESSMENT
[FreeTextEntry1] : 56 year old female with class I obesity and fatigue\par Will check B12 and TFT's\par Discussed continuing 0.5 of Wegovy vs. decreasing to 0.25mg- will let me know next week how she feels\par Prescription Zofran PRN for the next week, discussed importance of mindful eating\par Increase water, increase fiber from fruits and vegetables, can use OTC laxative PRN, start MVI\par

## 2022-02-26 NOTE — HISTORY OF PRESENT ILLNESS
[FreeTextEntry1] : 57 y/o female here for medical weight loss consultation\par Concerned that she has gained 50 lbs since having back issues and during this COVID 19 pandemic. Has tried dietary modification, but is struggling with staying motivated and frustrated that she cannot make the right choices with food or exercise at this time. \par C/o low energy\par Has tried phentermine in the past with good success, lowest adult weight 120 lbs, current weight approximately 190 lbs. Denies trying any additional medication for weight loss. \par Medical Hx: fibromyalgia, GERD, Celiac, hypothyroidism, colon polyp, + post menopausal, lumbar radiculopathy\par Surgical Hx: Lumbar lami with fusion 3 months ago- clear to do light exercise at this time (walking, stationary bike), rotator cuff repair, ankle surgery \par Food Recall: B- caramel M&Ms, L-tator tots, cheese, onions, tomatoes, ranch dressing, Snacks- cookies, potato chips.\par Water Intake- inadequate, denies drinking soda or juice, has 1-2 cups of coffee daily with sugar free creamer \par Exercise- denies formal exercise routine at present\par Sleep- 6-7 hours most nights, goes through periods of inadequate sleep, + snoring while on back\par Stress/ Emotional Health- + 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 Taking 0.25 mg SQ weekly, denies side effects. Has been meal prepping for work- brings greek yogurt, hummus with celery, hard boiled eggs. Snacks on popcorn or strawberries, avoiding sweets. Water intake- improved, able to get in 64 ounces some days. Dancing at home for exercise, plans to start walking outside once the weather improves. Labs done on 1/20/22- HgBA1c 6.0%, Cholesterol, LDL & Triglycerides elevated.\par \par 2/24/22- Patient on 2nd dose of 0.5mg/week.  +constipation and nausea.  Worst the first week and getting better.  Having BM's daily but they are small, has been using some Dulcolax.  +fatigue 6 hours of sleep nightly.  H/o B12 deficiency has taken B12 injections in the past.  Eating 3 small meals daily, inadequate water intake.

## 2022-03-01 ENCOUNTER — APPOINTMENT (OUTPATIENT)
Dept: BARIATRICS/WEIGHT MGMT | Facility: CLINIC | Age: 57
End: 2022-03-01

## 2022-03-22 ENCOUNTER — APPOINTMENT (OUTPATIENT)
Dept: BARIATRICS/WEIGHT MGMT | Facility: CLINIC | Age: 57
End: 2022-03-22
Payer: COMMERCIAL

## 2022-03-22 VITALS
SYSTOLIC BLOOD PRESSURE: 120 MMHG | RESPIRATION RATE: 16 BRPM | DIASTOLIC BLOOD PRESSURE: 80 MMHG | OXYGEN SATURATION: 94 % | HEIGHT: 63 IN | HEART RATE: 102 BPM | BODY MASS INDEX: 30.21 KG/M2 | WEIGHT: 170.5 LBS

## 2022-03-22 PROCEDURE — 99214 OFFICE O/P EST MOD 30 MIN: CPT

## 2022-03-22 RX ORDER — SEMAGLUTIDE 1 MG/.5ML
1 INJECTION, SOLUTION SUBCUTANEOUS
Qty: 1 | Refills: 0 | Status: DISCONTINUED | COMMUNITY
Start: 2022-01-24 | End: 2022-03-22

## 2022-03-22 RX ORDER — SEMAGLUTIDE 0.5 MG/.5ML
0.5 INJECTION, SOLUTION SUBCUTANEOUS
Qty: 1 | Refills: 0 | Status: DISCONTINUED | COMMUNITY
Start: 2022-01-04 | End: 2022-03-22

## 2022-03-22 RX ORDER — SEMAGLUTIDE 0.25 MG/.5ML
0.25 INJECTION, SOLUTION SUBCUTANEOUS
Qty: 1 | Refills: 0 | Status: DISCONTINUED | COMMUNITY
Start: 2022-01-04 | End: 2022-03-22

## 2022-03-22 RX ORDER — SEMAGLUTIDE 0.5 MG/.5ML
0.5 INJECTION, SOLUTION SUBCUTANEOUS
Qty: 1 | Refills: 0 | Status: DISCONTINUED | COMMUNITY
Start: 2022-01-24 | End: 2022-03-22

## 2022-03-22 NOTE — HISTORY OF PRESENT ILLNESS
[FreeTextEntry1] : 55 y/o female here for medical weight loss consultation\par Concerned that she has gained 50 lbs since having back issues and during this COVID 19 pandemic. Has tried dietary modification, but is struggling with staying motivated and frustrated that she cannot make the right choices with food or exercise at this time. \par C/o low energy\par Has tried phentermine in the past with good success, lowest adult weight 120 lbs, current weight approximately 190 lbs. Denies trying any additional medication for weight loss. \par Medical Hx: fibromyalgia, GERD, Celiac, hypothyroidism, colon polyp, + post menopausal, lumbar radiculopathy\par Surgical Hx: Lumbar lami with fusion 3 months ago- clear to do light exercise at this time (walking, stationary bike), rotator cuff repair, ankle surgery \par Food Recall: B- caramel M&Ms, L-tator tots, cheese, onions, tomatoes, ranch dressing, Snacks- cookies, potato chips.\par Water Intake- inadequate, denies drinking soda or juice, has 1-2 cups of coffee daily with sugar free creamer \par Exercise- denies formal exercise routine at present\par Sleep- 6-7 hours most nights, goes through periods of inadequate sleep, + snoring while on back\par Stress/ Emotional Health- + 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 Taking 0.25 mg SQ weekly, denies side effects. Has been meal prepping for work- brings greek yogurt, hummus with celery, hard boiled eggs. Snacks on popcorn or strawberries, avoiding sweets. Water intake- improved, able to get in 64 ounces some days. Dancing at home for exercise, plans to start walking outside once the weather improves. Labs done on 1/20/22- HgBA1c 6.0%, Cholesterol, LDL & Triglycerides elevated.\par \par 3/22/22: Lost 7 lbs. Taking Wegovy 0.5 mg SQ weekly, + fatigue, +nausea, + emesis 3 times per week after laying down. Continues to eat small portions (strawberries, hummus with rice crackers, greek yogurt, salads), but feels full quickly. Took last dose 2 days ago. Water intake- inadequate. Unable to exercise due to fatigue. Has taken phentermine in the past with good success.

## 2022-03-22 NOTE — ASSESSMENT
[FreeTextEntry1] : Dietary Modification- continue to focus on lean protein, vegetables, and whole grains. Rec increasing water intake to 64 ounces daily. \par Physical Activity- recommend aerobic exercise 3-4 times per week for 30-45 mins- exercise goal to start walking outside when she feels better/more energy\par Labs- reviewed lab results, Vit B 12 normal, TFTs normal\par Medication- pt meets criteria to initiate medication treatment of obesity. Discussed different options for obesity treatment at this point. Discontinue Wegovy. Try phentermine 15 mg daily, can increase to 30 mg daily if ineffective & no side effects Pt denies contraindications including- uncontrolled HTN, hyperthyroidism, arrhythmias, CHD, CAD. Educated on common side effects: dry mouth, headaches, increased heart rate, difficulty sleeping, constipation, dizziness.\par RTO in 1 month for f/u apt\par

## 2022-03-24 ENCOUNTER — APPOINTMENT (OUTPATIENT)
Dept: RHEUMATOLOGY | Facility: CLINIC | Age: 57
End: 2022-03-24
Payer: COMMERCIAL

## 2022-03-24 VITALS
HEIGHT: 63 IN | DIASTOLIC BLOOD PRESSURE: 68 MMHG | WEIGHT: 170 LBS | OXYGEN SATURATION: 95 % | BODY MASS INDEX: 30.12 KG/M2 | HEART RATE: 93 BPM | SYSTOLIC BLOOD PRESSURE: 122 MMHG

## 2022-03-24 PROCEDURE — 96372 THER/PROPH/DIAG INJ SC/IM: CPT

## 2022-03-24 RX ORDER — METHYLPRED ACET/NACL,ISO-OS/PF 80 MG/ML
80 VIAL (ML) INJECTION
Qty: 1 | Refills: 0 | Status: COMPLETED | OUTPATIENT
Start: 2022-03-24

## 2022-03-24 RX ORDER — KETOROLAC TROMETHAMINE 60 MG/2ML
60 INJECTION, SOLUTION INTRAMUSCULAR
Qty: 1 | Refills: 0 | Status: COMPLETED | OUTPATIENT
Start: 2022-03-24

## 2022-03-24 RX ADMIN — Medication 0 MG/ML: at 00:00

## 2022-03-24 RX ADMIN — KETOROLAC TROMETHAMINE 2 MG/2ML: 60 INJECTION, SOLUTION INTRAMUSCULAR at 00:00

## 2022-04-21 ENCOUNTER — APPOINTMENT (OUTPATIENT)
Dept: BARIATRICS/WEIGHT MGMT | Facility: CLINIC | Age: 57
End: 2022-04-21
Payer: COMMERCIAL

## 2022-04-21 VITALS — HEIGHT: 63 IN | WEIGHT: 167 LBS | BODY MASS INDEX: 29.59 KG/M2

## 2022-04-21 PROCEDURE — 99214 OFFICE O/P EST MOD 30 MIN: CPT | Mod: 95

## 2022-04-21 NOTE — HISTORY OF PRESENT ILLNESS
[Other Location: e.g. School (Enter Location, City,State)___] : at [unfilled], at the time of the visit. [Other Location: e.g. Home (Enter Location, City,State)___] : at [unfilled] [FreeTextEntry1] : 55 y/o female here for medical weight loss consultation\par Concerned that she has gained 50 lbs since having back issues and during this COVID 19 pandemic. Has tried dietary modification, but is struggling with staying motivated and frustrated that she cannot make the right choices with food or exercise at this time. \par C/o low energy\par Has tried phentermine in the past with good success, lowest adult weight 120 lbs, current weight approximately 190 lbs. Denies trying any additional medication for weight loss. \par Medical Hx: fibromyalgia, GERD, Celiac, hypothyroidism, colon polyp, + post menopausal, lumbar radiculopathy\par Surgical Hx: Lumbar lami with fusion 3 months ago- clear to do light exercise at this time (walking, stationary bike), rotator cuff repair, ankle surgery \par Food Recall: B- caramel M&Ms, L-tator tots, cheese, onions, tomatoes, ranch dressing, Snacks- cookies, potato chips.\par Water Intake- inadequate, denies drinking soda or juice, has 1-2 cups of coffee daily with sugar free creamer \par Exercise- denies formal exercise routine at present\par Sleep- 6-7 hours most nights, goes through periods of inadequate sleep, + snoring while on back\par Stress/ Emotional Health- + 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 Taking 0.25 mg SQ weekly, denies side effects. Has been meal prepping for work- brings greek yogurt, hummus with celery, hard boiled eggs. Snacks on popcorn or strawberries, avoiding sweets. Water intake- improved, able to get in 64 ounces some days. Dancing at home for exercise, plans to start walking outside once the weather improves. Labs done on 1/20/22- HgBA1c 6.0%, Cholesterol, LDL & Triglycerides elevated.\par \par 3/22/22: Lost 7 lbs. Taking Wegovy 0.5 mg SQ weekly, + fatigue, +nausea, + emesis 3 times per week after laying down. Continues to eat small portions (strawberries, hummus with rice crackers, greek yogurt, salads), but feels full quickly. Took last dose 2 days ago. Water intake- inadequate. Unable to exercise due to fatigue. Has taken phentermine in the past with good success. \par \par 4/21/22: Lost 3 lbs, down 23 lbs total. Discontinued Wegovy and has been taking Phentermine 15-30 mg daily, tolerating well. Denies fatigue, but able to fall asleep at night. Reports increased cravings and increased episodes of emotional eating since discontinuing Wegovy. Walking outdoors 2 times per week, plans on getting a treadmill and has a goal to walk daily for 30 mins. Would like to re-try Wegovy or GLP1 to help with cravings.

## 2022-04-21 NOTE — ASSESSMENT
[FreeTextEntry1] : Dietary- focus on eating lean protein, vegetables, and whole grains. Try to practice mindful eating to help decrease emotional eating episodes. \par Increase water intake \par Physical Activity- goal to start walking 30 mins most days \par Medication- continue to take phentermine 15 mg daily as she is tolerating well without side effects. Plan to try Saxenda instead to help with cravings- discussed side effects and possibility of fatigue with this medication as well. Discussed titration schedule and how to use the saxenda pen\par RTO in 1 month\par \par

## 2022-04-22 NOTE — PRE-OP CHECKLIST - RESPIRATORY RATE (BREATHS/MIN)
· MIKKI 2 on admission  · Presenting with anterior chest pain, slightly reproducible on palpation, improved from presentation  · Troponin x2 negative, EKG without acute ischemic change  · Suspect secondary to shortness of breath and possible costochondritis  Repeat troponin/EKG if chest pain should markedly increased    Consider outpatient stress testing 16

## 2022-04-26 ENCOUNTER — RESULT REVIEW (OUTPATIENT)
Age: 57
End: 2022-04-26

## 2022-04-27 ENCOUNTER — RX RENEWAL (OUTPATIENT)
Age: 57
End: 2022-04-27

## 2022-04-28 ENCOUNTER — RESULT REVIEW (OUTPATIENT)
Age: 57
End: 2022-04-28

## 2022-05-11 ENCOUNTER — NON-APPOINTMENT (OUTPATIENT)
Age: 57
End: 2022-05-11

## 2022-05-11 ENCOUNTER — APPOINTMENT (OUTPATIENT)
Dept: RHEUMATOLOGY | Facility: CLINIC | Age: 57
End: 2022-05-11
Payer: COMMERCIAL

## 2022-05-11 VITALS
OXYGEN SATURATION: 98 % | DIASTOLIC BLOOD PRESSURE: 70 MMHG | HEIGHT: 63 IN | SYSTOLIC BLOOD PRESSURE: 110 MMHG | HEART RATE: 102 BPM | WEIGHT: 165 LBS | BODY MASS INDEX: 29.23 KG/M2

## 2022-05-11 PROCEDURE — 99213 OFFICE O/P EST LOW 20 MIN: CPT

## 2022-05-12 ENCOUNTER — APPOINTMENT (OUTPATIENT)
Dept: BREAST CENTER | Facility: CLINIC | Age: 57
End: 2022-05-12
Payer: COMMERCIAL

## 2022-05-12 ENCOUNTER — NON-APPOINTMENT (OUTPATIENT)
Age: 57
End: 2022-05-12

## 2022-05-12 DIAGNOSIS — R92.1 MAMMOGRAPHIC CALCIFICATION FOUND ON DIAGNOSTIC IMAGING OF BREAST: ICD-10-CM

## 2022-05-12 PROCEDURE — 99203 OFFICE O/P NEW LOW 30 MIN: CPT

## 2022-05-12 NOTE — REVIEW OF SYSTEMS
[Feeling Tired] : feeling tired [Heartburn] : heartburn [Joint Stiffness] : joint stiffness [Limb Pain] : limb pain [Negative] : Heme/Lymph

## 2022-05-12 NOTE — PHYSICAL EXAM
[Normocephalic] : normocephalic [Atraumatic] : atraumatic [EOMI] : extra ocular movement intact [Supple] : supple [No Supraclavicular Adenopathy] : no supraclavicular adenopathy [No Cervical Adenopathy] : no cervical adenopathy [Examined in the supine and seated position] : examined in the supine and seated position [No dominant masses] : no dominant masses in right breast  [No dominant masses] : no dominant masses left breast [No Nipple Retraction] : no left nipple retraction [No Nipple Discharge] : no left nipple discharge [Breast Mass Right Breast ___cm] : no masses [Breast Mass Left Breast ___cm] : no masses [Breast Nipple Inversion] : nipples not inverted [Breast Nipple Retraction] : nipples not retracted [Breast Nipple Flattening] : nipples not flattened [Breast Nipple Fissures] : nipples not fissured [Breast Abnormal Lactation (Galactorrhea)] : no galactorrhea [Breast Abnormal Secretion Bloody Fluid] : no bloody discharge [Breast Abnormal Secretion Serous Fluid] : no serous discharge [Breast Abnormal Secretion Opalescent Fluid] : no milky discharge [No Axillary Lymphadenopathy] : no left axillary lymphadenopathy [No Edema] : no edema [No Rashes] : no rashes [No Ulceration] : no ulceration [de-identified] : On exam, the patient has ptotic full C-cup breasts.  On palpation, I cannot feel any suspicious densities in either breast.  She has no axillary, supraclavicular, or cervical adenopathy.

## 2022-05-12 NOTE — ASSESSMENT
[FreeTextEntry1] : The patient is a 56-year-old G1, P0 postmenopausal white female with [place], [place], and [place] descent.  She underwent menopause around age 44 in 2012 and never took any hormone replacement therapy.  She underwent menarche at age 9.  She has never had any breast biopsies.  She has a family history with her paternal cousin who had breast cancer around age 45 and did undergo chemotherapy.  She does not know her family history well.  She underwent a recent screening bilateral mammography on April 26, 2022 here at [facility] showing some developing calcifications in the left breast upper outer quadrant.  Her prior mammography was in 2019.  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  On exam, I cannot feel any suspicious densities even with close attention to the upper outer aspect of the left breast.  Given that her last mammography was back in 2019, I agree with the reading and recommendation for a follow-up diagnostic left breast mammography in 6 months around October 2022.  She understands a small possibility that this could be an early developing cancer and that this area should declare itself within the next 6 months.  I would like to see her again at that time.  The patient understands and agrees to proceed as planned.

## 2022-05-12 NOTE — REASON FOR VISIT
[Initial Evaluation] : an initial evaluation [FreeTextEntry1] : The patient comes in with a recent mammography showing some likely benign calcifications in the left breast upper outer quadrant for which follow-up diagnostic mammography is being recommended in 6 months.

## 2022-05-12 NOTE — HISTORY OF PRESENT ILLNESS
[FreeTextEntry1] : The patient is a 56-year-old G1, P0 postmenopausal white female with [place], [place], and [place] descent.  She underwent menopause around age 44 in 2012 and never took any hormone replacement therapy.  She underwent menarche at age 9.  She has never had any breast biopsies.  She has a family history with her paternal cousin who had breast cancer around age 45 and did undergo chemotherapy.  She does not know her family history well.  She underwent a recent screening bilateral mammography on April 26, 2022 here at [facility] showing some developing calcifications in the left breast upper outer quadrant.  Her prior mammography was in 2019.  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  She comes in now for a surgical evaluation.

## 2022-05-20 NOTE — PRE-OP CHECKLIST - WAS PATIENT ON BETA BLOCKER?
----- Message from Rob Chatman sent at 5/20/2022 10:34 AM CDT -----  Contact: Patient  Patient need the nurse to call her      Call back #  [phone]     No

## 2022-05-23 ENCOUNTER — APPOINTMENT (OUTPATIENT)
Dept: RHEUMATOLOGY | Facility: CLINIC | Age: 57
End: 2022-05-23
Payer: COMMERCIAL

## 2022-05-23 ENCOUNTER — APPOINTMENT (OUTPATIENT)
Dept: INTERNAL MEDICINE | Facility: CLINIC | Age: 57
End: 2022-05-23

## 2022-05-23 PROCEDURE — 96372 THER/PROPH/DIAG INJ SC/IM: CPT

## 2022-05-23 RX ORDER — METHYLPRED ACET/NACL,ISO-OS/PF 80 MG/ML
80 VIAL (ML) INJECTION
Qty: 1 | Refills: 0 | Status: COMPLETED | OUTPATIENT
Start: 2022-05-23

## 2022-05-23 RX ORDER — KETOROLAC TROMETHAMINE 60 MG/2ML
60 INJECTION, SOLUTION INTRAMUSCULAR
Qty: 1 | Refills: 0 | Status: COMPLETED | OUTPATIENT
Start: 2022-05-23

## 2022-05-23 RX ADMIN — KETOROLAC TROMETHAMINE 2 MG/2ML: 30 INJECTION, SOLUTION INTRAMUSCULAR; INTRAVENOUS at 00:00

## 2022-05-23 RX ADMIN — Medication 1 MG/ML: at 00:00

## 2022-05-24 ENCOUNTER — APPOINTMENT (OUTPATIENT)
Dept: BARIATRICS/WEIGHT MGMT | Facility: CLINIC | Age: 57
End: 2022-05-24
Payer: COMMERCIAL

## 2022-05-24 VITALS
OXYGEN SATURATION: 99 % | SYSTOLIC BLOOD PRESSURE: 120 MMHG | HEIGHT: 63 IN | RESPIRATION RATE: 16 BRPM | DIASTOLIC BLOOD PRESSURE: 80 MMHG | HEART RATE: 94 BPM | BODY MASS INDEX: 29.06 KG/M2 | WEIGHT: 164 LBS

## 2022-05-24 PROCEDURE — 99214 OFFICE O/P EST MOD 30 MIN: CPT

## 2022-05-24 NOTE — HISTORY OF PRESENT ILLNESS
[FreeTextEntry1] : 55 y/o female here for medical weight loss consultation\par Concerned that she has gained 50 lbs since having back issues and during this COVID 19 pandemic. Has tried dietary modification, but is struggling with staying motivated and frustrated that she cannot make the right choices with food or exercise at this time. \par C/o low energy\par Has tried phentermine in the past with good success, lowest adult weight 120 lbs, current weight approximately 190 lbs. Denies trying any additional medication for weight loss. \par Medical Hx: fibromyalgia, GERD, Celiac, hypothyroidism, colon polyp, + post menopausal, lumbar radiculopathy\par Surgical Hx: Lumbar lami with fusion 3 months ago- clear to do light exercise at this time (walking, stationary bike), rotator cuff repair, ankle surgery \par Food Recall: B- caramel M&Ms, L-tator tots, cheese, onions, tomatoes, ranch dressing, Snacks- cookies, potato chips.\par Water Intake- inadequate, denies drinking soda or juice, has 1-2 cups of coffee daily with sugar free creamer \par Exercise- denies formal exercise routine at present\par Sleep- 6-7 hours most nights, goes through periods of inadequate sleep, + snoring while on back\par Stress/ Emotional Health- + 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 Taking 0.25 mg SQ weekly, denies side effects. Has been meal prepping for work- brings greek yogurt, hummus with celery, hard boiled eggs. Snacks on popcorn or strawberries, avoiding sweets. Water intake- improved, able to get in 64 ounces some days. Dancing at home for exercise, plans to start walking outside once the weather improves. Labs done on 1/20/22- HgBA1c 6.0%, Cholesterol, LDL & Triglycerides elevated.\par \par 3/22/22: Lost 7 lbs. Taking Wegovy 0.5 mg SQ weekly, + fatigue, +nausea, + emesis 3 times per week after laying down. Continues to eat small portions (strawberries, hummus with rice crackers, greek yogurt, salads), but feels full quickly. Took last dose 2 days ago. Water intake- inadequate. Unable to exercise due to fatigue. Has taken phentermine in the past with good success. \par \par 4/21/22: Lost 3 lbs, down 23 lbs total. Discontinued Wegovy and has been taking Phentermine 15-30 mg daily, tolerating well. Denies fatigue, but able to fall asleep at night. Reports increased cravings and increased episodes of emotional eating since discontinuing Wegovy. Walking outdoors 2 times per week, plans on getting a treadmill and has a goal to walk daily for 30 mins. Would like to re-try Wegovy or GLP1 to help with cravings.  \par \par 5/24/22: Lost 3 lb, down 26 lb total. Tolerating phentermine 15 mg daily & saxenda 2.4 mg SQ daily. Notices some fatigue but tolerating better than Wegovy and feels that the phentermine helps with this. Water intake- sufficient, continues to work on. Walking for exercise 1-3 miles 2 x per week, doesn't like the treadmill. Reports good appetite suppression and portion control. \par

## 2022-05-24 NOTE — ASSESSMENT
[FreeTextEntry1] : Continue current lifestyle modification\par Enc to incorporate strength training for exercise to increase muscle mass and inc BMR\par Medication- continue saxenda, can titrate up to 3 mg SQ daily when ready, Continue to take phentermine 15 mg daily. Discussed that we will be using phentermine off label after 3 months of treatment. Pt feels well on this medication and wants to continue use at this time. \par RTO in 1 month.\par

## 2022-06-06 NOTE — HISTORY OF PRESENT ILLNESS
[FreeTextEntry1] : She takes Requip 4 mg and Gabapentin 500 mg at night.  Taking any more makes her sleepy the next day.\par For the past week she has increased muscle cramps and her RLS is bad.\par \par She started Saxenda.

## 2022-06-15 NOTE — HISTORY OF PRESENT ILLNESS
[FreeTextEntry1] : 53F PMR (MTX, Prednisone), hypothyroidism, celiac disease, h/o colon polyps, h/o GERD. \par  presents for progressive nausea/vomiting - particularly over last 2 wks, with lesser sx over 2mo, and milder sx over 1year.  She is on GFD, with most recent TTG only weak pos 1/2019.CMET, CBC unrevealing 1/2019.  Sx not significantly improved on nexium.  No melena/brbpr/diarrhea/fevers.  She has vague diffuse abdominal pain over same period - mild.  Emesis mainly  at night - wakes her from sleep at around 3AM, but nauseous after most meals.\par \par Sees Dr. Miller for GERD - last seen 6 wks ago\par Some vomiting w/ MTX - stopped wks ago.  Was taking NSAIDs; remains on prednisone.\par \par Prior testing/eval w/ Dr. Miller:\par Sept 2018 GERD. Celiac serologies were elevated. \par EGD 10/19/18 for GERD and celiac,-small HH, and irregular zline\par duodenal bx- increase IEL\par gastric- chronic HP negative gastritis\par distal esophagus-c/w reflux\par proximal esophagus- no EoE \par \par US 2017 fatty liver\par Colonoscopy 07/2017-  colon polyps, diverticulosis, hemorrhoids. Rec: colonoscopy in 3 years. \par \par  labs 6/12/12-\par  DGP IgG-17, DGP IgA 6.3, TTG 43, anti- endomysial-positive, serum IgA-211\par  DQ2 -homozygous.\par \par Soc:  no tobacco or significant EtOH\par FHx: no FHx GI malignancy or IBD\par \par ROS:\par Constitutional:: no weight loss, fevers\par ENT: no deafness\par Eyes: not blind\par Neck: no LN\par Chest: no dyspnea/cough\par Cardiac: no chest pain\par Vascular: no leg swelling\par GI: no abdominal pain, nausea, vomiting, diarrhea, constipation, rectal bleeding, dysphagia, melena unless otherwise noted in HPI\par : no dysuria, dark urine\par Skin: no rashes, jaundice\par Heme: no bleeding\par \par Px: (VS noted below)\par General: NAD\par Eyes: anicteric\par Oropharynx:  clear\par Neck: no LN\par Chest: normal respiratory effort\par CVS: regular\par Abd: soft, moderate diffuse tenderness, but no rebound/guarding. ND, +BS, no HSM\par Ext: no atrophy\par Neuro: grossly nonfocal\par 
normal performance

## 2022-06-20 ENCOUNTER — RX RENEWAL (OUTPATIENT)
Age: 57
End: 2022-06-20

## 2022-07-05 ENCOUNTER — APPOINTMENT (OUTPATIENT)
Dept: RHEUMATOLOGY | Facility: CLINIC | Age: 57
End: 2022-07-05

## 2022-07-05 ENCOUNTER — APPOINTMENT (OUTPATIENT)
Dept: PAIN MANAGEMENT | Facility: CLINIC | Age: 57
End: 2022-07-05

## 2022-07-05 VITALS
SYSTOLIC BLOOD PRESSURE: 116 MMHG | OXYGEN SATURATION: 97 % | WEIGHT: 164 LBS | HEART RATE: 113 BPM | DIASTOLIC BLOOD PRESSURE: 60 MMHG | HEIGHT: 63 IN | BODY MASS INDEX: 29.06 KG/M2

## 2022-07-05 VITALS
DIASTOLIC BLOOD PRESSURE: 79 MMHG | SYSTOLIC BLOOD PRESSURE: 117 MMHG | HEIGHT: 63 IN | TEMPERATURE: 98 F | WEIGHT: 164 LBS | BODY MASS INDEX: 29.06 KG/M2

## 2022-07-05 DIAGNOSIS — Z20.822 CONTACT WITH AND (SUSPECTED) EXPOSURE TO COVID-19: ICD-10-CM

## 2022-07-05 PROCEDURE — 36415 COLL VENOUS BLD VENIPUNCTURE: CPT

## 2022-07-05 PROCEDURE — 96372 THER/PROPH/DIAG INJ SC/IM: CPT

## 2022-07-05 PROCEDURE — 99214 OFFICE O/P EST MOD 30 MIN: CPT | Mod: 25

## 2022-07-05 PROCEDURE — 99214 OFFICE O/P EST MOD 30 MIN: CPT

## 2022-07-05 RX ORDER — KETOROLAC TROMETHAMINE 60 MG/2ML
60 INJECTION, SOLUTION INTRAMUSCULAR
Qty: 1 | Refills: 0 | Status: COMPLETED | OUTPATIENT
Start: 2022-07-05

## 2022-07-05 RX ORDER — METHYLPRED ACET/NACL,ISO-OS/PF 80 MG/ML
80 VIAL (ML) INJECTION
Qty: 1 | Refills: 0 | Status: COMPLETED | OUTPATIENT
Start: 2022-07-05

## 2022-07-05 RX ADMIN — KETOROLAC TROMETHAMINE 2 MG/2ML: 60 INJECTION, SOLUTION INTRAMUSCULAR at 00:00

## 2022-07-05 RX ADMIN — Medication 0 MG/ML: at 00:00

## 2022-07-05 NOTE — PHYSICAL EXAM
[de-identified] : Constitutional: Well-developed, in no acute distress\par \par Neurological: Memory normal, AAO x 3, Cranial nerves II - XII grossly normal\par Psychiatric: Appropriate mood and affect, oriented to time, place, person, and situation\par

## 2022-07-05 NOTE — HISTORY OF PRESENT ILLNESS
[___ mths] : [unfilled] month(s) ago [Constant] : constant [4] : an average pain level of 4/10 [Dull] : dull [Aching] : aching [Burning] : burning [Shooting] : shooting [Electric] : electric [Sitting] : sitting [Medications] : medications [2] : a minimum pain level of 2/10 [6] : a maximum pain level of 6/10 [FreeTextEntry1] : Interval History:\par Since her last visit she underwent TANNA without relief. She eventually underwent a fusion w/ Dr. Gonsales in October 2021. Surgery was beneficial at first and then her pain began to come back. She has numbness down her right leg. She has been recommended for an injectio . \par \par HPI: 54 yof presents w/ low back and right leg pain. She had back pain and a right foot drop for months and eventually had surgery in September of 2019 (Dr. Gonsales, laminectomy). Surgery went well and she had no problems following this. Her numbness resolved. Then, her numbness began to return. She also has begun to develop a right foot drop. Pain is dull and throbbing in the right leg. The pain is 6/10 in intensity at its worst. Her exertion makes the pain worse. Pain improves with rest. She has seen her surgeon who advised against further surgery at this time.\par \par Interventions:\par Right L4-L5 and L5-S1 TFESI (12/10/19): [FreeTextEntry2] : 12 [FreeTextEntry7] : slipped disc  [de-identified] : numbness [FreeTextEntry3] : over exertion

## 2022-07-05 NOTE — ASSESSMENT
[FreeTextEntry1] : 54 yof presents s/p right L4-L5 fusion w/ low back and right leg pain here for follow-up. \par \par I have personally reviewed the patient's MRI in detail and discussed it with them which is significant fo rbilateral quadratus femoris atrophy and narrowing of the ischiofemoral spaces which suggests chronic \par ischiofemoral impingement. Lumbar spine has been decompressed. \par \par Dr. Asim Bai has advised a right hip image-guided quadratus femoris injection with steroid and anesthetic. \par \par At this time I am not sure whether her pain is from her spine or her hip. \par \par She will start her medrol dose pack and a course of physical therapy. If no relief we will plan for TANNA or quadratus femoris injection.\par \par Physical therapy prescribed - goal will be to increase ROM, strengthening, postural training, other modalities ad lynn which may include massage and stim. Goals of therapy discussed with the patient in detail and will be discussed with physical therapist. Patient will follow-up following course of physical therapy to monitor progress and adjust therapy as needed.\par \par Acetaminophen 1,000 mg q8h prn for moderate pain. Risks, benefits, and alternatives of acetaminophen discussed with patient.\par \par Ibuprofen 600 mg q8h prn add when pain is not adequately controlled with acetaminophen. Risks, benefits, and alternatives of ibuprofen discussed with patient.\par \par  \par \par \par \par

## 2022-07-05 NOTE — DATA REVIEWED
[FreeTextEntry1] : Exam: MRI LUMBAR SPINE WAW IC \par Order#: MRI [id number] \par \par \par \par LUMBAR POST LAMINECTOMY SYNDROME \par \par  Technique: MRI OF THE LUMBAR SPINE WITH CONTRAST. \par  MRI of the lumbar spine was performed utilizing axial and sagittal T1 and T2-weighted images. \par Sagittal STIR images were also acquired. Following the administration of 7 cc of Gadavist, sagittal \par and axial Byrne fat saturated T1-weighted images were obtained. \par \par  Findings: Comparison is made to a prior MRI of the lumbar spine performed on 8/2/2021 \par \par \par  There is partial sacralization of the L5 vertebral body with articulation on the left, unchanged. \par The most inferior largest and complete intravertebral disc space has been annotated as the L5/S1 \par disc space. If surgical manipulation is contemplated, would recommend correlation of this study to \par plain films of the entire spine for counting purposes.There are hypoplastic rudimentary ribs seen at\par the T12 level, unchanged. \par \par  The patient is now status post L4/L5 posterior fusion with vertical rods and transpedicular screws \par coursing the L4 and L5 pedicles. There is bone graft material overlying the L3/L4, and L4/L5 facets.\par \par \par  The patient is now status post left facetectomy of L4 and L5. The patient is also status post \par placement of a disc spacer within the left L4/L5 disc space consistent with L4/L5 interbody fusion. \par The patient is known to have prior L4/L5 bilateral brina-laminectomy and partial right facetectomy \par and foraminotomy \par \par  There has been interval appearance of an approximately 6.8 cm x 2.7 cm x 1.4 cm fluid collection \par peripheral enhancement within the midline posterior subcutaneous soft tissues overlying the \par posterior spinous processes and paraspinal muscles and spanning from L2/L3 through L5; findings are \par consistent with a postoperative seroma however superimposed infection cannot be excluded. There is \par T2 and STIR signal hyperintensity at the laminectomy site with patchy enhancement within the \par posterior paraspinal soft tissues. \par \par \par  There is minimal anterolisthesis of L4 and L5, unchanged. There is mild exaggeration of the lumbar \par lordosis. The remaining vertebral bodies are of normal height and configuration. There is moderate \par loss of disc height at the T11/T12 level with endplate changes and vacuum phenomena consistent with \par desiccation and degenerative disease. There is mild to moderate loss of disc height at the T12/L1, \par L1/L2 consistent with desiccation. The remaining intervertebral disc spaces demonstrate mild \par desiccation. There are mild anterior osteophyte seen at the T12/L1/L2 L2/L3 and mildly at L3/L4. \par There is diffuse osteopenia. \par \par  Evaluation of the individual levels demonstrates at the L5/S1 level there is no evidence of a disc \par herniation. There is bilateral mild facet degenerative changes. The bilateral neuroforamen and \par exiting L5 nerve roots are within normal limits. There is no evidence of spinal canal stenosis. \par \par  At the L4/L5 level there is minimal unroofing of the posterior aspect of the disc space due to the \par anterolisthesis. There is a minimal disc bulge and osteophyte complex contacting the ventral thecal \par sac. There has been status post decompression of the bilateral neuroforamen.. There has been spinal \par canal decompression at this level. \par \par  At the L3/L4 level there is a minimal diffuse disc bulge flattening the ventral thecal sac. There \par is severe facet and ligamentous hypertrophy. The bilateral neuroforamen are patent. There is no \par evidence of spinal canal stenosis. \par \par  At the L2/L3 and L1/L2 levels there are very minimal disc bulges, unchanged. There is mild facet \par hypertrophy. The bilateral neuroforamen are patent. There is no evidence of spinal canal stenosis. 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 \par \par  There is a small 0.8 cm cystic lesion seen in the midpole of the right kidney as well as small \par cystic lesion seen in the left kidney. \par \par \par \par \par  Impression: \par  PARTIAL SACRALIZATION OF THE L5 VERTEBRAL BODY WITH ARTICULATION ON THE LEFT, UNCHANGED. THE MOST \par INFERIOR LARGEST AND COMPLETE INTRAVERTEBRAL DISC SPACE HAS BEEN ANNOTATED AS THE L5/S1 DISC SPACE. \par IF SURGICAL MANIPULATION IS CONTEMPLATED, WOULD RECOMMEND CORRELATION OF THIS STUDY TO PLAIN FILMS \par OF THE ENTIRE SPINE FOR COUNTING PURPOSES.THERE ARE HYPOPLASTIC RUDIMENTARY RIBS SEEN AT THE T12 \par LEVEL, UNCHANGED. \par \par  Status post L4/L5 posterior fusion with new left with prior right facetectomy and foraminotomy. \par Prior L4 and L5 laminectomies. Bone graft placement without evidence of fusion. \par \par  Minimal anterolisthesis of L4 and L5. \par \par  Degenerative changes of the lumbar spine, unchanged. \par \par \par  Interval appearance of an approximately 6.8 cm fluid collection within the midline posterior \par subcutaneous soft tissues overlying the posterior spinous processes and paraspinal muscles and \par spanning from L1 through S1; findings are consistent with a postoperative seroma however \par superimposed infection cannot be excluded. Recommend correlation with the patient's \par infectious/inflammatory parameters including C-reactive protein and sedimentation rate. \par \par \par  L5/S1 no evidence of focal disc herniation. L5/S1 no evidence of spinal canal stenosis, unchanged. \par \par  L4/L5 minimal disc bulge and osteophyte complex. Status post decompression of the bilateral \par neuroforamen.. There has been spinal canal decompression at this level. \par \par  L3/L4 minimal diffuse disc bulge. L3/L4 no evidence of spinal canal stenosis. \par \par  L2/L3 and L1/L2 levels very minimal disc bulges, unchanged. L2/L3 and L1/L2 no evidence of spinal \par canal stenosis. \par \par \par \par Exam: MRI HIP RT \par Order#: MRI [id number] \par \par \par \par MR HIP RIGHT \par \par  HISTORY: Right hip pain. \par \par  TECHNIQUE: Multiplanar MRI of the right hip was performed without contrast using 10 sequences. \par \par  COMPARISON: CT of the lumbar spine dated 2/1/2022 and MRI of the lumbar spine dated 8/20/2021. \par \par  FINDINGS: \par \par  OSSEOUS STRUCTURES \par  Fractures/Stress Reactions: None. \par \par  VISUALIZED SPINE \par  Lower lumbar postsurgical changes are again seen. \par \par  VISUALIZED PELVIC JOINTS \par  Sacroiliac Joints: Appear preserved. \par  Pubic symphysis: Preserved. \par \par  RIGHT HIP JOINT \par  Morphology: Normal. \par  Avascular Necrosis: None. \par  Articular Cartilage: Preserved. \par  Acetabular Labrum: There is tearing extending through the base of the anterior labrum. \par  Effusion/Synovitis: None. \par \par  TENDONS \par  Gluteus Minimus Tendon: Mild tendinopathy is present. \par  Gluteus Medius Tendon: Mild tendinopathy is present with slight intrasubstance tearing of the \par attachment. \par  Iliopsoas Tendon: Intact. \par  Common Hamstring Tendon: Intact. \par  Bursa: There is slight edema within the greater trochanteric bursa. \par \par  SOFT TISSUES \par  Pelvis: Bladder is severely distended. \par  Musculature: There is moderate atrophy of the bilateral quadratus femoris muscles on the body \par coil imaging with narrowing of the ischiofemoral spaces that measure approximately 1.1 cm on the ri\par ght and 0.7 cm on the left. Changes suggest chronic ischiofemoral impingement. Lower paraspinal \par muscle atrophy and edema is likely postoperative. \par  Subcutaneous Tissues: Small amount of fluid is again seen and partially imaged within the lower \par lumbar spine incision site suggesting seroma. \par \par  NEUROVASCULAR STRUCTURES \par  Sciatic nerve: Preserved. \par \par \par  IMPRESSION: \par  1. There is tearing of the anterior labrum. \par  2. Bilateral quadratus femoris atrophy and narrowing of the ischiofemoral spaces suggests chronic \par ischiofemoral impingement. \par  3. Bladder is severely distended. \par \par

## 2022-07-05 NOTE — REVIEW OF SYSTEMS
[Muscle Pain] : muscle pain [Radiating Pain] : radiating pain [Joint Pain] : joint pain [Numbness] : numbness [Negative] : Heme/Lymph [Feeling Tired] : fatigue [Diarrhea] : diarrhea [Joint Stiffness] : joint stiffness [de-identified] : weakness

## 2022-07-07 ENCOUNTER — APPOINTMENT (OUTPATIENT)
Dept: BARIATRICS/WEIGHT MGMT | Facility: CLINIC | Age: 57
End: 2022-07-07

## 2022-07-07 VITALS — HEIGHT: 63 IN | BODY MASS INDEX: 28.17 KG/M2 | WEIGHT: 159 LBS

## 2022-07-07 PROCEDURE — 99213 OFFICE O/P EST LOW 20 MIN: CPT | Mod: 95

## 2022-07-07 RX ORDER — PHENTERMINE HYDROCHLORIDE 15 MG/1
15 CAPSULE ORAL
Qty: 30 | Refills: 0 | Status: DISCONTINUED | COMMUNITY
Start: 2022-04-21 | End: 2022-07-07

## 2022-07-07 RX ORDER — PHENTERMINE HYDROCHLORIDE 15 MG/1
15 CAPSULE ORAL
Qty: 60 | Refills: 0 | Status: DISCONTINUED | COMMUNITY
Start: 2022-03-22 | End: 2022-07-07

## 2022-07-07 NOTE — ASSESSMENT
[FreeTextEntry1] : Celebrated her success thus far with weight loss & improved inflammatory markers\par Rec increasing exercise endurance\par Continue to work on getting adequate water and avoiding simple carbs to decrease cravings for carbs\par Labs- should check HgBA1c & Lipids \par Meds- can take 1-2 pills of phentermine daily to help with evening cravings and fatigue. \par RTO in 1 month 8/11 at 1p\par

## 2022-07-07 NOTE — HISTORY OF PRESENT ILLNESS
[Home] : at home, [unfilled] , at the time of the visit. [Other Location: e.g. Home (Enter Location, City,State)___] : at [unfilled] [Verbal consent obtained from patient] : the patient, [unfilled] [FreeTextEntry1] : 55 y/o female here for medical weight loss consultation\par Concerned that she has gained 50 lbs since having back issues and during this COVID 19 pandemic. Has tried dietary modification, but is struggling with staying motivated and frustrated that she cannot make the right choices with food or exercise at this time. \par C/o low energy\par Has tried phentermine in the past with good success, lowest adult weight 120 lbs, current weight approximately 190 lbs. Denies trying any additional medication for weight loss. \par Medical Hx: fibromyalgia, GERD, Celiac, hypothyroidism, colon polyp, + post menopausal, lumbar radiculopathy\par Surgical Hx: Lumbar lami with fusion 3 months ago- clear to do light exercise at this time (walking, stationary bike), rotator cuff repair, ankle surgery \par Food Recall: B- caramel M&Ms, L-tator tots, cheese, onions, tomatoes, ranch dressing, Snacks- cookies, potato chips.\par Water Intake- inadequate, denies drinking soda or juice, has 1-2 cups of coffee daily with sugar free creamer \par Exercise- denies formal exercise routine at present\par Sleep- 6-7 hours most nights, goes through periods of inadequate sleep, + snoring while on back\par Stress/ Emotional Health- + 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 Taking 0.25 mg SQ weekly, denies side effects. Has been meal prepping for work- brings greek yogurt, hummus with celery, hard boiled eggs. Snacks on popcorn or strawberries, avoiding sweets. Water intake- improved, able to get in 64 ounces some days. Dancing at home for exercise, plans to start walking outside once the weather improves. Labs done on 1/20/22- HgBA1c 6.0%, Cholesterol, LDL & Triglycerides elevated.\par \par 3/22/22: Lost 7 lbs. Taking Wegovy 0.5 mg SQ weekly, + fatigue, +nausea, + emesis 3 times per week after laying down. Continues to eat small portions (strawberries, hummus with rice crackers, greek yogurt, salads), but feels full quickly. Took last dose 2 days ago. Water intake- inadequate. Unable to exercise due to fatigue. Has taken phentermine in the past with good success. \par \par 4/21/22: Lost 3 lbs, down 23 lbs total. Discontinued Wegovy and has been taking Phentermine 15-30 mg daily, tolerating well. Denies fatigue, but able to fall asleep at night. Reports increased cravings and increased episodes of emotional eating since discontinuing Wegovy. Walking outdoors 2 times per week, plans on getting a treadmill and has a goal to walk daily for 30 mins. Would like to re-try Wegovy or GLP1 to help with cravings.  \par \par 5/24/22: Lost 3 lb, down 26 lb total. Tolerating phentermine 15 mg daily & saxenda 2.4 mg SQ daily. Notices some fatigue but tolerating better than Wegovy and feels that the phentermine helps with this. Water intake- sufficient, continues to work on. Walking for exercise 1-3 miles 2 x per week, doesn't like the treadmill. Reports good appetite suppression and portion control. \par \par 7/7/22: Lost 5 lbs, down 31 lbs total. Tolerating phentermine 15 mg & saxenda daily. Noticing some fatigue and increased cravings in the afternoon. Walks for exercise when she has energy, but often feels tired in the afternoon. Reviewed recent lab results. \par

## 2022-07-15 ENCOUNTER — APPOINTMENT (OUTPATIENT)
Dept: BARIATRICS/WEIGHT MGMT | Facility: CLINIC | Age: 57
End: 2022-07-15

## 2022-07-15 PROCEDURE — 36415 COLL VENOUS BLD VENIPUNCTURE: CPT

## 2022-07-18 LAB
CHOLEST SERPL-MCNC: 281 MG/DL
ESTIMATED AVERAGE GLUCOSE: 117 MG/DL
HBA1C MFR BLD HPLC: 5.7 %
HDLC SERPL-MCNC: 78 MG/DL
LDLC SERPL CALC-MCNC: 158 MG/DL
NONHDLC SERPL-MCNC: 203 MG/DL
TRIGL SERPL-MCNC: 227 MG/DL

## 2022-08-06 NOTE — HISTORY OF PRESENT ILLNESS
[FreeTextEntry1] : She developed back and right hip pain.  Had MRI and back was ok.  MRI hip showed narrowing in the hip.  She was referred to Dr. Sawyer for an injection, but he doesn’t think she needs one.  She was prescribed Medrol pack.\par \par Last week she had pain in her left arm.  After a few days she had to lift her arm with the other side.  On Sunday and Monday she took Prednisone 20 mg each (instead of 5 mg), iced it and took Aleve, and now it is better.\par \par easy bruising on legs.\par \par She has lost 30 pounds with Saxenda and Phentermine

## 2022-08-11 ENCOUNTER — APPOINTMENT (OUTPATIENT)
Dept: BARIATRICS/WEIGHT MGMT | Facility: CLINIC | Age: 57
End: 2022-08-11

## 2022-08-11 VITALS — HEIGHT: 63 IN | WEIGHT: 158 LBS | BODY MASS INDEX: 28 KG/M2

## 2022-08-11 PROCEDURE — 99213 OFFICE O/P EST LOW 20 MIN: CPT | Mod: 95

## 2022-08-11 RX ORDER — LIRAGLUTIDE 6 MG/ML
18 INJECTION, SOLUTION SUBCUTANEOUS DAILY
Qty: 3 | Refills: 1 | Status: DISCONTINUED | COMMUNITY
Start: 2022-04-21 | End: 2022-08-11

## 2022-08-11 NOTE — HISTORY OF PRESENT ILLNESS
[Other Location: e.g. School (Enter Location, City,State)___] : at [unfilled], at the time of the visit. [Other Location: e.g. Home (Enter Location, City,State)___] : at [unfilled] [Verbal consent obtained from patient] : the patient, [unfilled] [FreeTextEntry1] : 57 y/o female here for medical weight loss consultation\par Concerned that she has gained 50 lbs since having back issues and during this COVID 19 pandemic. Has tried dietary modification, but is struggling with staying motivated and frustrated that she cannot make the right choices with food or exercise at this time. \par C/o low energy\par Has tried phentermine in the past with good success, lowest adult weight 120 lbs, current weight approximately 190 lbs. Denies trying any additional medication for weight loss. \par Medical Hx: fibromyalgia, GERD, Celiac, hypothyroidism, colon polyp, + post menopausal, lumbar radiculopathy\par Surgical Hx: Lumbar lami with fusion 3 months ago- clear to do light exercise at this time (walking, stationary bike), rotator cuff repair, ankle surgery \par Food Recall: B- caramel M&Ms, L-tator tots, cheese, onions, tomatoes, ranch dressing, Snacks- cookies, potato chips.\par Water Intake- inadequate, denies drinking soda or juice, has 1-2 cups of coffee daily with sugar free creamer \par Exercise- denies formal exercise routine at present\par Sleep- 6-7 hours most nights, goes through periods of inadequate sleep, + snoring while on back\par Stress/ Emotional Health- + 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 Taking 0.25 mg SQ weekly, denies side effects. Has been meal prepping for work- brings greek yogurt, hummus with celery, hard boiled eggs. Snacks on popcorn or strawberries, avoiding sweets. Water intake- improved, able to get in 64 ounces some days. Dancing at home for exercise, plans to start walking outside once the weather improves. Labs done on 1/20/22- HgBA1c 6.0%, Cholesterol, LDL & Triglycerides elevated.\par \par 3/22/22: Lost 7 lbs. Taking Wegovy 0.5 mg SQ weekly, + fatigue, +nausea, + emesis 3 times per week after laying down. Continues to eat small portions (strawberries, hummus with rice crackers, greek yogurt, salads), but feels full quickly. Took last dose 2 days ago. Water intake- inadequate. Unable to exercise due to fatigue. Has taken phentermine in the past with good success. \par \par 4/21/22: Lost 3 lbs, down 23 lbs total. Discontinued Wegovy and has been taking Phentermine 15-30 mg daily, tolerating well. Denies fatigue, but able to fall asleep at night. Reports increased cravings and increased episodes of emotional eating since discontinuing Wegovy. Walking outdoors 2 times per week, plans on getting a treadmill and has a goal to walk daily for 30 mins. Would like to re-try Wegovy or GLP1 to help with cravings.  \par \par 5/24/22: Lost 3 lb, down 26 lb total. Tolerating phentermine 15 mg daily & saxenda 2.4 mg SQ daily. Notices some fatigue but tolerating better than Wegovy and feels that the phentermine helps with this. Water intake- sufficient, continues to work on. Walking for exercise 1-3 miles 2 x per week, doesn't like the treadmill. Reports good appetite suppression and portion control. \par \par 7/7/22: Lost 5 lbs, down 31 lbs total. Tolerating phentermine 15 mg & saxenda daily. Noticing some fatigue and increased cravings in the afternoon. Walks for exercise when she has energy, but often feels tired in the afternoon. Reviewed recent lab results. \par \par 8/11/22: Lost 1 lb, 32 lbs total. Tolerating phentermine 30 mg & saxenda 3 mg Daily. Reports increased cravings and hunger while at work. Walks for exercise- would like to join [employer] if one opens locally. Interested in trying Wegovy instead of saxenda to see if it helps with hunger. Tries to focus on eating healthy food throughout the day, but struggles at work.

## 2022-08-11 NOTE — ASSESSMENT
[FreeTextEntry1] : Dietary Modification Rec- focus on meal planning and eating more lean protein during lunch time to avoid afternoon cravings\par Exercise rec- continue to walk, enjoys taking classes- plans on joining [employer] when able\par Meds- switch to Wegovy instead of saxenda. Instructions given on when to stop saxenda and when to start wegovy. Can continue to take phentermine at this time as she is tolerating it well\par RTO in 1 month \par \par

## 2022-08-28 ENCOUNTER — RX RENEWAL (OUTPATIENT)
Age: 57
End: 2022-08-28

## 2022-08-31 ENCOUNTER — APPOINTMENT (OUTPATIENT)
Dept: RHEUMATOLOGY | Facility: CLINIC | Age: 57
End: 2022-08-31

## 2022-08-31 VITALS
BODY MASS INDEX: 28 KG/M2 | WEIGHT: 158 LBS | OXYGEN SATURATION: 99 % | HEIGHT: 63 IN | SYSTOLIC BLOOD PRESSURE: 116 MMHG | DIASTOLIC BLOOD PRESSURE: 70 MMHG | HEART RATE: 90 BPM

## 2022-08-31 LAB
25(OH)D3 SERPL-MCNC: 20.6 NG/ML
ALBUMIN SERPL ELPH-MCNC: 4.2 G/DL
ALP BLD-CCNC: 78 U/L
ALT SERPL-CCNC: 17 U/L
ANION GAP SERPL CALC-SCNC: 14 MMOL/L
AST SERPL-CCNC: 18 U/L
BASOPHILS # BLD AUTO: 0.02 K/UL
BASOPHILS NFR BLD AUTO: 0.2 %
BILIRUB SERPL-MCNC: 0.2 MG/DL
BUN SERPL-MCNC: 14 MG/DL
CALCIUM SERPL-MCNC: 9.5 MG/DL
CHLORIDE SERPL-SCNC: 107 MMOL/L
CO2 SERPL-SCNC: 23 MMOL/L
COVID-19 SPIKE DOMAIN ANTIBODY INTERPRETATION: POSITIVE
CREAT SERPL-MCNC: 0.77 MG/DL
CRP SERPL-MCNC: 4 MG/L
EGFR: 89 ML/MIN/1.73M2
EOSINOPHIL # BLD AUTO: 0.01 K/UL
EOSINOPHIL NFR BLD AUTO: 0.1 %
ERYTHROCYTE [SEDIMENTATION RATE] IN BLOOD BY WESTERGREN METHOD: 16 MM/HR
GLUCOSE SERPL-MCNC: 112 MG/DL
HCT VFR BLD CALC: 40.7 %
HGB BLD-MCNC: 12.7 G/DL
IMM GRANULOCYTES NFR BLD AUTO: 0.5 %
LYMPHOCYTES # BLD AUTO: 1.09 K/UL
LYMPHOCYTES NFR BLD AUTO: 13.2 %
MAN DIFF?: NORMAL
MCHC RBC-ENTMCNC: 30.5 PG
MCHC RBC-ENTMCNC: 31.2 GM/DL
MCV RBC AUTO: 97.8 FL
MONOCYTES # BLD AUTO: 0.39 K/UL
MONOCYTES NFR BLD AUTO: 4.7 %
NEUTROPHILS # BLD AUTO: 6.73 K/UL
NEUTROPHILS NFR BLD AUTO: 81.3 %
PLATELET # BLD AUTO: 274 K/UL
POTASSIUM SERPL-SCNC: 4.7 MMOL/L
PROT SERPL-MCNC: 6.4 G/DL
RBC # BLD: 4.16 M/UL
RBC # FLD: 13.8 %
SARS-COV-2 AB SERPL IA-ACNC: >250 U/ML
SODIUM SERPL-SCNC: 143 MMOL/L
WBC # FLD AUTO: 8.28 K/UL

## 2022-08-31 PROCEDURE — 96372 THER/PROPH/DIAG INJ SC/IM: CPT

## 2022-08-31 PROCEDURE — 99214 OFFICE O/P EST MOD 30 MIN: CPT | Mod: 25

## 2022-08-31 RX ORDER — KETOROLAC TROMETHAMINE 60 MG/2ML
60 INJECTION, SOLUTION INTRAMUSCULAR
Qty: 1 | Refills: 0 | Status: COMPLETED | OUTPATIENT
Start: 2022-08-31

## 2022-08-31 RX ORDER — METHYLPRED ACET/NACL,ISO-OS/PF 80 MG/ML
80 VIAL (ML) INJECTION
Qty: 1 | Refills: 0 | Status: COMPLETED | OUTPATIENT
Start: 2022-08-31

## 2022-08-31 RX ADMIN — KETOROLAC TROMETHAMINE 2 MG/2ML: 60 INJECTION, SOLUTION INTRAMUSCULAR at 00:00

## 2022-08-31 RX ADMIN — Medication 1 MG/ML: at 00:00

## 2022-09-08 NOTE — HISTORY OF PRESENT ILLNESS
[FreeTextEntry1] : She started to have tremors in her hands and feet at rest and with use.  She noticed it when pushing on the gas pedal while driving or when texting.

## 2022-09-19 ENCOUNTER — APPOINTMENT (OUTPATIENT)
Dept: BARIATRICS/WEIGHT MGMT | Facility: CLINIC | Age: 57
End: 2022-09-19

## 2022-09-19 VITALS — HEIGHT: 63 IN | WEIGHT: 155 LBS | BODY MASS INDEX: 27.46 KG/M2

## 2022-09-19 PROCEDURE — 99213 OFFICE O/P EST LOW 20 MIN: CPT | Mod: 95

## 2022-09-19 NOTE — HISTORY OF PRESENT ILLNESS
[Other Location: e.g. School (Enter Location, City,State)___] : at [unfilled], at the time of the visit. [Other Location: e.g. Home (Enter Location, City,State)___] : at [unfilled] [Verbal consent obtained from patient] : the patient, [unfilled] [FreeTextEntry1] : 55 y/o female here for medical weight loss consultation\par Concerned that she has gained 50 lbs since having back issues and during this COVID 19 pandemic. Has tried dietary modification, but is struggling with staying motivated and frustrated that she cannot make the right choices with food or exercise at this time. \par C/o low energy\par Has tried phentermine in the past with good success, lowest adult weight 120 lbs, current weight approximately 190 lbs. Denies trying any additional medication for weight loss. \par Medical Hx: fibromyalgia, GERD, Celiac, hypothyroidism, colon polyp, + post menopausal, lumbar radiculopathy\par Surgical Hx: Lumbar lami with fusion 3 months ago- clear to do light exercise at this time (walking, stationary bike), rotator cuff repair, ankle surgery \par Food Recall: B- caramel M&Ms, L-tator tots, cheese, onions, tomatoes, ranch dressing, Snacks- cookies, potato chips.\par Water Intake- inadequate, denies drinking soda or juice, has 1-2 cups of coffee daily with sugar free creamer \par Exercise- denies formal exercise routine at present\par Sleep- 6-7 hours most nights, goes through periods of inadequate sleep, + snoring while on back\par Stress/ Emotional Health- + 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 Taking 0.25 mg SQ weekly, denies side effects. Has been meal prepping for work- brings greek yogurt, hummus with celery, hard boiled eggs. Snacks on popcorn or strawberries, avoiding sweets. Water intake- improved, able to get in 64 ounces some days. Dancing at home for exercise, plans to start walking outside once the weather improves. Labs done on 1/20/22- HgBA1c 6.0%, Cholesterol, LDL & Triglycerides elevated.\par \par 3/22/22: Lost 7 lbs. Taking Wegovy 0.5 mg SQ weekly, + fatigue, +nausea, + emesis 3 times per week after laying down. Continues to eat small portions (strawberries, hummus with rice crackers, greek yogurt, salads), but feels full quickly. Took last dose 2 days ago. Water intake- inadequate. Unable to exercise due to fatigue. Has taken phentermine in the past with good success. \par \par 4/21/22: Lost 3 lbs, down 23 lbs total. Discontinued Wegovy and has been taking Phentermine 15-30 mg daily, tolerating well. Denies fatigue, but able to fall asleep at night. Reports increased cravings and increased episodes of emotional eating since discontinuing Wegovy. Walking outdoors 2 times per week, plans on getting a treadmill and has a goal to walk daily for 30 mins. Would like to re-try Wegovy or GLP1 to help with cravings.  \par \par 5/24/22: Lost 3 lb, down 26 lb total. Tolerating phentermine 15 mg daily & saxenda 2.4 mg SQ daily. Notices some fatigue but tolerating better than Wegovy and feels that the phentermine helps with this. Water intake- sufficient, continues to work on. Walking for exercise 1-3 miles 2 x per week, doesn't like the treadmill. Reports good appetite suppression and portion control. \par \par 7/7/22: Lost 5 lbs, down 31 lbs total. Tolerating phentermine 15 mg & saxenda daily. Noticing some fatigue and increased cravings in the afternoon. Walks for exercise when she has energy, but often feels tired in the afternoon. Reviewed recent lab results. \par \par 8/11/22: Lost 1 lb, 32 lbs total. Tolerating phentermine 30 mg & saxenda 3 mg Daily. Reports increased cravings and hunger while at work. Walks for exercise- would like to join [employer] if one opens locally. Interested in trying Wegovy instead of saxenda to see if it helps with hunger. Tries to focus on eating healthy food throughout the day, but struggles at work. \par \par 9/19/22: Lost 2 lbs, 35 lbs total. TOlerating phentermine 30 mg & saxenda 3 mg daily. Walking for exercise. Eating small portions and healthier meals.Cut back on smoking, reports that smoking is more social.

## 2022-09-19 NOTE — ASSESSMENT
[FreeTextEntry1] : Continue dietary lifestyle changes- focus on lean protein, vegetables & complex carb intake. 2-3 meals per day\par Physical Activity- recommend aerobic exercise 3-4 times per week for 30-45 mins- exercise goal to start walking outside during her lunch break 2 x per week & sign up [employer] when able\par Medication- continue to take saxenda and phentermine. Instructed to discuss current medication regimen with neurologist when she goes for consultation or tremors. \par RTO in 1 month \par \par

## 2022-10-07 ENCOUNTER — APPOINTMENT (OUTPATIENT)
Dept: RHEUMATOLOGY | Facility: CLINIC | Age: 57
End: 2022-10-07

## 2022-10-07 PROCEDURE — 96372 THER/PROPH/DIAG INJ SC/IM: CPT

## 2022-10-07 RX ORDER — KETOROLAC TROMETHAMINE 60 MG/2ML
60 INJECTION, SOLUTION INTRAMUSCULAR
Qty: 1 | Refills: 0 | Status: COMPLETED | OUTPATIENT
Start: 2022-10-07

## 2022-10-07 RX ORDER — METHYLPRED ACET/NACL,ISO-OS/PF 80 MG/ML
80 VIAL (ML) INJECTION
Qty: 1 | Refills: 0 | Status: COMPLETED | OUTPATIENT
Start: 2022-10-07

## 2022-10-07 RX ADMIN — METHYLPREDNISOLONE ACETATE 1 MG/ML: 80 INJECTION, SUSPENSION INTRA-ARTICULAR; INTRALESIONAL; INTRAMUSCULAR; SOFT TISSUE at 00:00

## 2022-10-07 RX ADMIN — KETOROLAC TROMETHAMINE 2 MG/2ML: 30 INJECTION, SOLUTION INTRAMUSCULAR at 00:00

## 2022-10-13 ENCOUNTER — RESULT REVIEW (OUTPATIENT)
Age: 57
End: 2022-10-13

## 2022-10-14 ENCOUNTER — APPOINTMENT (OUTPATIENT)
Dept: BREAST CENTER | Facility: CLINIC | Age: 57
End: 2022-10-14

## 2022-10-14 VITALS — OXYGEN SATURATION: 98 % | HEIGHT: 63 IN | HEART RATE: 94 BPM | BODY MASS INDEX: 27.82 KG/M2 | WEIGHT: 157 LBS

## 2022-10-14 DIAGNOSIS — Z80.3 FAMILY HISTORY OF MALIGNANT NEOPLASM OF BREAST: ICD-10-CM

## 2022-10-14 DIAGNOSIS — N60.12 DIFFUSE CYSTIC MASTOPATHY OF LEFT BREAST: ICD-10-CM

## 2022-10-14 DIAGNOSIS — N60.11 DIFFUSE CYSTIC MASTOPATHY OF LEFT BREAST: ICD-10-CM

## 2022-10-14 DIAGNOSIS — R92.0 MAMMOGRAPHIC MICROCALCIFICATION FOUND ON DIAGNOSTIC IMAGING OF BREAST: ICD-10-CM

## 2022-10-14 PROCEDURE — 99213 OFFICE O/P EST LOW 20 MIN: CPT

## 2022-10-14 NOTE — HISTORY OF PRESENT ILLNESS
[FreeTextEntry1] : The patient is a 57-year-old G1, P0 postmenopausal white female with [place], [place], and [place] descent.  She underwent menopause around age 44 in 2012 and never took any hormone replacement therapy.  She underwent menarche at age 9.  She has never had any breast biopsies.  She has a family history with her paternal cousin who had breast cancer around age 45 and did undergo chemotherapy.  She does not know her family history well.  She underwent a recent screening bilateral mammography on April 26, 2022 here at [facility] showing some developing calcifications in the left breast upper outer quadrant.  Her prior mammography was in 2019.  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  She comes in now for follow-up 6-month interval exam and underwent a diagnostic follow up 6 month left breast mammography.

## 2022-10-14 NOTE — REASON FOR VISIT
[Follow-Up: _____] : a [unfilled] follow-up visit [FreeTextEntry1] : The patient comes in with a recent mammography showing some likely benign calcifications in the left breast upper outer quadrant for which follow-up diagnostic mammography was being recommended in 6 months.  She comes in now for a 6-month interval follow-up exam.

## 2022-10-14 NOTE — ASSESSMENT
[FreeTextEntry1] : The patient is a 57-year-old G1, P0 postmenopausal white female with Krista, [place], and [place] descent.  She underwent menopause around age 44 in 2012 and never took any hormone replacement therapy.  She underwent menarche at age 9.  She has never had any breast biopsies.  She has a family history with her paternal cousin who had breast cancer around age 45 and did undergo chemotherapy.  She does not know her family history well.  She underwent a recent screening bilateral mammography on April 26, 2022 here at [place] showing some developing calcifications in the left breast upper outer quadrant.  Her prior mammography was in 2019.  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  She then underwent a follow-up diagnostic left breast mammography which was reviewed from October 13, 2022 continue to show these calcifications to be likely benign in course.  On exam, I cannot feel any suspicious densities even with close attention to the upper outer aspect of the left breast.  The patient was reassured and can go back to her yearly mammography and ultrasound which will be due again in April 2023 and she was given prescriptions.  She can then just follow-up on a yearly basis or can get her routine clinical yearly breast follow-up through her gynecologist.  The patient understands and agrees to proceed as planned.

## 2022-10-19 ENCOUNTER — RX RENEWAL (OUTPATIENT)
Age: 57
End: 2022-10-19

## 2022-10-25 ENCOUNTER — APPOINTMENT (OUTPATIENT)
Dept: BARIATRICS/WEIGHT MGMT | Facility: CLINIC | Age: 57
End: 2022-10-25

## 2022-11-02 ENCOUNTER — APPOINTMENT (OUTPATIENT)
Dept: NEUROLOGY | Facility: CLINIC | Age: 57
End: 2022-11-02

## 2022-11-02 ENCOUNTER — NON-APPOINTMENT (OUTPATIENT)
Age: 57
End: 2022-11-02

## 2022-11-02 VITALS
BODY MASS INDEX: 27.82 KG/M2 | WEIGHT: 157 LBS | HEART RATE: 106 BPM | SYSTOLIC BLOOD PRESSURE: 103 MMHG | HEIGHT: 63 IN | DIASTOLIC BLOOD PRESSURE: 73 MMHG

## 2022-11-02 DIAGNOSIS — G25.0 ESSENTIAL TREMOR: ICD-10-CM

## 2022-11-02 DIAGNOSIS — Z82.0 FAMILY HISTORY OF EPILEPSY AND OTHER DISEASES OF THE NERVOUS SYSTEM: ICD-10-CM

## 2022-11-02 PROCEDURE — 99204 OFFICE O/P NEW MOD 45 MIN: CPT

## 2022-11-02 RX ORDER — CAMPHOR 0.45 %
25 GEL (GRAM) TOPICAL
Refills: 0 | Status: DISCONTINUED | COMMUNITY
Start: 2019-01-23 | End: 2022-11-02

## 2022-11-02 RX ORDER — GABAPENTIN 400 MG/1
400 CAPSULE ORAL
Qty: 90 | Refills: 0 | Status: DISCONTINUED | COMMUNITY
Start: 2022-03-24 | End: 2022-11-02

## 2022-11-02 RX ORDER — ONDANSETRON 4 MG/1
4 TABLET, ORALLY DISINTEGRATING ORAL EVERY 6 HOURS
Qty: 20 | Refills: 0 | Status: DISCONTINUED | COMMUNITY
Start: 2022-02-24 | End: 2022-11-02

## 2022-11-02 RX ORDER — PHENTERMINE HYDROCHLORIDE 30 MG/1
30 CAPSULE ORAL
Qty: 30 | Refills: 1 | Status: DISCONTINUED | COMMUNITY
Start: 2022-07-07 | End: 2022-11-02

## 2022-11-02 RX ORDER — SEMAGLUTIDE 1 MG/.5ML
1 INJECTION, SOLUTION SUBCUTANEOUS
Qty: 1 | Refills: 0 | Status: DISCONTINUED | COMMUNITY
Start: 2022-08-11 | End: 2022-11-02

## 2022-11-02 NOTE — PHYSICAL EXAM
[FreeTextEntry1] : Mental Status: alert and oriented to exact date. Knows the day of the week. Able to calculate number of quarters in $1.50. Difficulty with serial sevens. Unable to spell "world" backwards. \par STM 3/3 immediate, 2/3 at 3 minutes \par CN: visual acuity, VFF, blink to confrontation \par III, IV, VI, PERRL, EOMI \par V sensation normal to light touch, pinprick\par VII normal squint vs resistance, normal smile, face symmetric \par VIII: normal hearing \par IX, X normal gag, symmetric palate, uvula raises midline \par XI normal shrug versus resistance and lateralization of head versus resistance \par XII tongue symmetric, normal strength, no tremor or fasciculation \par Sensory: normal to LT, diminished vibratory sensation distally, reports numbness on lateral sole of left foot \par Motor : normal tone, full strength throughout, no cogwheeling, no masked facies, mild sustention tremor \par normal handwriting and spiral drawing \par Reflexes\par Slight left reflex predominance 3+ left triceps, 2+ patellar on left, RUE 1+ , RLE 1+ b/l\par Plantar flexor response bilaterally \par Coordination: no dysmetria on FNF \par Gait : normal balance and gait, no ataxic movements, unable to toe or heel (left foot plantar fasciitis), able to tandem. Negative romberg \par

## 2022-11-02 NOTE — HISTORY OF PRESENT ILLNESS
[FreeTextEntry1] : Breanna is a 57-year-old left-handed woman with a past medical history of fibromyalgia, polymyalgia rheumatica, nicotine dependence, restless leg and celiac disease who is presenting to clinic for evaluation of tremor. Tremor is worse with action. No vocal tremor, no head tremor. \par \par Notices it more in the left hand when typing. She frequently makes mistakes. Noticed  also in the right foot when pushing on the pedal. Does not appear to be related to medications or anxiety. She has been on same medications for sometime. \par \par It can rarely interfere with eating. Sometimes interferes with drinking. 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

## 2022-11-02 NOTE — ASSESSMENT
[FreeTextEntry1] : Impression: essential tremor\par No medication needed at this time\par RTC in six months

## 2022-11-02 NOTE — DISCUSSION/SUMMARY
[FreeTextEntry1] : Breanna is a 57-year-old pleasant left-handed woman with a history of polymyalgia rheumatica (on prednisone), fibromyalgia, nicotine dependence, hypothyroidism, restless leg who is presenting to clinic for evaluation of tremor. Tremor is maximal in left hand and sometimes in right leg. Can be present at rest (not observed on current exam). Has been present for the past few months. Does not interfere with writing. No increase in tone, masked facies, rest tremor, slowed movements to suggest parkinsonism. \par As not significantly impacting her functioning, will observe for now. No medications needed for now. If worsening, can try propranolol.

## 2022-11-18 ENCOUNTER — APPOINTMENT (OUTPATIENT)
Dept: RHEUMATOLOGY | Facility: CLINIC | Age: 57
End: 2022-11-18

## 2022-11-18 PROCEDURE — 96372 THER/PROPH/DIAG INJ SC/IM: CPT

## 2022-11-18 RX ORDER — KETOROLAC TROMETHAMINE 60 MG/2ML
60 INJECTION, SOLUTION INTRAMUSCULAR
Qty: 1 | Refills: 0 | Status: COMPLETED | OUTPATIENT
Start: 2022-11-18

## 2022-11-18 RX ORDER — METHYLPRED ACET/NACL,ISO-OS/PF 80 MG/ML
80 VIAL (ML) INJECTION
Qty: 1 | Refills: 0 | Status: COMPLETED | OUTPATIENT
Start: 2022-11-18

## 2022-11-18 RX ADMIN — METHYLPREDNISOLONE ACETATE 1 MG/ML: 80 INJECTION, SUSPENSION INTRA-ARTICULAR; INTRALESIONAL; INTRAMUSCULAR; SOFT TISSUE at 00:00

## 2022-11-18 RX ADMIN — KETOROLAC TROMETHAMINE 2 MG/2ML: 30 INJECTION, SOLUTION INTRAMUSCULAR at 00:00

## 2022-11-30 ENCOUNTER — RX RENEWAL (OUTPATIENT)
Age: 57
End: 2022-11-30

## 2023-01-05 ENCOUNTER — LABORATORY RESULT (OUTPATIENT)
Age: 58
End: 2023-01-05

## 2023-01-05 ENCOUNTER — NON-APPOINTMENT (OUTPATIENT)
Age: 58
End: 2023-01-05

## 2023-01-05 ENCOUNTER — APPOINTMENT (OUTPATIENT)
Dept: FAMILY MEDICINE | Facility: CLINIC | Age: 58
End: 2023-01-05
Payer: COMMERCIAL

## 2023-01-05 VITALS
WEIGHT: 179 LBS | SYSTOLIC BLOOD PRESSURE: 130 MMHG | HEART RATE: 75 BPM | RESPIRATION RATE: 16 BRPM | DIASTOLIC BLOOD PRESSURE: 78 MMHG | HEIGHT: 63 IN | OXYGEN SATURATION: 98 % | BODY MASS INDEX: 31.71 KG/M2

## 2023-01-05 PROCEDURE — 99205 OFFICE O/P NEW HI 60 MIN: CPT | Mod: 25

## 2023-01-05 PROCEDURE — 93000 ELECTROCARDIOGRAM COMPLETE: CPT

## 2023-01-06 NOTE — PHYSICAL EXAM
[No Acute Distress] : no acute distress [Well Nourished] : well nourished [Well Developed] : well developed [Well-Appearing] : well-appearing [Normal Sclera/Conjunctiva] : normal sclera/conjunctiva [PERRL] : pupils equal round and reactive to light [EOMI] : extraocular movements intact [Normal Outer Ear/Nose] : the outer ears and nose were normal in appearance [Normal Oropharynx] : the oropharynx was normal [No JVD] : no jugular venous distention [No Lymphadenopathy] : no lymphadenopathy [Supple] : supple [Thyroid Normal, No Nodules] : the thyroid was normal and there were no nodules present [No Respiratory Distress] : no respiratory distress  [No Accessory Muscle Use] : no accessory muscle use [Normal Rate] : normal rate  [Regular Rhythm] : with a regular rhythm [Normal S1, S2] : normal S1 and S2 [No Murmur] : no murmur heard [No Carotid Bruits] : no carotid bruits [No Abdominal Bruit] : a ~M bruit was not heard ~T in the abdomen [No Varicosities] : no varicosities [Pedal Pulses Present] : the pedal pulses are present [No Edema] : there was no peripheral edema [No Palpable Aorta] : no palpable aorta [No Extremity Clubbing/Cyanosis] : no extremity clubbing/cyanosis [Soft] : abdomen soft [Non Tender] : non-tender [Non-distended] : non-distended [No Masses] : no abdominal mass palpated [No HSM] : no HSM [Normal Bowel Sounds] : normal bowel sounds [Normal Posterior Cervical Nodes] : no posterior cervical lymphadenopathy [Normal Anterior Cervical Nodes] : no anterior cervical lymphadenopathy [No CVA Tenderness] : no CVA  tenderness [No Spinal Tenderness] : no spinal tenderness [No Joint Swelling] : no joint swelling [Grossly Normal Strength/Tone] : grossly normal strength/tone [No Rash] : no rash [Coordination Grossly Intact] : coordination grossly intact [No Focal Deficits] : no focal deficits [Normal Gait] : normal gait [Deep Tendon Reflexes (DTR)] : deep tendon reflexes were 2+ and symmetric [Normal Affect] : the affect was normal [Normal Insight/Judgement] : insight and judgment were intact [de-identified] : Slight expiratory wheeze on left lower cleared up with subsequent breaths

## 2023-01-06 NOTE — HISTORY OF PRESENT ILLNESS
[No Pertinent Cardiac History] : no history of aortic stenosis, atrial fibrillation, coronary artery disease, recent myocardial infarction, or implantable device/pacemaker [Asthma] : no asthma [COPD] : no COPD [Sleep Apnea] : no sleep apnea [Smoker] : smoker [No Adverse Anesthesia Reaction] : no adverse anesthesia reaction in self or family member [Chronic Anticoagulation] : no chronic anticoagulation [Chronic Kidney Disease] : no chronic kidney disease [Diabetes] : no diabetes [(Patient denies any chest pain, claudication, dyspnea on exertion, orthopnea, palpitations or syncope)] : Patient denies any chest pain, claudication, dyspnea on exertion, orthopnea, palpitations or syncope [FreeTextEntry1] : rotator cuff repair [FreeTextEntry2] : 1/18/23 [FreeTextEntry3] : Dr Duron [FreeTextEntry4] : 57-year-old female presenting to establish care and for preop work-up.  Patient reports that she does have some concerns today specifically that she had COVID in the end of November 2022 and still having some congestion.  She also suffers from insomnia.  She is past medical history of primary generalized, fibromyalgia, celiac disease and hypothyroid.  Patient is an everyday smoker.\par Last menstrual period was 10 years ago and her last Pap smear was unknown. \par She did have a previous rotator cuff surgery but now wants to have the surgery on the same shoulder again.\par Patient works as an  in the pediatric office

## 2023-01-09 ENCOUNTER — RESULT REVIEW (OUTPATIENT)
Age: 58
End: 2023-01-09

## 2023-01-09 LAB
ALBUMIN SERPL ELPH-MCNC: 4.8 G/DL
ALP BLD-CCNC: 106 U/L
ALT SERPL-CCNC: 22 U/L
ANION GAP SERPL CALC-SCNC: 15 MMOL/L
APPEARANCE: CLEAR
APTT BLD: 27.7 SEC
AST SERPL-CCNC: 18 U/L
BASOPHILS # BLD AUTO: 0.06 K/UL
BASOPHILS NFR BLD AUTO: 0.6 %
BILIRUB SERPL-MCNC: 0.3 MG/DL
BILIRUBIN URINE: NEGATIVE
BLOOD URINE: ABNORMAL
BUN SERPL-MCNC: 23 MG/DL
CALCIUM SERPL-MCNC: 9.6 MG/DL
CHLORIDE SERPL-SCNC: 100 MMOL/L
CO2 SERPL-SCNC: 23 MMOL/L
COLOR: NORMAL
CREAT SERPL-MCNC: 0.73 MG/DL
EGFR: 95 ML/MIN/1.73M2
EOSINOPHIL # BLD AUTO: 0 K/UL
EOSINOPHIL NFR BLD AUTO: 0 %
GLUCOSE QUALITATIVE U: NEGATIVE
GLUCOSE SERPL-MCNC: 153 MG/DL
HCT VFR BLD CALC: 41.3 %
HGB BLD-MCNC: 13.5 G/DL
IMM GRANULOCYTES NFR BLD AUTO: 0.8 %
INR PPP: 0.98 RATIO
KETONES URINE: NEGATIVE
LEUKOCYTE ESTERASE URINE: NEGATIVE
LYMPHOCYTES # BLD AUTO: 0.96 K/UL
LYMPHOCYTES NFR BLD AUTO: 9.3 %
MAN DIFF?: NORMAL
MCHC RBC-ENTMCNC: 31.1 PG
MCHC RBC-ENTMCNC: 32.7 GM/DL
MCV RBC AUTO: 95.2 FL
MONOCYTES # BLD AUTO: 0.33 K/UL
MONOCYTES NFR BLD AUTO: 3.2 %
NEUTROPHILS # BLD AUTO: 8.86 K/UL
NEUTROPHILS NFR BLD AUTO: 86.1 %
NITRITE URINE: NEGATIVE
PH URINE: 6.5
PLATELET # BLD AUTO: 284 K/UL
POTASSIUM SERPL-SCNC: 5 MMOL/L
PROT SERPL-MCNC: 7.4 G/DL
PROTEIN URINE: NEGATIVE
PT BLD: 11.4 SEC
RBC # BLD: 4.34 M/UL
RBC # FLD: 14.2 %
SODIUM SERPL-SCNC: 138 MMOL/L
SPECIFIC GRAVITY URINE: 1.01
UROBILINOGEN URINE: NORMAL
WBC # FLD AUTO: 10.29 K/UL

## 2023-01-13 ENCOUNTER — NON-APPOINTMENT (OUTPATIENT)
Age: 58
End: 2023-01-13

## 2023-01-17 ENCOUNTER — TRANSCRIPTION ENCOUNTER (OUTPATIENT)
Age: 58
End: 2023-01-17

## 2023-01-17 VITALS
HEIGHT: 63 IN | SYSTOLIC BLOOD PRESSURE: 121 MMHG | DIASTOLIC BLOOD PRESSURE: 78 MMHG | RESPIRATION RATE: 16 BRPM | TEMPERATURE: 98 F | OXYGEN SATURATION: 98 % | HEART RATE: 90 BPM | WEIGHT: 181.88 LBS

## 2023-01-17 NOTE — ASU PATIENT PROFILE, ADULT - NSICDXPASTSURGICALHX_GEN_ALL_CORE_FT
PAST SURGICAL HISTORY:  Complete rotator cuff tear left 2017    Elective surgery ankle surgery x 3    H/O laminectomy      PAST SURGICAL HISTORY:  Complete rotator cuff tear left 2017    Elective surgery ankle surgery x 3    H/O laminectomy     H/O spinal fusion

## 2023-01-17 NOTE — PRE-OP CHECKLIST - DNR CLARIFICATION FORM COMPLETED
Take antibiotic as directed  Recommend drinking plenty of fluids including water and cranberry juice  Recommend avoiding caffeine or alcohol  Empty bladder frequently  If you develop any high fever, severe back pain, abdominal pain, nausea, vomiting or any concerning symptoms please return proceed ER    Recommend following up with PCP in 3-5 days
n/a

## 2023-01-18 ENCOUNTER — TRANSCRIPTION ENCOUNTER (OUTPATIENT)
Age: 58
End: 2023-01-18

## 2023-01-18 ENCOUNTER — OUTPATIENT (OUTPATIENT)
Dept: OUTPATIENT SERVICES | Facility: HOSPITAL | Age: 58
LOS: 1 days | Discharge: HOME | End: 2023-01-18
Payer: COMMERCIAL

## 2023-01-18 VITALS
SYSTOLIC BLOOD PRESSURE: 95 MMHG | RESPIRATION RATE: 18 BRPM | TEMPERATURE: 98 F | HEART RATE: 94 BPM | DIASTOLIC BLOOD PRESSURE: 61 MMHG | OXYGEN SATURATION: 96 %

## 2023-01-18 DIAGNOSIS — Z98.890 OTHER SPECIFIED POSTPROCEDURAL STATES: Chronic | ICD-10-CM

## 2023-01-18 DIAGNOSIS — Z98.1 ARTHRODESIS STATUS: Chronic | ICD-10-CM

## 2023-01-18 DIAGNOSIS — M75.120 COMPLETE ROTATOR CUFF TEAR OR RUPTURE OF UNSPECIFIED SHOULDER, NOT SPECIFIED AS TRAUMATIC: Chronic | ICD-10-CM

## 2023-01-18 DIAGNOSIS — Z41.9 ENCOUNTER FOR PROCEDURE FOR PURPOSES OTHER THAN REMEDYING HEALTH STATE, UNSPECIFIED: Chronic | ICD-10-CM

## 2023-01-18 PROCEDURE — C1889: CPT

## 2023-01-18 PROCEDURE — 97165 OT EVAL LOW COMPLEX 30 MIN: CPT

## 2023-01-18 PROCEDURE — C1713: CPT

## 2023-01-18 PROCEDURE — 29826 SHO ARTHRS SRG DECOMPRESSION: CPT | Mod: LT

## 2023-01-18 PROCEDURE — 29823 SHO ARTHRS SRG XTNSV DBRDMT: CPT | Mod: LT

## 2023-01-18 PROCEDURE — 29827 SHO ARTHRS SRG RT8TR CUF RPR: CPT | Mod: LT

## 2023-01-18 DEVICE — ANCHOR SYST OMEGA PEEK KNOTLESS SINGLE 4.75MM: Type: IMPLANTABLE DEVICE | Site: LEFT | Status: FUNCTIONAL

## 2023-01-18 DEVICE — IMP INSPACE BLLN MED: Type: IMPLANTABLE DEVICE | Site: LEFT | Status: FUNCTIONAL

## 2023-01-18 RX ORDER — OXYCODONE AND ACETAMINOPHEN 5; 325 MG/1; MG/1
1 TABLET ORAL
Qty: 20 | Refills: 0
Start: 2023-01-18 | End: 2023-01-22

## 2023-01-18 RX ORDER — LEVOTHYROXINE SODIUM 125 MCG
1 TABLET ORAL
Qty: 0 | Refills: 0 | DISCHARGE

## 2023-01-18 RX ORDER — HYDROMORPHONE HYDROCHLORIDE 2 MG/ML
0.5 INJECTION INTRAMUSCULAR; INTRAVENOUS; SUBCUTANEOUS
Refills: 0 | Status: DISCONTINUED | OUTPATIENT
Start: 2023-01-18 | End: 2023-01-18

## 2023-01-18 NOTE — ASU DISCHARGE PLAN (ADULT/PEDIATRIC) - NS MD DC FALL RISK RISK
For information on Fall & Injury Prevention, visit: [email or web address] OR  [email or web address] OR  https://www.cdc.gov/steadi/patient.html

## 2023-01-18 NOTE — OCCUPATIONAL THERAPY INITIAL EVALUATION ADULT - RANGE OF MOTION EXAMINATION, UPPER EXTREMITY
L UE not tested 2/2 NWB and must maintain shoulder adduction, and pt presents with decrease strength/ sensation in elbow and wrist 2/2 nerve block/Right UE Active ROM was WFL (within functional limits)

## 2023-01-18 NOTE — BRIEF OPERATIVE NOTE - NSICDXBRIEFPROCEDURE_GEN_ALL_CORE_FT
PROCEDURES:  Arthroscopy, shoulder, with revision of rotator cuff repair 18-Jan-2023 12:31:53  Ernie Guillen

## 2023-01-18 NOTE — OCCUPATIONAL THERAPY INITIAL EVALUATION ADULT - MODALITIES TREATMENT COMMENTS
Pt performed functional mobility ind without AD or LOB, pt required assist to don/adjust L UE sling and clothing 2/2 pt with 0 sensation in L hand post sx (team aware) and NWB- pt reports her mother will be at home to assist.

## 2023-01-18 NOTE — ASU DISCHARGE PLAN (ADULT/PEDIATRIC) - CARE PROVIDER_API CALL
Strar Raman)  Orthopaedic Surgery  [address]  [place] [address]  Phone: [phone]  Fax: [phone]  Follow Up Time:

## 2023-01-18 NOTE — OCCUPATIONAL THERAPY INITIAL EVALUATION ADULT - LIVES WITH, PROFILE
Pt will be staying at her parents house. Pt at baseline is ind for ADLs and functional mobility. No DME. Pt is L handed.

## 2023-01-18 NOTE — PRE-ANESTHESIA EVALUATION ADULT - NSANTHOSAYNRD_GEN_A_CORE
No. TIMA screening performed.  STOP BANG Legend: 0-2 = LOW Risk; 3-4 = INTERMEDIATE Risk; 5-8 = HIGH Risk

## 2023-02-09 ENCOUNTER — APPOINTMENT (OUTPATIENT)
Dept: BARIATRICS/WEIGHT MGMT | Facility: CLINIC | Age: 58
End: 2023-02-09
Payer: COMMERCIAL

## 2023-02-09 VITALS
HEART RATE: 110 BPM | SYSTOLIC BLOOD PRESSURE: 133 MMHG | WEIGHT: 192.4 LBS | HEIGHT: 63 IN | BODY MASS INDEX: 34.09 KG/M2 | RESPIRATION RATE: 20 BRPM | DIASTOLIC BLOOD PRESSURE: 77 MMHG | OXYGEN SATURATION: 97 %

## 2023-02-09 PROCEDURE — 99214 OFFICE O/P EST MOD 30 MIN: CPT

## 2023-02-09 NOTE — ASSESSMENT
[FreeTextEntry1] : Educated on the need to continue medication treatment for the disease of obesity long term as research shows that weight gain happens after discontinung GLP1 treatment \par Dietary- focus on lean protein and vegetables and decreasing snack intake \par Exercise- resume when surgically cleared, plans on starting PT next week \par Medication- restarted on wegovy 0.25, plan on having her take this for 2 months at least initially with low dose phentermine to help with fatigue.Wegovy Sample- [id number], Exp: 07-\par RTO in 2 weeks for BP & HR Check. Pt has monitor at home and will monitor as well since there is a risk for inc BP & HR with phenetermine.

## 2023-02-09 NOTE — HISTORY OF PRESENT ILLNESS
[FreeTextEntry1] : 57 y/o female here for medical weight loss consultation\par Concerned that she has gained 50 lbs since having back issues and during this COVID 19 pandemic. Has tried dietary modification, but is struggling with staying motivated and frustrated that she cannot make the right choices with food or exercise at this time. \par C/o low energy\par Has tried phentermine in the past with good success, lowest adult weight 120 lbs, current weight approximately 190 lbs. Denies trying any additional medication for weight loss. \par Medical Hx: fibromyalgia, GERD, Celiac, hypothyroidism, colon polyp, + post menopausal, lumbar radiculopathy\par Surgical Hx: Lumbar lami with fusion 3 months ago- clear to do light exercise at this time (walking, stationary bike), rotator cuff repair, ankle surgery \par Food Recall: B- caramel M&Ms, L-tator tots, cheese, onions, tomatoes, ranch dressing, Snacks- cookies, potato chips.\par Water Intake- inadequate, denies drinking soda or juice, has 1-2 cups of coffee daily with sugar free creamer \par Exercise- denies formal exercise routine at present\par Sleep- 6-7 hours most nights, goes through periods of inadequate sleep, + snoring while on back\par Stress/ Emotional Health- + 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 Taking 0.25 mg SQ weekly, denies side effects. Has been meal prepping for work- brings greek yogurt, hummus with celery, hard boiled eggs. Snacks on popcorn or strawberries, avoiding sweets. Water intake- improved, able to get in 64 ounces some days. Dancing at home for exercise, plans to start walking outside once the weather improves. Labs done on 1/20/22- HgBA1c 6.0%, Cholesterol, LDL & Triglycerides elevated.\par \par 3/22/22: Lost 7 lbs. Taking Wegovy 0.5 mg SQ weekly, + fatigue, +nausea, + emesis 3 times per week after laying down. Continues to eat small portions (strawberries, hummus with rice crackers, greek yogurt, salads), but feels full quickly. Took last dose 2 days ago. Water intake- inadequate. Unable to exercise due to fatigue. Has taken phentermine in the past with good success. \par \par 4/21/22: Lost 3 lbs, down 23 lbs total. Discontinued Wegovy and has been taking Phentermine 15-30 mg daily, tolerating well. Denies fatigue, but able to fall asleep at night. Reports increased cravings and increased episodes of emotional eating since discontinuing Wegovy. Walking outdoors 2 times per week, plans on getting a treadmill and has a goal to walk daily for 30 mins. Would like to re-try Wegovy or GLP1 to help with cravings.  \par \par 5/24/22: Lost 3 lb, down 26 lb total. Tolerating phentermine 15 mg daily & saxenda 2.4 mg SQ daily. Notices some fatigue but tolerating better than Wegovy and feels that the phentermine helps with this. Water intake- sufficient, continues to work on. Walking for exercise 1-3 miles 2 x per week, doesn't like the treadmill. Reports good appetite suppression and portion control. \par \par 7/7/22: Lost 5 lbs, down 31 lbs total. Tolerating phentermine 15 mg & saxenda daily. Noticing some fatigue and increased cravings in the afternoon. Walks for exercise when she has energy, but often feels tired in the afternoon. Reviewed recent lab results. \par \par 8/11/22: Lost 1 lb, 32 lbs total. Tolerating phentermine 30 mg & saxenda 3 mg Daily. Reports increased cravings and hunger while at work. Walks for exercise- would like to join [employer] if one opens locally. Interested in trying Wegovy instead of saxenda to see if it helps with hunger. Tries to focus on eating healthy food throughout the day, but struggles at work. \par \par 9/19/22: Lost 2 lbs, 35 lbs total. Tolerating phentermine 30 mg & saxenda 3 mg daily. Walking for exercise. Eating small portions and healthier meals.Cut back on smoking, reports that smoking is more social. \par \par 2/9/23: Gained 37 lbs since we last saw each other, discontinued all medical weight management medications and has been struggling with cravings and hunger throughout the day. Physical activity limited due to recent Left arm rotator cuff surgery. Upset and frustrated with herself plus struggling with fatigue at work. WOuld like to restart medication treatment at this time  minimum assist (75% patients effort) contact guard

## 2023-02-15 ENCOUNTER — APPOINTMENT (OUTPATIENT)
Dept: NEUROLOGY | Facility: CLINIC | Age: 58
End: 2023-02-15

## 2023-02-27 ENCOUNTER — APPOINTMENT (OUTPATIENT)
Dept: RHEUMATOLOGY | Facility: CLINIC | Age: 58
End: 2023-02-27
Payer: COMMERCIAL

## 2023-02-27 PROCEDURE — 96372 THER/PROPH/DIAG INJ SC/IM: CPT

## 2023-02-27 RX ORDER — METHYLPRED ACET/NACL,ISO-OS/PF 80 MG/ML
80 VIAL (ML) INJECTION
Qty: 1 | Refills: 0 | Status: COMPLETED | OUTPATIENT
Start: 2023-02-27

## 2023-02-27 RX ADMIN — KETOROLAC TROMETHAMINE 2 MG/2ML: 60 INJECTION, SOLUTION INTRAMUSCULAR at 00:00

## 2023-02-27 RX ADMIN — METHYLPREDNISOLONE ACETATE 0 MG/ML: 80 INJECTION, SUSPENSION INTRA-ARTICULAR; INTRALESIONAL; INTRAMUSCULAR; SOFT TISSUE at 00:00

## 2023-03-01 ENCOUNTER — APPOINTMENT (OUTPATIENT)
Dept: BARIATRICS/WEIGHT MGMT | Facility: CLINIC | Age: 58
End: 2023-03-01

## 2023-03-02 ENCOUNTER — APPOINTMENT (OUTPATIENT)
Dept: BARIATRICS/WEIGHT MGMT | Facility: CLINIC | Age: 58
End: 2023-03-02
Payer: COMMERCIAL

## 2023-03-02 VITALS
HEART RATE: 105 BPM | DIASTOLIC BLOOD PRESSURE: 75 MMHG | SYSTOLIC BLOOD PRESSURE: 110 MMHG | BODY MASS INDEX: 32.25 KG/M2 | WEIGHT: 182 LBS | HEIGHT: 63 IN

## 2023-03-02 PROCEDURE — 99214 OFFICE O/P EST MOD 30 MIN: CPT

## 2023-03-02 NOTE — HISTORY OF PRESENT ILLNESS
[FreeTextEntry1] : 55 y/o female here for medical weight loss consultation\par Concerned that she has gained 50 lbs since having back issues and during this COVID 19 pandemic. Has tried dietary modification, but is struggling with staying motivated and frustrated that she cannot make the right choices with food or exercise at this time. \par C/o low energy\par Has tried phentermine in the past with good success, lowest adult weight 120 lbs, current weight approximately 190 lbs. Denies trying any additional medication for weight loss. \par Medical Hx: fibromyalgia, GERD, Celiac, hypothyroidism, colon polyp, + post menopausal, lumbar radiculopathy\par Surgical Hx: Lumbar lami with fusion 3 months ago- clear to do light exercise at this time (walking, stationary bike), rotator cuff repair, ankle surgery \par Food Recall: B- caramel M&Ms, L-tator tots, cheese, onions, tomatoes, ranch dressing, Snacks- cookies, potato chips.\par Water Intake- inadequate, denies drinking soda or juice, has 1-2 cups of coffee daily with sugar free creamer \par Exercise- denies formal exercise routine at present\par Sleep- 6-7 hours most nights, goes through periods of inadequate sleep, + snoring while on back\par Stress/ Emotional Health- + 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 Taking 0.25 mg SQ weekly, denies side effects. Has been meal prepping for work- brings greek yogurt, hummus with celery, hard boiled eggs. Snacks on popcorn or strawberries, avoiding sweets. Water intake- improved, able to get in 64 ounces some days. Dancing at home for exercise, plans to start walking outside once the weather improves. Labs done on 1/20/22- HgBA1c 6.0%, Cholesterol, LDL & Triglycerides elevated.\par \par 3/22/22: Lost 7 lbs. Taking Wegovy 0.5 mg SQ weekly, + fatigue, +nausea, + emesis 3 times per week after laying down. Continues to eat small portions (strawberries, hummus with rice crackers, greek yogurt, salads), but feels full quickly. Took last dose 2 days ago. Water intake- inadequate. Unable to exercise due to fatigue. Has taken phentermine in the past with good success. \par \par 4/21/22: Lost 3 lbs, down 23 lbs total. Discontinued Wegovy and has been taking Phentermine 15-30 mg daily, tolerating well. Denies fatigue, but able to fall asleep at night. Reports increased cravings and increased episodes of emotional eating since discontinuing Wegovy. Walking outdoors 2 times per week, plans on getting a treadmill and has a goal to walk daily for 30 mins. Would like to re-try Wegovy or GLP1 to help with cravings.  \par \par 5/24/22: Lost 3 lb, down 26 lb total. Tolerating phentermine 15 mg daily & saxenda 2.4 mg SQ daily. Notices some fatigue but tolerating better than Wegovy and feels that the phentermine helps with this. Water intake- sufficient, continues to work on. Walking for exercise 1-3 miles 2 x per week, doesn't like the treadmill. Reports good appetite suppression and portion control. \par \par 7/7/22: Lost 5 lbs, down 31 lbs total. Tolerating phentermine 15 mg & saxenda daily. Noticing some fatigue and increased cravings in the afternoon. Walks for exercise when she has energy, but often feels tired in the afternoon. Reviewed recent lab results. \par \par 8/11/22: Lost 1 lb, 32 lbs total. Tolerating phentermine 30 mg & saxenda 3 mg Daily. Reports increased cravings and hunger while at work. Walks for exercise- would like to join [employer] if one opens locally. Interested in trying Wegovy instead of saxenda to see if it helps with hunger. Tries to focus on eating healthy food throughout the day, but struggles at work. \par \par 9/19/22: Lost 2 lbs, 35 lbs total. Tolerating phentermine 30 mg & saxenda 3 mg daily. Walking for exercise. Eating small portions and healthier meals.Cut back on smoking, reports that smoking is more social. \par \par 2/9/23: Gained 37 lbs since we last saw each other, discontinued all medical weight management medications and has been struggling with cravings and hunger throughout the day. Physical activity limited due to recent Left arm rotator cuff surgery. Upset and frustrated with herself plus struggling with fatigue at work. WOuld like to restart medication treatment at this time\par \par 3/2/23: Feeling better, lost 10 lbs. Tolerating medication, denies side effects but continues to feel tired throughout the day. Eating healthier dietary options and has been working on exercises post shoulder surgey

## 2023-03-02 NOTE — ASSESSMENT
[FreeTextEntry1] : Dietary- focus on lean protein and vegetables and decreasing snack intake \par Exercise- resume when surgically cleared, continue with PT \par Medication- Continue wegovy 0.25 mg weekly, inc dose of phentermine to 15 mg daily\par RTO in 4 weeks

## 2023-03-13 ENCOUNTER — TRANSCRIPTION ENCOUNTER (OUTPATIENT)
Age: 58
End: 2023-03-13

## 2023-03-28 ENCOUNTER — APPOINTMENT (OUTPATIENT)
Dept: BARIATRICS/WEIGHT MGMT | Facility: CLINIC | Age: 58
End: 2023-03-28
Payer: COMMERCIAL

## 2023-03-28 VITALS
WEIGHT: 179 LBS | SYSTOLIC BLOOD PRESSURE: 112 MMHG | BODY MASS INDEX: 31.71 KG/M2 | OXYGEN SATURATION: 98 % | HEART RATE: 93 BPM | DIASTOLIC BLOOD PRESSURE: 82 MMHG | HEIGHT: 63 IN | RESPIRATION RATE: 18 BRPM

## 2023-03-28 VITALS — BODY MASS INDEX: 31.71 KG/M2 | HEIGHT: 63 IN | WEIGHT: 179 LBS

## 2023-03-28 PROCEDURE — 99213 OFFICE O/P EST LOW 20 MIN: CPT

## 2023-03-28 NOTE — ASSESSMENT
[FreeTextEntry1] : Dietary- focus on lean protein and vegetables and eating healthy snacks when hungry \par Exercise- continue PT \par Medication- inc dose of wegovy to 1 mg weekly, can continue with Phentermine as she is tolerating this regimen well \par RTO in 1 month

## 2023-03-28 NOTE — HISTORY OF PRESENT ILLNESS
[FreeTextEntry1] : 57 y/o female here for medical weight loss consultation\par Concerned that she has gained 50 lbs since having back issues and during this COVID 19 pandemic. Has tried dietary modification, but is struggling with staying motivated and frustrated that she cannot make the right choices with food or exercise at this time. \par C/o low energy\par Has tried phentermine in the past with good success, lowest adult weight 120 lbs, current weight approximately 190 lbs. Denies trying any additional medication for weight loss. \par Medical Hx: fibromyalgia, GERD, Celiac, hypothyroidism, colon polyp, + post menopausal, lumbar radiculopathy\par Surgical Hx: Lumbar lami with fusion 3 months ago- clear to do light exercise at this time (walking, stationary bike), rotator cuff repair, ankle surgery \par Food Recall: B- caramel M&Ms, L-tator tots, cheese, onions, tomatoes, ranch dressing, Snacks- cookies, potato chips.\par Water Intake- inadequate, denies drinking soda or juice, has 1-2 cups of coffee daily with sugar free creamer \par Exercise- denies formal exercise routine at present\par Sleep- 6-7 hours most nights, goes through periods of inadequate sleep, + snoring while on back\par Stress/ Emotional Health- + 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 Taking 0.25 mg SQ weekly, denies side effects. Has been meal prepping for work- brings greek yogurt, hummus with celery, hard boiled eggs. Snacks on popcorn or strawberries, avoiding sweets. Water intake- improved, able to get in 64 ounces some days. Dancing at home for exercise, plans to start walking outside once the weather improves. Labs done on 1/20/22- HgBA1c 6.0%, Cholesterol, LDL & Triglycerides elevated.\par \par 3/22/22: Lost 7 lbs. Taking Wegovy 0.5 mg SQ weekly, + fatigue, +nausea, + emesis 3 times per week after laying down. Continues to eat small portions (strawberries, hummus with rice crackers, greek yogurt, salads), but feels full quickly. Took last dose 2 days ago. Water intake- inadequate. Unable to exercise due to fatigue. Has taken phentermine in the past with good success. \par \par 4/21/22: Lost 3 lbs, down 23 lbs total. Discontinued Wegovy and has been taking Phentermine 15-30 mg daily, tolerating well. Denies fatigue, but able to fall asleep at night. Reports increased cravings and increased episodes of emotional eating since discontinuing Wegovy. Walking outdoors 2 times per week, plans on getting a treadmill and has a goal to walk daily for 30 mins. Would like to re-try Wegovy or GLP1 to help with cravings.  \par \par 5/24/22: Lost 3 lb, down 26 lb total. Tolerating phentermine 15 mg daily & saxenda 2.4 mg SQ daily. Notices some fatigue but tolerating better than Wegovy and feels that the phentermine helps with this. Water intake- sufficient, continues to work on. Walking for exercise 1-3 miles 2 x per week, doesn't like the treadmill. Reports good appetite suppression and portion control. \par \par 7/7/22: Lost 5 lbs, down 31 lbs total. Tolerating phentermine 15 mg & saxenda daily. Noticing some fatigue and increased cravings in the afternoon. Walks for exercise when she has energy, but often feels tired in the afternoon. Reviewed recent lab results. \par \par 8/11/22: Lost 1 lb, 32 lbs total. Tolerating phentermine 30 mg & saxenda 3 mg Daily. Reports increased cravings and hunger while at work. Walks for exercise- would like to join [employer] if one opens locally. Interested in trying Wegovy instead of saxenda to see if it helps with hunger. Tries to focus on eating healthy food throughout the day, but struggles at work. \par \par 9/19/22: Lost 2 lbs, 35 lbs total. Tolerating phentermine 30 mg & saxenda 3 mg daily. Walking for exercise. Eating small portions and healthier meals.Cut back on smoking, reports that smoking is more social. \par \par 2/9/23: Gained 37 lbs since we last saw each other, discontinued all medical weight management medications and has been struggling with cravings and hunger throughout the day. Physical activity limited due to recent Left arm rotator cuff surgery. Upset and frustrated with herself plus struggling with fatigue at work. WOuld like to restart medication treatment at this time\par \par 3/2/23: Feeling better, lost 10 lbs. Tolerating medication, denies side effects but continues to feel tired throughout the day. Eating healthier dietary options and has been working on exercises post shoulder surgery \par \par 3/28/23: Lost 3 lbs, down 13 lbs total. Tolerating wegovy .5 mg weekly, has some constipation. Continues to struggle with cravings/ hunger so taking phentermine as well to help with hunger and fatigue. Continues to do PT for shoulder. Removed all unhealthy snacks from house and eating healthier meals throughout the day.

## 2023-04-04 ENCOUNTER — APPOINTMENT (OUTPATIENT)
Dept: RHEUMATOLOGY | Facility: CLINIC | Age: 58
End: 2023-04-04
Payer: COMMERCIAL

## 2023-04-04 PROCEDURE — 96372 THER/PROPH/DIAG INJ SC/IM: CPT

## 2023-04-04 RX ADMIN — KETOROLAC TROMETHAMINE 2 MG/ML: 30 INJECTION, SOLUTION INTRAMUSCULAR; INTRAVENOUS at 00:00

## 2023-04-04 RX ADMIN — METHYLPREDNISOLONE ACETATE 0 MG/ML: 80 INJECTION, SUSPENSION INTRA-ARTICULAR; INTRALESIONAL; INTRAMUSCULAR; SOFT TISSUE at 00:00

## 2023-04-05 RX ORDER — KETOROLAC TROMETHAMINE 30 MG/ML
30 INJECTION, SOLUTION INTRAMUSCULAR; INTRAVENOUS
Qty: 1 | Refills: 0 | Status: COMPLETED | OUTPATIENT
Start: 2023-04-04

## 2023-04-05 RX ORDER — METHYLPRED ACET/NACL,ISO-OS/PF 80 MG/ML
80 VIAL (ML) INJECTION
Qty: 1 | Refills: 0 | Status: COMPLETED | OUTPATIENT
Start: 2023-04-04

## 2023-04-12 ENCOUNTER — RX RENEWAL (OUTPATIENT)
Age: 58
End: 2023-04-12

## 2023-04-19 ENCOUNTER — RX RENEWAL (OUTPATIENT)
Age: 58
End: 2023-04-19

## 2023-04-21 ENCOUNTER — RX RENEWAL (OUTPATIENT)
Age: 58
End: 2023-04-21

## 2023-04-25 ENCOUNTER — APPOINTMENT (OUTPATIENT)
Dept: BARIATRICS/WEIGHT MGMT | Facility: CLINIC | Age: 58
End: 2023-04-25
Payer: COMMERCIAL

## 2023-04-25 VITALS
SYSTOLIC BLOOD PRESSURE: 115 MMHG | WEIGHT: 171 LBS | OXYGEN SATURATION: 99 % | BODY MASS INDEX: 30.29 KG/M2 | DIASTOLIC BLOOD PRESSURE: 75 MMHG

## 2023-04-25 VITALS — HEART RATE: 100 BPM | HEIGHT: 63 IN

## 2023-04-25 PROCEDURE — 99214 OFFICE O/P EST MOD 30 MIN: CPT

## 2023-04-25 NOTE — HISTORY OF PRESENT ILLNESS
[FreeTextEntry1] : 57 y/o female here for medical weight loss consultation\par Concerned that she has gained 50 lbs since having back issues and during this COVID 19 pandemic. Has tried dietary modification, but is struggling with staying motivated and frustrated that she cannot make the right choices with food or exercise at this time. \par C/o low energy\par Has tried phentermine in the past with good success, lowest adult weight 120 lbs, current weight approximately 190 lbs. Denies trying any additional medication for weight loss. \par Medical Hx: fibromyalgia, GERD, Celiac, hypothyroidism, colon polyp, + post menopausal, lumbar radiculopathy\par Surgical Hx: Lumbar lami with fusion 3 months ago- clear to do light exercise at this time (walking, stationary bike), rotator cuff repair, ankle surgery \par Food Recall: B- caramel M&Ms, L-tator tots, cheese, onions, tomatoes, ranch dressing, Snacks- cookies, potato chips.\par Water Intake- inadequate, denies drinking soda or juice, has 1-2 cups of coffee daily with sugar free creamer \par Exercise- denies formal exercise routine at present\par Sleep- 6-7 hours most nights, goes through periods of inadequate sleep, + snoring while on back\par Stress/ Emotional Health- + 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 Taking 0.25 mg SQ weekly, denies side effects. Has been meal prepping for work- brings greek yogurt, hummus with celery, hard boiled eggs. Snacks on popcorn or strawberries, avoiding sweets. Water intake- improved, able to get in 64 ounces some days. Dancing at home for exercise, plans to start walking outside once the weather improves. Labs done on 1/20/22- HgBA1c 6.0%, Cholesterol, LDL & Triglycerides elevated.\par \par 3/22/22: Lost 7 lbs. Taking Wegovy 0.5 mg SQ weekly, + fatigue, +nausea, + emesis 3 times per week after laying down. Continues to eat small portions (strawberries, hummus with rice crackers, greek yogurt, salads), but feels full quickly. Took last dose 2 days ago. Water intake- inadequate. Unable to exercise due to fatigue. Has taken phentermine in the past with good success. \par \par 4/21/22: Lost 3 lbs, down 23 lbs total. Discontinued Wegovy and has been taking Phentermine 15-30 mg daily, tolerating well. Denies fatigue, but able to fall asleep at night. Reports increased cravings and increased episodes of emotional eating since discontinuing Wegovy. Walking outdoors 2 times per week, plans on getting a treadmill and has a goal to walk daily for 30 mins. Would like to re-try Wegovy or GLP1 to help with cravings.  \par \par 5/24/22: Lost 3 lb, down 26 lb total. Tolerating phentermine 15 mg daily & saxenda 2.4 mg SQ daily. Notices some fatigue but tolerating better than Wegovy and feels that the phentermine helps with this. Water intake- sufficient, continues to work on. Walking for exercise 1-3 miles 2 x per week, doesn't like the treadmill. Reports good appetite suppression and portion control. \par \par 7/7/22: Lost 5 lbs, down 31 lbs total. Tolerating phentermine 15 mg & saxenda daily. Noticing some fatigue and increased cravings in the afternoon. Walks for exercise when she has energy, but often feels tired in the afternoon. Reviewed recent lab results. \par \par 8/11/22: Lost 1 lb, 32 lbs total. Tolerating phentermine 30 mg & saxenda 3 mg Daily. Reports increased cravings and hunger while at work. Walks for exercise- would like to join [employer] if one opens locally. Interested in trying Wegovy instead of saxenda to see if it helps with hunger. Tries to focus on eating healthy food throughout the day, but struggles at work. \par \par 9/19/22: Lost 2 lbs, 35 lbs total. Tolerating phentermine 30 mg & saxenda 3 mg daily. Walking for exercise. Eating small portions and healthier meals.Cut back on smoking, reports that smoking is more social. \par \par 2/9/23: Gained 37 lbs since we last saw each other, discontinued all medical weight management medications and has been struggling with cravings and hunger throughout the day. Physical activity limited due to recent Left arm rotator cuff surgery. Upset and frustrated with herself plus struggling with fatigue at work. WOuld like to restart medication treatment at this time\par \par 3/2/23: Feeling better, lost 10 lbs. Tolerating medication, denies side effects but continues to feel tired throughout the day. Eating healthier dietary options and has been working on exercises post shoulder surgery \par \par 3/28/23: Lost 3 lbs, down 13 lbs total. Tolerating wegovy .5 mg weekly, has some constipation. Continues to struggle with cravings/ hunger so taking phentermine as well to help with hunger and fatigue. Continues to do PT for shoulder. Removed all unhealthy snacks from house and eating healthier meals throughout the day.\par \par 4/25/23: Down 8 lbs, lost 21 lbs total since starting back on treatment plan. Continues to walk 15-20 mins most days and does PT 2 x per week. Tolerating wegovy 1 mg weekly & phentermine 23 mg daily. Feels nauseous if she overeats but otherwise denies side effects. Eating healthy diet for the most part. Had [facility] abdominal treatment done, which she felt was helpful. Working on decreasing smoking- down to 3 cigarrettes per day. Continues to struggle with high stress level related to work.

## 2023-04-25 NOTE — ASSESSMENT
[FreeTextEntry1] : Dietary- focus on lean protein and vegetables. Try to avoid overeating to help with side effects \par Exercise- continue PT and walking, discussed the importance of continuing strength training \par Medication-inc dose of wegovy to 1.7 mg weekly and phentermine to 30 mg daily \par RTO in 1 month for f/u and BP & HR check

## 2023-05-05 ENCOUNTER — APPOINTMENT (OUTPATIENT)
Dept: NEUROLOGY | Facility: CLINIC | Age: 58
End: 2023-05-05

## 2023-05-16 ENCOUNTER — APPOINTMENT (OUTPATIENT)
Dept: RHEUMATOLOGY | Facility: CLINIC | Age: 58
End: 2023-05-16
Payer: COMMERCIAL

## 2023-05-16 PROCEDURE — 96372 THER/PROPH/DIAG INJ SC/IM: CPT

## 2023-05-16 RX ORDER — KETOROLAC TROMETHAMINE 30 MG/ML
30 INJECTION, SOLUTION INTRAMUSCULAR; INTRAVENOUS
Qty: 1 | Refills: 0 | Status: COMPLETED | OUTPATIENT
Start: 2023-05-16

## 2023-05-16 RX ORDER — METHYLPRED ACET/NACL,ISO-OS/PF 80 MG/ML
80 VIAL (ML) INJECTION
Qty: 1 | Refills: 0 | Status: COMPLETED | OUTPATIENT
Start: 2023-05-16

## 2023-05-16 RX ADMIN — Medication 0 MG/ML: at 00:00

## 2023-05-16 RX ADMIN — KETOROLAC TROMETHAMINE 2 MG/ML: 30 INJECTION, SOLUTION INTRAMUSCULAR; INTRAVENOUS at 00:00

## 2023-05-25 ENCOUNTER — APPOINTMENT (OUTPATIENT)
Dept: BARIATRICS/WEIGHT MGMT | Facility: CLINIC | Age: 58
End: 2023-05-25
Payer: COMMERCIAL

## 2023-05-25 VITALS — HEIGHT: 63 IN | WEIGHT: 166 LBS | BODY MASS INDEX: 29.41 KG/M2

## 2023-05-25 PROCEDURE — 99213 OFFICE O/P EST LOW 20 MIN: CPT

## 2023-05-25 NOTE — ASSESSMENT
[FreeTextEntry1] : Dietary- rec increasing water intake daily. Daily rec of 64 ounces of water and 60 grams of protein. \par Med- inc dose of wegovy to 2.4 weekly and continue phentermine 30 as tolerating well \par Labs- ordered f/u labs to be done before our next apt \par RTO in 1 month

## 2023-05-25 NOTE — HISTORY OF PRESENT ILLNESS
[FreeTextEntry1] : 55 y/o female here for medical weight loss consultation\par Concerned that she has gained 50 lbs since having back issues and during this COVID 19 pandemic. Has tried dietary modification, but is struggling with staying motivated and frustrated that she cannot make the right choices with food or exercise at this time. \par C/o low energy\par Has tried phentermine in the past with good success, lowest adult weight 120 lbs, current weight approximately 190 lbs. Denies trying any additional medication for weight loss. \par Medical Hx: fibromyalgia, GERD, Celiac, hypothyroidism, colon polyp, + post menopausal, lumbar radiculopathy\par Surgical Hx: Lumbar lami with fusion 3 months ago- clear to do light exercise at this time (walking, stationary bike), rotator cuff repair, ankle surgery \par Food Recall: B- caramel M&Ms, L-tator tots, cheese, onions, tomatoes, ranch dressing, Snacks- cookies, potato chips.\par Water Intake- inadequate, denies drinking soda or juice, has 1-2 cups of coffee daily with sugar free creamer \par Exercise- denies formal exercise routine at present\par Sleep- 6-7 hours most nights, goes through periods of inadequate sleep, + snoring while on back\par Stress/ Emotional Health- + 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 Taking 0.25 mg SQ weekly, denies side effects. Has been meal prepping for work- brings greek yogurt, hummus with celery, hard boiled eggs. Snacks on popcorn or strawberries, avoiding sweets. Water intake- improved, able to get in 64 ounces some days. Dancing at home for exercise, plans to start walking outside once the weather improves. Labs done on 1/20/22- HgBA1c 6.0%, Cholesterol, LDL & Triglycerides elevated.\par \par 3/22/22: Lost 7 lbs. Taking Wegovy 0.5 mg SQ weekly, + fatigue, +nausea, + emesis 3 times per week after laying down. Continues to eat small portions (strawberries, hummus with rice crackers, greek yogurt, salads), but feels full quickly. Took last dose 2 days ago. Water intake- inadequate. Unable to exercise due to fatigue. Has taken phentermine in the past with good success. \par \par 4/21/22: Lost 3 lbs, down 23 lbs total. Discontinued Wegovy and has been taking Phentermine 15-30 mg daily, tolerating well. Denies fatigue, but able to fall asleep at night. Reports increased cravings and increased episodes of emotional eating since discontinuing Wegovy. Walking outdoors 2 times per week, plans on getting a treadmill and has a goal to walk daily for 30 mins. Would like to re-try Wegovy or GLP1 to help with cravings.  \par \par 5/24/22: Lost 3 lb, down 26 lb total. Tolerating phentermine 15 mg daily & saxenda 2.4 mg SQ daily. Notices some fatigue but tolerating better than Wegovy and feels that the phentermine helps with this. Water intake- sufficient, continues to work on. Walking for exercise 1-3 miles 2 x per week, doesn't like the treadmill. Reports good appetite suppression and portion control. \par \par 7/7/22: Lost 5 lbs, down 31 lbs total. Tolerating phentermine 15 mg & saxenda daily. Noticing some fatigue and increased cravings in the afternoon. Walks for exercise when she has energy, but often feels tired in the afternoon. Reviewed recent lab results. \par \par 8/11/22: Lost 1 lb, 32 lbs total. Tolerating phentermine 30 mg & saxenda 3 mg Daily. Reports increased cravings and hunger while at work. Walks for exercise- would like to join [employer] if one opens locally. Interested in trying Wegovy instead of saxenda to see if it helps with hunger. Tries to focus on eating healthy food throughout the day, but struggles at work. \par \par 9/19/22: Lost 2 lbs, 35 lbs total. Tolerating phentermine 30 mg & saxenda 3 mg daily. Walking for exercise. Eating small portions and healthier meals.Cut back on smoking, reports that smoking is more social. \par \par 2/9/23: Gained 37 lbs since we last saw each other, discontinued all medical weight management medications and has been struggling with cravings and hunger throughout the day. Physical activity limited due to recent Left arm rotator cuff surgery. Upset and frustrated with herself plus struggling with fatigue at work. WOuld like to restart medication treatment at this time\par \par 3/2/23: Feeling better, lost 10 lbs. Tolerating medication, denies side effects but continues to feel tired throughout the day. Eating healthier dietary options and has been working on exercises post shoulder surgery \par \par 3/28/23: Lost 3 lbs, down 13 lbs total. Tolerating wegovy .5 mg weekly, has some constipation. Continues to struggle with cravings/ hunger so taking phentermine as well to help with hunger and fatigue. Continues to do PT for shoulder. Removed all unhealthy snacks from house and eating healthier meals throughout the day.\par \par 4/25/23: Down 8 lbs, lost 21 lbs total since starting back on treatment plan. Continues to walk 15-20 mins most days and does PT 2 x per week. Tolerating wegovy 1 mg weekly & phentermine 23 mg daily. Feels nauseous if she overeats but otherwise denies side effects. Eating healthy diet for the most part. Had [facility]-series abdominal treatment done, which she felt was helpful. Working on decreasing smoking- down to 3 cigarrettes per day. Continues to struggle with high stress level related to work. \par \par 5/25/23: Down 26 lbs total. Sleeping better, feels well overall. good energy. Eating 2 meals per day with protein and vegetables. Walking for exercise.

## 2023-06-21 ENCOUNTER — APPOINTMENT (OUTPATIENT)
Dept: BARIATRICS/WEIGHT MGMT | Facility: CLINIC | Age: 58
End: 2023-06-21
Payer: COMMERCIAL

## 2023-06-21 VITALS
HEART RATE: 97 BPM | HEIGHT: 63 IN | BODY MASS INDEX: 28.37 KG/M2 | WEIGHT: 160.1 LBS | DIASTOLIC BLOOD PRESSURE: 78 MMHG | SYSTOLIC BLOOD PRESSURE: 122 MMHG

## 2023-06-21 PROCEDURE — 99214 OFFICE O/P EST MOD 30 MIN: CPT | Mod: 95

## 2023-06-21 NOTE — ASSESSMENT
[FreeTextEntry1] : Sent Rx renewal for wegovy 2.4 mg to [facility], continue phetermine as well \par Dietary- focus on lean protein and vegetables and increasing water intake \par Exercise- continue to walk \par Labs- to be done tomorrow fasting

## 2023-06-21 NOTE — HISTORY OF PRESENT ILLNESS
[Other Location: e.g. School (Enter Location, City,State)___] : at [unfilled], at the time of the visit. [Other Location: e.g. Home (Enter Location, City,State)___] : at [unfilled] [Verbal consent obtained from patient] : the patient, [unfilled] [FreeTextEntry1] : 57 y/o female here for medical weight loss consultation\par Concerned that she has gained 50 lbs since having back issues and during this COVID 19 pandemic. Has tried dietary modification, but is struggling with staying motivated and frustrated that she cannot make the right choices with food or exercise at this time. \par C/o low energy\par Has tried phentermine in the past with good success, lowest adult weight 120 lbs, current weight approximately 190 lbs. Denies trying any additional medication for weight loss. \par Medical Hx: fibromyalgia, GERD, Celiac, hypothyroidism, colon polyp, + post menopausal, lumbar radiculopathy\par Surgical Hx: Lumbar lami with fusion 3 months ago- clear to do light exercise at this time (walking, stationary bike), rotator cuff repair, ankle surgery \par Food Recall: B- caramel M&Ms, L-tator tots, cheese, onions, tomatoes, ranch dressing, Snacks- cookies, potato chips.\par Water Intake- inadequate, denies drinking soda or juice, has 1-2 cups of coffee daily with sugar free creamer \par Exercise- denies formal exercise routine at present\par Sleep- 6-7 hours most nights, goes through periods of inadequate sleep, + snoring while on back\par Stress/ Emotional Health- + 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 Taking 0.25 mg SQ weekly, denies side effects. Has been meal prepping for work- brings greek yogurt, hummus with celery, hard boiled eggs. Snacks on popcorn or strawberries, avoiding sweets. Water intake- improved, able to get in 64 ounces some days. Dancing at home for exercise, plans to start walking outside once the weather improves. Labs done on 1/20/22- HgBA1c 6.0%, Cholesterol, LDL & Triglycerides elevated.\par \par 3/22/22: Lost 7 lbs. Taking Wegovy 0.5 mg SQ weekly, + fatigue, +nausea, + emesis 3 times per week after laying down. Continues to eat small portions (strawberries, hummus with rice crackers, greek yogurt, salads), but feels full quickly. Took last dose 2 days ago. Water intake- inadequate. Unable to exercise due to fatigue. Has taken phentermine in the past with good success. \par \par 4/21/22: Lost 3 lbs, down 23 lbs total. Discontinued Wegovy and has been taking Phentermine 15-30 mg daily, tolerating well. Denies fatigue, but able to fall asleep at night. Reports increased cravings and increased episodes of emotional eating since discontinuing Wegovy. Walking outdoors 2 times per week, plans on getting a treadmill and has a goal to walk daily for 30 mins. Would like to re-try Wegovy or GLP1 to help with cravings.  \par \par 5/24/22: Lost 3 lb, down 26 lb total. Tolerating phentermine 15 mg daily & saxenda 2.4 mg SQ daily. Notices some fatigue but tolerating better than Wegovy and feels that the phentermine helps with this. Water intake- sufficient, continues to work on. Walking for exercise 1-3 miles 2 x per week, doesn't like the treadmill. Reports good appetite suppression and portion control. \par \par 7/7/22: Lost 5 lbs, down 31 lbs total. Tolerating phentermine 15 mg & saxenda daily. Noticing some fatigue and increased cravings in the afternoon. Walks for exercise when she has energy, but often feels tired in the afternoon. Reviewed recent lab results. \par \par 8/11/22: Lost 1 lb, 32 lbs total. Tolerating phentermine 30 mg & saxenda 3 mg Daily. Reports increased cravings and hunger while at work. Walks for exercise- would like to join [employer] if one opens locally. Interested in trying Wegovy instead of saxenda to see if it helps with hunger. Tries to focus on eating healthy food throughout the day, but struggles at work. \par \par 9/19/22: Lost 2 lbs, 35 lbs total. Tolerating phentermine 30 mg & saxenda 3 mg daily. Walking for exercise. Eating small portions and healthier meals.Cut back on smoking, reports that smoking is more social. \par \par 2/9/23: Gained 37 lbs since we last saw each other, discontinued all medical weight management medications and has been struggling with cravings and hunger throughout the day. Physical activity limited due to recent Left arm rotator cuff surgery. Upset and frustrated with herself plus struggling with fatigue at work. WOuld like to restart medication treatment at this time\par \par 3/2/23: Feeling better, lost 10 lbs. Tolerating medication, denies side effects but continues to feel tired throughout the day. Eating healthier dietary options and has been working on exercises post shoulder surgery \par \par 3/28/23: Lost 3 lbs, down 13 lbs total. Tolerating wegovy .5 mg weekly, has some constipation. Continues to struggle with cravings/ hunger so taking phentermine as well to help with hunger and fatigue. Continues to do PT for shoulder. Removed all unhealthy snacks from house and eating healthier meals throughout the day.\par \par 4/25/23: Down 8 lbs, lost 21 lbs total since starting back on treatment plan. Continues to walk 15-20 mins most days and does PT 2 x per week. Tolerating wegovy 1 mg weekly & phentermine 23 mg daily. Feels nauseous if she overeats but otherwise denies side effects. Eating healthy diet for the most part. Had [facility]-series abdominal treatment done, which she felt was helpful. Working on decreasing smoking- down to 3 cigarrettes per day. Continues to struggle with high stress level related to work. \par \par 5/25/23: Down 26 lbs total. Sleeping better, feels well overall. good energy. Eating 2 meals per day with protein and vegetables. Walking for exercise. \par \par 6/21/23: Down 32 lbs total. Continues to walk for exercise, sleeping well. Eating better overall. Gets tired, but feels that phentermine helps with fatigue. Checked BP and pulse at the office earlier today.

## 2023-07-03 ENCOUNTER — RX RENEWAL (OUTPATIENT)
Age: 58
End: 2023-07-03

## 2023-07-12 ENCOUNTER — APPOINTMENT (OUTPATIENT)
Dept: RHEUMATOLOGY | Facility: CLINIC | Age: 58
End: 2023-07-12
Payer: COMMERCIAL

## 2023-07-12 VITALS
OXYGEN SATURATION: 97 % | DIASTOLIC BLOOD PRESSURE: 80 MMHG | BODY MASS INDEX: 28.17 KG/M2 | SYSTOLIC BLOOD PRESSURE: 120 MMHG | WEIGHT: 159 LBS | HEART RATE: 108 BPM

## 2023-07-12 PROCEDURE — 96372 THER/PROPH/DIAG INJ SC/IM: CPT

## 2023-07-12 PROCEDURE — 99214 OFFICE O/P EST MOD 30 MIN: CPT | Mod: 25

## 2023-07-12 RX ORDER — KETOROLAC TROMETHAMINE 30 MG/ML
30 INJECTION, SOLUTION INTRAMUSCULAR; INTRAVENOUS
Qty: 1 | Refills: 0 | Status: COMPLETED | OUTPATIENT
Start: 2023-07-12

## 2023-07-12 RX ORDER — METHYLPRED ACET/NACL,ISO-OS/PF 80 MG/ML
80 VIAL (ML) INJECTION
Qty: 1 | Refills: 0 | Status: COMPLETED | OUTPATIENT
Start: 2023-07-12

## 2023-07-12 RX ADMIN — Medication 0 MG/ML: at 00:00

## 2023-07-12 RX ADMIN — KETOROLAC TROMETHAMINE 2 MG/ML: 30 INJECTION, SOLUTION INTRAMUSCULAR; INTRAVENOUS at 00:00

## 2023-07-14 NOTE — HISTORY OF PRESENT ILLNESS
[FreeTextEntry1] : She is taking Gabapentin 800 mg at night (600 + 200), Ropinirole 4 mg, Prednisone 5 mg, vitamin D\par \par Since starting Wegovy, she lost 32 pounds\par \par She feels like her body is breaking down.

## 2023-07-14 NOTE — END OF VISIT
Received call from [facility] - Patient was never instructed to ASA and plavix when inpatient and scheduled by Dr Cem Cruz per Patient. She is wondering if patient will be cancelled tomorrow as he is only on a baby asa. Perfectserved Dr Cem Cruz.... 2/14/23 1:56 PM  [phone] From: Bill Estrella 54 the patient you scheduled from inpatient last week is only taking baby asa and no plavix. [facility] wants to know if patient will be cancelled?     Read 1:56 PM  2/14/23 1:57 PM  Let me ask Dr Rosibel Cline [Time Spent: ___ minutes] : I have spent [unfilled] minutes of time on the encounter.

## 2023-07-27 ENCOUNTER — TRANSCRIPTION ENCOUNTER (OUTPATIENT)
Age: 58
End: 2023-07-27

## 2023-08-12 DIAGNOSIS — G47.09 OTHER INSOMNIA: ICD-10-CM

## 2023-08-15 ENCOUNTER — APPOINTMENT (OUTPATIENT)
Dept: RHEUMATOLOGY | Facility: CLINIC | Age: 58
End: 2023-08-15
Payer: COMMERCIAL

## 2023-08-15 DIAGNOSIS — M25.50 PAIN IN UNSPECIFIED JOINT: ICD-10-CM

## 2023-08-15 DIAGNOSIS — G89.29 PAIN IN UNSPECIFIED JOINT: ICD-10-CM

## 2023-08-15 PROCEDURE — 96372 THER/PROPH/DIAG INJ SC/IM: CPT

## 2023-08-16 PROBLEM — M25.50 CHRONIC PAIN OF MULTIPLE JOINTS: Status: ACTIVE | Noted: 2020-02-07

## 2023-08-16 RX ADMIN — Medication 0 MG/ML: at 00:00

## 2023-08-16 RX ADMIN — KETOROLAC TROMETHAMINE 2 MG/ML: 30 INJECTION, SOLUTION INTRAMUSCULAR; INTRAVENOUS at 00:00

## 2023-08-17 RX ORDER — KETOROLAC TROMETHAMINE 30 MG/ML
30 INJECTION, SOLUTION INTRAMUSCULAR; INTRAVENOUS
Qty: 1 | Refills: 0 | Status: COMPLETED | OUTPATIENT
Start: 2023-08-16

## 2023-08-17 RX ORDER — METHYLPRED ACET/NACL,ISO-OS/PF 80 MG/ML
80 VIAL (ML) INJECTION
Qty: 1 | Refills: 0 | Status: COMPLETED | OUTPATIENT
Start: 2023-08-16

## 2023-09-05 ENCOUNTER — TRANSCRIPTION ENCOUNTER (OUTPATIENT)
Age: 58
End: 2023-09-05

## 2023-09-14 ENCOUNTER — APPOINTMENT (OUTPATIENT)
Dept: RHEUMATOLOGY | Facility: CLINIC | Age: 58
End: 2023-09-14
Payer: COMMERCIAL

## 2023-09-14 DIAGNOSIS — E53.8 DEFICIENCY OF OTHER SPECIFIED B GROUP VITAMINS: ICD-10-CM

## 2023-09-14 PROCEDURE — 99215 OFFICE O/P EST HI 40 MIN: CPT | Mod: 95

## 2023-09-19 ENCOUNTER — TRANSCRIPTION ENCOUNTER (OUTPATIENT)
Age: 58
End: 2023-09-19

## 2023-09-21 ENCOUNTER — APPOINTMENT (OUTPATIENT)
Dept: PAIN MANAGEMENT | Facility: CLINIC | Age: 58
End: 2023-09-21
Payer: COMMERCIAL

## 2023-09-21 VITALS
DIASTOLIC BLOOD PRESSURE: 75 MMHG | WEIGHT: 150 LBS | SYSTOLIC BLOOD PRESSURE: 112 MMHG | BODY MASS INDEX: 26.58 KG/M2 | HEIGHT: 63 IN

## 2023-09-21 PROCEDURE — 99214 OFFICE O/P EST MOD 30 MIN: CPT

## 2023-09-26 ENCOUNTER — APPOINTMENT (OUTPATIENT)
Dept: PAIN MANAGEMENT | Facility: CLINIC | Age: 58
End: 2023-09-26

## 2023-09-26 ENCOUNTER — APPOINTMENT (OUTPATIENT)
Dept: BARIATRICS/WEIGHT MGMT | Facility: CLINIC | Age: 58
End: 2023-09-26
Payer: COMMERCIAL

## 2023-09-26 VITALS
RESPIRATION RATE: 18 BRPM | HEIGHT: 63 IN | BODY MASS INDEX: 26.36 KG/M2 | HEART RATE: 107 BPM | DIASTOLIC BLOOD PRESSURE: 73 MMHG | WEIGHT: 148.8 LBS | SYSTOLIC BLOOD PRESSURE: 111 MMHG

## 2023-09-26 PROCEDURE — 99213 OFFICE O/P EST LOW 20 MIN: CPT

## 2023-09-27 ENCOUNTER — TRANSCRIPTION ENCOUNTER (OUTPATIENT)
Age: 58
End: 2023-09-27

## 2023-09-29 ENCOUNTER — APPOINTMENT (OUTPATIENT)
Dept: PAIN MANAGEMENT | Facility: CLINIC | Age: 58
End: 2023-09-29
Payer: COMMERCIAL

## 2023-09-29 VITALS
DIASTOLIC BLOOD PRESSURE: 78 MMHG | SYSTOLIC BLOOD PRESSURE: 110 MMHG | WEIGHT: 148 LBS | BODY MASS INDEX: 26.22 KG/M2 | OXYGEN SATURATION: 98 % | HEIGHT: 63 IN | HEART RATE: 105 BPM | RESPIRATION RATE: 17 BRPM

## 2023-09-29 PROCEDURE — 64493 INJ PARAVERT F JNT L/S 1 LEV: CPT | Mod: RT

## 2023-09-29 PROCEDURE — 62323 NJX INTERLAMINAR LMBR/SAC: CPT

## 2023-10-12 ENCOUNTER — APPOINTMENT (OUTPATIENT)
Dept: PAIN MANAGEMENT | Facility: CLINIC | Age: 58
End: 2023-10-12
Payer: COMMERCIAL

## 2023-10-12 VITALS
HEIGHT: 63 IN | BODY MASS INDEX: 26.22 KG/M2 | DIASTOLIC BLOOD PRESSURE: 76 MMHG | WEIGHT: 148 LBS | SYSTOLIC BLOOD PRESSURE: 124 MMHG

## 2023-10-12 PROCEDURE — 99214 OFFICE O/P EST MOD 30 MIN: CPT

## 2023-10-18 ENCOUNTER — APPOINTMENT (OUTPATIENT)
Dept: PAIN MANAGEMENT | Facility: CLINIC | Age: 58
End: 2023-10-18
Payer: COMMERCIAL

## 2023-10-18 VITALS
HEIGHT: 63 IN | BODY MASS INDEX: 25.87 KG/M2 | SYSTOLIC BLOOD PRESSURE: 121 MMHG | HEART RATE: 109 BPM | OXYGEN SATURATION: 99 % | RESPIRATION RATE: 16 BRPM | DIASTOLIC BLOOD PRESSURE: 84 MMHG | WEIGHT: 146 LBS

## 2023-10-18 PROCEDURE — 64484 NJX AA&/STRD TFRM EPI L/S EA: CPT

## 2023-10-18 PROCEDURE — 64483 NJX AA&/STRD TFRM EPI L/S 1: CPT

## 2023-10-18 PROCEDURE — 64493 INJ PARAVERT F JNT L/S 1 LEV: CPT

## 2023-10-19 ENCOUNTER — TRANSCRIPTION ENCOUNTER (OUTPATIENT)
Age: 58
End: 2023-10-19

## 2023-10-23 ENCOUNTER — RX RENEWAL (OUTPATIENT)
Age: 58
End: 2023-10-23

## 2023-10-26 ENCOUNTER — RESULT REVIEW (OUTPATIENT)
Age: 58
End: 2023-10-26

## 2023-10-31 ENCOUNTER — APPOINTMENT (OUTPATIENT)
Dept: FAMILY MEDICINE | Facility: CLINIC | Age: 58
End: 2023-10-31
Payer: COMMERCIAL

## 2023-10-31 VITALS — DIASTOLIC BLOOD PRESSURE: 76 MMHG | SYSTOLIC BLOOD PRESSURE: 121 MMHG

## 2023-10-31 VITALS
WEIGHT: 146 LBS | HEART RATE: 102 BPM | BODY MASS INDEX: 25.87 KG/M2 | RESPIRATION RATE: 16 BRPM | OXYGEN SATURATION: 99 % | HEIGHT: 63 IN

## 2023-10-31 DIAGNOSIS — F17.200 NICOTINE DEPENDENCE, UNSPECIFIED, UNCOMPLICATED: ICD-10-CM

## 2023-10-31 DIAGNOSIS — Z00.00 ENCOUNTER FOR GENERAL ADULT MEDICAL EXAMINATION W/OUT ABNORMAL FINDINGS: ICD-10-CM

## 2023-10-31 PROCEDURE — 99396 PREV VISIT EST AGE 40-64: CPT

## 2023-11-01 ENCOUNTER — APPOINTMENT (OUTPATIENT)
Dept: PAIN MANAGEMENT | Facility: CLINIC | Age: 58
End: 2023-11-01
Payer: COMMERCIAL

## 2023-11-01 DIAGNOSIS — M71.38 OTHER BURSAL CYST, OTHER SITE: ICD-10-CM

## 2023-11-01 PROCEDURE — 99214 OFFICE O/P EST MOD 30 MIN: CPT

## 2023-11-03 PROBLEM — Z00.00 ENCOUNTER FOR PREVENTIVE HEALTH EXAMINATION: Status: ACTIVE | Noted: 2018-12-09

## 2023-11-03 PROBLEM — F17.200 CURRENT SMOKER: Status: ACTIVE | Noted: 2019-01-08

## 2023-11-07 ENCOUNTER — APPOINTMENT (OUTPATIENT)
Dept: PAIN MANAGEMENT | Facility: CLINIC | Age: 58
End: 2023-11-07
Payer: COMMERCIAL

## 2023-11-07 VITALS
OXYGEN SATURATION: 100 % | HEART RATE: 106 BPM | DIASTOLIC BLOOD PRESSURE: 91 MMHG | BODY MASS INDEX: 25.52 KG/M2 | HEIGHT: 63 IN | RESPIRATION RATE: 16 BRPM | SYSTOLIC BLOOD PRESSURE: 148 MMHG | WEIGHT: 144 LBS

## 2023-11-07 DIAGNOSIS — M96.1 POSTLAMINECTOMY SYNDROME, NOT ELSEWHERE CLASSIFIED: ICD-10-CM

## 2023-11-07 PROCEDURE — 64484 NJX AA&/STRD TFRM EPI L/S EA: CPT

## 2023-11-07 PROCEDURE — 64493 INJ PARAVERT F JNT L/S 1 LEV: CPT

## 2023-11-07 PROCEDURE — 64483 NJX AA&/STRD TFRM EPI L/S 1: CPT

## 2023-11-17 ENCOUNTER — APPOINTMENT (OUTPATIENT)
Dept: INTERNAL MEDICINE | Facility: CLINIC | Age: 58
End: 2023-11-17
Payer: COMMERCIAL

## 2023-11-17 VITALS
SYSTOLIC BLOOD PRESSURE: 120 MMHG | RESPIRATION RATE: 16 BRPM | BODY MASS INDEX: 25.87 KG/M2 | HEIGHT: 63 IN | OXYGEN SATURATION: 96 % | WEIGHT: 146 LBS | HEART RATE: 110 BPM | DIASTOLIC BLOOD PRESSURE: 81 MMHG

## 2023-11-17 DIAGNOSIS — J20.9 ACUTE BRONCHITIS, UNSPECIFIED: ICD-10-CM

## 2023-11-17 PROCEDURE — 99213 OFFICE O/P EST LOW 20 MIN: CPT

## 2023-11-17 RX ORDER — BUDESONIDE AND FORMOTEROL FUMARATE DIHYDRATE 160; 4.5 UG/1; UG/1
160-4.5 AEROSOL RESPIRATORY (INHALATION) TWICE DAILY
Qty: 1 | Refills: 0 | Status: ACTIVE | COMMUNITY
Start: 2023-11-17 | End: 1900-01-01

## 2023-11-17 RX ORDER — ALBUTEROL SULFATE 90 UG/1
108 (90 BASE) INHALANT RESPIRATORY (INHALATION)
Qty: 1 | Refills: 0 | Status: ACTIVE | COMMUNITY
Start: 2021-08-31 | End: 1900-01-01

## 2023-11-22 ENCOUNTER — APPOINTMENT (OUTPATIENT)
Dept: PAIN MANAGEMENT | Facility: CLINIC | Age: 58
End: 2023-11-22

## 2023-12-01 ENCOUNTER — APPOINTMENT (OUTPATIENT)
Dept: FAMILY MEDICINE | Facility: CLINIC | Age: 58
End: 2023-12-01
Payer: COMMERCIAL

## 2023-12-01 ENCOUNTER — LABORATORY RESULT (OUTPATIENT)
Age: 58
End: 2023-12-01

## 2023-12-01 ENCOUNTER — NON-APPOINTMENT (OUTPATIENT)
Age: 58
End: 2023-12-01

## 2023-12-01 VITALS
HEIGHT: 63 IN | HEART RATE: 111 BPM | RESPIRATION RATE: 16 BRPM | OXYGEN SATURATION: 96 % | DIASTOLIC BLOOD PRESSURE: 73 MMHG | SYSTOLIC BLOOD PRESSURE: 111 MMHG | WEIGHT: 148 LBS | BODY MASS INDEX: 26.22 KG/M2

## 2023-12-01 DIAGNOSIS — Z01.818 ENCOUNTER FOR OTHER PREPROCEDURAL EXAMINATION: ICD-10-CM

## 2023-12-01 PROCEDURE — 99214 OFFICE O/P EST MOD 30 MIN: CPT

## 2023-12-04 PROBLEM — Z01.818 PRE-OP EVALUATION: Status: ACTIVE | Noted: 2021-10-08

## 2023-12-08 LAB
ALBUMIN SERPL ELPH-MCNC: 3.9 G/DL
ALP BLD-CCNC: 90 U/L
ALT SERPL-CCNC: 12 U/L
ANION GAP SERPL CALC-SCNC: 12 MMOL/L
APPEARANCE: ABNORMAL
APTT BLD: 26 SEC
AST SERPL-CCNC: 12 U/L
BACTERIA UR CULT: NORMAL
BILIRUB SERPL-MCNC: <0.2 MG/DL
BILIRUBIN URINE: NEGATIVE
BLOOD URINE: NEGATIVE
BUN SERPL-MCNC: 8 MG/DL
CALCIUM SERPL-MCNC: 9.2 MG/DL
CHLORIDE SERPL-SCNC: 105 MMOL/L
CO2 SERPL-SCNC: 25 MMOL/L
COLOR: YELLOW
CREAT SERPL-MCNC: 0.76 MG/DL
EGFR: 90 ML/MIN/1.73M2
GLUCOSE QUALITATIVE U: NEGATIVE MG/DL
GLUCOSE SERPL-MCNC: 91 MG/DL
HCT VFR BLD CALC: 41 %
HGB BLD-MCNC: 12.8 G/DL
INR PPP: 0.91 RATIO
KETONES URINE: NEGATIVE MG/DL
LEUKOCYTE ESTERASE URINE: NEGATIVE
MCHC RBC-ENTMCNC: 28.9 PG
MCHC RBC-ENTMCNC: 31.2 GM/DL
MCV RBC AUTO: 92.6 FL
NITRITE URINE: NEGATIVE
PH URINE: 6
PLATELET # BLD AUTO: 357 K/UL
POTASSIUM SERPL-SCNC: 4.4 MMOL/L
PROT SERPL-MCNC: 6.5 G/DL
PROTEIN URINE: NEGATIVE MG/DL
PT BLD: 10.3 SEC
RBC # BLD: 4.43 M/UL
RBC # FLD: 13.9 %
SODIUM SERPL-SCNC: 142 MMOL/L
SPECIFIC GRAVITY URINE: 1.01
UROBILINOGEN URINE: 0.2 MG/DL
WBC # FLD AUTO: 9.03 K/UL

## 2023-12-13 ENCOUNTER — APPOINTMENT (OUTPATIENT)
Dept: RHEUMATOLOGY | Facility: CLINIC | Age: 58
End: 2023-12-13
Payer: COMMERCIAL

## 2023-12-13 VITALS
BODY MASS INDEX: 26.4 KG/M2 | OXYGEN SATURATION: 98 % | DIASTOLIC BLOOD PRESSURE: 64 MMHG | WEIGHT: 149 LBS | HEART RATE: 113 BPM | HEIGHT: 63 IN | SYSTOLIC BLOOD PRESSURE: 110 MMHG

## 2023-12-13 DIAGNOSIS — M79.7 FIBROMYALGIA: ICD-10-CM

## 2023-12-13 DIAGNOSIS — M47.817 SPONDYLOSIS W/OUT MYELOPATHY OR RADICULOPATHY, LUMBOSACRAL REGION: ICD-10-CM

## 2023-12-13 DIAGNOSIS — M19.91 PRIMARY OSTEOARTHRITIS, UNSPECIFIED SITE: ICD-10-CM

## 2023-12-13 DIAGNOSIS — M35.3 POLYMYALGIA RHEUMATICA: ICD-10-CM

## 2023-12-13 DIAGNOSIS — E55.9 VITAMIN D DEFICIENCY, UNSPECIFIED: ICD-10-CM

## 2023-12-13 DIAGNOSIS — G25.81 RESTLESS LEGS SYNDROME: ICD-10-CM

## 2023-12-13 PROCEDURE — 99215 OFFICE O/P EST HI 40 MIN: CPT

## 2023-12-15 VITALS
WEIGHT: 152.56 LBS | TEMPERATURE: 98 F | SYSTOLIC BLOOD PRESSURE: 113 MMHG | DIASTOLIC BLOOD PRESSURE: 65 MMHG | HEART RATE: 99 BPM | HEIGHT: 63 IN | RESPIRATION RATE: 16 BRPM | OXYGEN SATURATION: 96 %

## 2023-12-15 RX ORDER — ALBUTEROL 90 UG/1
2 AEROSOL, METERED ORAL
Qty: 0 | Refills: 0 | DISCHARGE

## 2023-12-15 RX ORDER — POVIDONE-IODINE 5 %
1 AEROSOL (ML) TOPICAL ONCE
Refills: 0 | Status: COMPLETED | OUTPATIENT
Start: 2023-12-18 | End: 2023-12-18

## 2023-12-15 RX ORDER — GABAPENTIN 400 MG/1
700 CAPSULE ORAL
Qty: 0 | Refills: 0 | DISCHARGE

## 2023-12-15 RX ORDER — ESOMEPRAZOLE MAGNESIUM 40 MG/1
1 CAPSULE, DELAYED RELEASE ORAL
Qty: 0 | Refills: 0 | DISCHARGE

## 2023-12-15 NOTE — PATIENT PROFILE ADULT - FALL HARM RISK - UNIVERSAL INTERVENTIONS
Bed in lowest position, wheels locked, appropriate side rails in place/Call bell, personal items and telephone in reach/Instruct patient to call for assistance before getting out of bed or chair/Non-slip footwear when patient is out of bed/[place] to call system/Physically safe environment - no spills, clutter or unnecessary equipment/Purposeful Proactive Rounding/Room/bathroom lighting operational, light cord in reach Bed in lowest position, wheels locked, appropriate side rails in place/Call bell, personal items and telephone in reach/Instruct patient to call for assistance before getting out of bed or chair/Non-slip footwear when patient is out of bed/[place] to call system/Physically safe environment - no spills, clutter or unnecessary equipment/Purposeful Proactive Rounding/Room/bathroom lighting operational, light cord in reach

## 2023-12-15 NOTE — H&P ADULT - HISTORY OF PRESENT ILLNESS
58F with low back pain x    Presents for elective laminectomy with excision of intracanalicular cyst with a subarticular facet cyst right L3-L4. 58F with low back pain x chronic despite conservative therapies for her symptoms. Of note had L4-5 spinal fusion in 2021 but has had continued pain. Complains of back pain that radiates down her right lower extremity (states has occasional numbness/tingling down her right lower extremity). Denies use of assistive walking devices or history of DVT. States takes oxycodone at night occasionally for her pain.     Presents for elective laminectomy with excision of intracanalicular cyst with a subarticular facet cyst right L3-L4.

## 2023-12-15 NOTE — H&P ADULT - NSICDXPASTMEDICALHX_GEN_ALL_CORE_FT
PAST MEDICAL HISTORY:  Celiac disease     Fibromyalgia     GERD (gastroesophageal reflux disease)     Hypothyroid     PMR (polymyalgia rheumatica)     Spinal stenosis of lumbar region

## 2023-12-15 NOTE — H&P ADULT - PROBLEM SELECTOR PLAN 1
Admit to Orthopedic Service for elective laminectomy with excision of intracanalicular cyst with a subarticular facet cyst right L3-L4.    Medically cleared and optimized for surgery by Dr. López

## 2023-12-15 NOTE — H&P ADULT - NSHPPHYSICALEXAM_GEN_ALL_CORE
Gen: 58F NAD  MSK: Decreased lumbar ROM secondary to pain      Rest of PE per MD clearance Gen: 58F NAD  MSK: : Skin warm and well perfused. DP pulses palpable bilateral lower extremities. Sensation intact and equal bilateral lower extremities. EHL/TA/GS/FHL 5/5 bilateral lower extremities.  Decreased lumbar ROM secondary to pain      Rest of PE per MD clearance

## 2023-12-15 NOTE — H&P ADULT - NSHPLABSRESULTS_GEN_ALL_CORE
Preop CBC (12.8), BMP, PT/INR, PTT within normal range and reviewed per medical clearance  Cr- .76  UA: WNL  Preop CXR within normal limits and reviewed per medical clearance   Preop EKG within normal limits and reviewed per medical clearance; 107 bpm  3M: DOS  T + S: DOS Preop CBC (12.8), BMP, PT/INR, PTT within normal range and reviewed per medical clearance  Cr- .76  UA: WNL  Preop CXR within normal limits and reviewed per medical clearance   Preop EKG within normal limits and reviewed per medical clearance; 107 bpm  Povidone iodine nasal swab to be given day of surgery   T + S: DOS

## 2023-12-15 NOTE — H&P ADULT - NSICDXPASTSURGICALHX_GEN_ALL_CORE_FT
PAST SURGICAL HISTORY:  Complete rotator cuff tear left 2017    Elective surgery ankle surgery x 3    H/O laminectomy     H/O spinal fusion

## 2023-12-17 ENCOUNTER — TRANSCRIPTION ENCOUNTER (OUTPATIENT)
Age: 58
End: 2023-12-17

## 2023-12-18 ENCOUNTER — INPATIENT (INPATIENT)
Facility: HOSPITAL | Age: 58
LOS: 0 days | Discharge: HOME | DRG: 520 | End: 2023-12-19
Payer: COMMERCIAL

## 2023-12-18 ENCOUNTER — TRANSCRIPTION ENCOUNTER (OUTPATIENT)
Age: 58
End: 2023-12-18

## 2023-12-18 ENCOUNTER — APPOINTMENT (OUTPATIENT)
Dept: ORTHOPEDIC SURGERY | Facility: HOSPITAL | Age: 58
End: 2023-12-18
Payer: COMMERCIAL

## 2023-12-18 DIAGNOSIS — E03.9 HYPOTHYROIDISM, UNSPECIFIED: ICD-10-CM

## 2023-12-18 DIAGNOSIS — M75.120 COMPLETE ROTATOR CUFF TEAR OR RUPTURE OF UNSPECIFIED SHOULDER, NOT SPECIFIED AS TRAUMATIC: Chronic | ICD-10-CM

## 2023-12-18 DIAGNOSIS — Z98.890 OTHER SPECIFIED POSTPROCEDURAL STATES: Chronic | ICD-10-CM

## 2023-12-18 DIAGNOSIS — Z98.1 ARTHRODESIS STATUS: Chronic | ICD-10-CM

## 2023-12-18 DIAGNOSIS — M79.7 FIBROMYALGIA: ICD-10-CM

## 2023-12-18 DIAGNOSIS — K21.9 GASTRO-ESOPHAGEAL REFLUX DISEASE WITHOUT ESOPHAGITIS: ICD-10-CM

## 2023-12-18 DIAGNOSIS — K90.0 CELIAC DISEASE: ICD-10-CM

## 2023-12-18 DIAGNOSIS — Z41.9 ENCOUNTER FOR PROCEDURE FOR PURPOSES OTHER THAN REMEDYING HEALTH STATE, UNSPECIFIED: Chronic | ICD-10-CM

## 2023-12-18 DIAGNOSIS — M48.061 SPINAL STENOSIS, LUMBAR REGION WITHOUT NEUROGENIC CLAUDICATION: ICD-10-CM

## 2023-12-18 DIAGNOSIS — M35.3 POLYMYALGIA RHEUMATICA: ICD-10-CM

## 2023-12-18 DIAGNOSIS — M71.38 OTHER BURSAL CYST, OTHER SITE: ICD-10-CM

## 2023-12-18 PROCEDURE — 63047 LAM FACETEC & FORAMOT LUMBAR: CPT | Mod: 22

## 2023-12-18 DEVICE — SURGIFOAM PAD 8CM X 12.5CM X 10MM (100): Type: IMPLANTABLE DEVICE | Status: FUNCTIONAL

## 2023-12-18 DEVICE — SURGIFLO HEMOSTATIC MATRIX KIT: Type: IMPLANTABLE DEVICE | Status: FUNCTIONAL

## 2023-12-18 RX ORDER — ACETAMINOPHEN 500 MG
650 TABLET ORAL EVERY 4 HOURS
Refills: 0 | Status: DISCONTINUED | OUTPATIENT
Start: 2023-12-18 | End: 2023-12-19

## 2023-12-18 RX ORDER — ACETAMINOPHEN 500 MG
1000 TABLET ORAL ONCE
Refills: 0 | Status: COMPLETED | OUTPATIENT
Start: 2023-12-18 | End: 2023-12-18

## 2023-12-18 RX ORDER — ROPINIROLE 8 MG/1
4 TABLET, FILM COATED, EXTENDED RELEASE ORAL AT BEDTIME
Refills: 0 | Status: DISCONTINUED | OUTPATIENT
Start: 2023-12-18 | End: 2023-12-19

## 2023-12-18 RX ORDER — LEVOTHYROXINE SODIUM 125 MCG
1 TABLET ORAL
Qty: 0 | Refills: 0 | DISCHARGE

## 2023-12-18 RX ORDER — CHLORHEXIDINE GLUCONATE 213 G/1000ML
1 SOLUTION TOPICAL ONCE
Refills: 0 | Status: COMPLETED | OUTPATIENT
Start: 2023-12-18 | End: 2023-12-18

## 2023-12-18 RX ORDER — ROPINIROLE 8 MG/1
0 TABLET, FILM COATED, EXTENDED RELEASE ORAL
Qty: 0 | Refills: 0 | DISCHARGE

## 2023-12-18 RX ORDER — GABAPENTIN 400 MG/1
1 CAPSULE ORAL
Refills: 0 | DISCHARGE

## 2023-12-18 RX ORDER — HYDROMORPHONE HYDROCHLORIDE 2 MG/ML
0.5 INJECTION INTRAMUSCULAR; INTRAVENOUS; SUBCUTANEOUS
Refills: 0 | Status: DISCONTINUED | OUTPATIENT
Start: 2023-12-18 | End: 2023-12-19

## 2023-12-18 RX ORDER — SENNA PLUS 8.6 MG/1
2 TABLET ORAL AT BEDTIME
Refills: 0 | Status: DISCONTINUED | OUTPATIENT
Start: 2023-12-18 | End: 2023-12-19

## 2023-12-18 RX ORDER — LEVOTHYROXINE SODIUM 125 MCG
112 TABLET ORAL DAILY
Refills: 0 | Status: DISCONTINUED | OUTPATIENT
Start: 2023-12-18 | End: 2023-12-19

## 2023-12-18 RX ORDER — APREPITANT 80 MG/1
40 CAPSULE ORAL ONCE
Refills: 0 | Status: COMPLETED | OUTPATIENT
Start: 2023-12-18 | End: 2023-12-18

## 2023-12-18 RX ORDER — POLYETHYLENE GLYCOL 3350 17 G/17G
17 POWDER, FOR SOLUTION ORAL DAILY
Refills: 0 | Status: DISCONTINUED | OUTPATIENT
Start: 2023-12-18 | End: 2023-12-19

## 2023-12-18 RX ORDER — CHOLECALCIFEROL (VITAMIN D3) 125 MCG
1 CAPSULE ORAL
Qty: 0 | Refills: 0 | DISCHARGE

## 2023-12-18 RX ORDER — PHENTERMINE HCL 30 MG
1 CAPSULE ORAL
Refills: 0 | DISCHARGE

## 2023-12-18 RX ORDER — ONDANSETRON 8 MG/1
4 TABLET, FILM COATED ORAL EVERY 6 HOURS
Refills: 0 | Status: DISCONTINUED | OUTPATIENT
Start: 2023-12-18 | End: 2023-12-19

## 2023-12-18 RX ORDER — SODIUM CHLORIDE 9 MG/ML
1000 INJECTION, SOLUTION INTRAVENOUS
Refills: 0 | Status: DISCONTINUED | OUTPATIENT
Start: 2023-12-18 | End: 2023-12-19

## 2023-12-18 RX ORDER — CEFAZOLIN SODIUM 1 G
2000 VIAL (EA) INJECTION EVERY 8 HOURS
Refills: 0 | Status: COMPLETED | OUTPATIENT
Start: 2023-12-18 | End: 2023-12-19

## 2023-12-18 RX ORDER — LORATADINE 10 MG/1
1 TABLET ORAL
Qty: 0 | Refills: 0 | DISCHARGE

## 2023-12-18 RX ORDER — GABAPENTIN 400 MG/1
800 CAPSULE ORAL AT BEDTIME
Refills: 0 | Status: DISCONTINUED | OUTPATIENT
Start: 2023-12-18 | End: 2023-12-19

## 2023-12-18 RX ORDER — TRAMADOL HYDROCHLORIDE 50 MG/1
50 TABLET ORAL EVERY 4 HOURS
Refills: 0 | Status: DISCONTINUED | OUTPATIENT
Start: 2023-12-18 | End: 2023-12-19

## 2023-12-18 RX ORDER — OMEPRAZOLE 10 MG/1
1 CAPSULE, DELAYED RELEASE ORAL
Refills: 0 | DISCHARGE

## 2023-12-18 RX ORDER — CYCLOBENZAPRINE HYDROCHLORIDE 10 MG/1
10 TABLET, FILM COATED ORAL EVERY 8 HOURS
Refills: 0 | Status: DISCONTINUED | OUTPATIENT
Start: 2023-12-18 | End: 2023-12-19

## 2023-12-18 RX ORDER — HYDROMORPHONE HYDROCHLORIDE 2 MG/ML
0.5 INJECTION INTRAMUSCULAR; INTRAVENOUS; SUBCUTANEOUS EVERY 4 HOURS
Refills: 0 | Status: DISCONTINUED | OUTPATIENT
Start: 2023-12-18 | End: 2023-12-19

## 2023-12-18 RX ORDER — OXYCODONE HYDROCHLORIDE 5 MG/1
5 TABLET ORAL EVERY 4 HOURS
Refills: 0 | Status: DISCONTINUED | OUTPATIENT
Start: 2023-12-18 | End: 2023-12-19

## 2023-12-18 RX ADMIN — CHLORHEXIDINE GLUCONATE 1 APPLICATION(S): 213 SOLUTION TOPICAL at 07:19

## 2023-12-18 RX ADMIN — GABAPENTIN 800 MILLIGRAM(S): 400 CAPSULE ORAL at 23:19

## 2023-12-18 RX ADMIN — SODIUM CHLORIDE 100 MILLILITER(S): 9 INJECTION, SOLUTION INTRAVENOUS at 10:43

## 2023-12-18 RX ADMIN — CYCLOBENZAPRINE HYDROCHLORIDE 10 MILLIGRAM(S): 10 TABLET, FILM COATED ORAL at 13:33

## 2023-12-18 RX ADMIN — Medication 1 APPLICATION(S): at 07:19

## 2023-12-18 RX ADMIN — APREPITANT 40 MILLIGRAM(S): 80 CAPSULE ORAL at 07:21

## 2023-12-18 RX ADMIN — Medication 100 MILLIGRAM(S): at 18:03

## 2023-12-18 RX ADMIN — SENNA PLUS 2 TABLET(S): 8.6 TABLET ORAL at 22:21

## 2023-12-18 RX ADMIN — TRAMADOL HYDROCHLORIDE 50 MILLIGRAM(S): 50 TABLET ORAL at 23:52

## 2023-12-18 RX ADMIN — ROPINIROLE 4 MILLIGRAM(S): 8 TABLET, FILM COATED, EXTENDED RELEASE ORAL at 23:19

## 2023-12-18 RX ADMIN — Medication 1000 MILLIGRAM(S): at 07:21

## 2023-12-18 RX ADMIN — CYCLOBENZAPRINE HYDROCHLORIDE 10 MILLIGRAM(S): 10 TABLET, FILM COATED ORAL at 22:21

## 2023-12-18 NOTE — PROGRESS NOTE ADULT - SUBJECTIVE AND OBJECTIVE BOX
Ortho Post Op Check    Procedure: Laminectomy, excision of cyst L3-4   Surgeon: Dr. Oconnor     Patient seen and examined at bedside in the PACU   Pt comfortable without complaints, pain controlled, notes some incisional pain   Denies CP, SOB, N/V, numbness/tingling     Vital Signs Last 24 Hrs  T(C): 36.4 (12-18-23 @ 13:13), Max: 36.7 (12-18-23 @ 07:37)  T(F): 97.6 (12-18-23 @ 13:13), Max: 97.8 (12-18-23 @ 11:13)  HR: 86 (12-18-23 @ 13:13) (82 - 99)  BP: 107/64 (12-18-23 @ 13:13) (95/54 - 113/65)  BP(mean): 78 (12-18-23 @ 13:13) (70 - 737)  RR: 13 (12-18-23 @ 13:13) (13 - 24)  SpO2: 95% (12-18-23 @ 13:13) (94% - 97%)  AVSS    General: Pt Alert and oriented, NAD  Dressing C/D/I, HV x1 in place   Pulses: 2+ DP pulses bilateral LE   Sensation: intact to light touch bilateral lower extremities   Motor: EHL/FHL/TA/GS 5/5 bilateral LE         A/P: 58yFemale POD#0 s/p Laminectomy, excision of cyst L3-4     - Stable in PACU   - Pain Control: IV and PO PRN   - DVT ppx: SCDs  - Post op abx: ancef 2g IV x2   - PT, WBS: WBAT  - Dispo: admit to regional     Ortho Pager [phone] Ortho Post Op Check    Procedure: Laminectomy, excision of cyst L3-4   Surgeon: Dr. Oconnor     Patient seen and examined at bedside in the PACU   Pt comfortable without complaints, pain controlled, notes some incisional pain   Denies CP, SOB, N/V, numbness/tingling     Vital Signs Last 24 Hrs  T(C): 36.4 (12-18-23 @ 13:13), Max: 36.7 (12-18-23 @ 07:37)  T(F): 97.6 (12-18-23 @ 13:13), Max: 97.8 (12-18-23 @ 11:13)  HR: 86 (12-18-23 @ 13:13) (82 - 99)  BP: 107/64 (12-18-23 @ 13:13) (95/54 - 113/65)  BP(mean): 78 (12-18-23 @ 13:13) (70 - 737)  RR: 13 (12-18-23 @ 13:13) (13 - 24)  SpO2: 95% (12-18-23 @ 13:13) (94% - 97%)  AVSS    General: Pt Alert and oriented, NAD  Dressing C/D/I, HV x1 in place   Pulses: 2+ DP pulses bilateral LE   Sensation: intact to light touch bilateral lower extremities   Motor: EHL/FHL/TA/GS 5/5 bilateral LE         A/P: 58yFemale POD#0 s/p Laminectomy, excision of cyst L3-4     - Stable in PACU   - Pain Control: IV and PO PRN   - DVT ppx: SCDs  - Post op abx: ancef 2g IV x2   - PT, WBS: WBAT  - Dispo: admit to regional     Ortho Pager [phone]

## 2023-12-18 NOTE — PHYSICAL THERAPY INITIAL EVALUATION ADULT - PERTINENT HX OF CURRENT PROBLEM, REHAB EVAL
58F with low back pain x chronic despite conservative therapies for her symptoms. Of note had L4-5 spinal fusion in 2021 but has had continued pain. Complains of back pain that radiates down her right lower extremity (states has occasional numbness/tingling down her right lower extremity). Denies use of assistive walking devices or history of DVT. States takes oxycodone at night occasionally for her pain.     Presents for elective laminectomy with excision of intracanalicular cyst with a subarticular facet cyst right L3-L4.

## 2023-12-18 NOTE — PHYSICAL THERAPY INITIAL EVALUATION ADULT - ADDITIONAL COMMENTS
Pt will be staying with her mother after d/c and will be in a private home. Pt reports to be indpt with all ADLs and IADLs prior to admission.

## 2023-12-19 ENCOUNTER — TRANSCRIPTION ENCOUNTER (OUTPATIENT)
Age: 58
End: 2023-12-19

## 2023-12-19 VITALS
OXYGEN SATURATION: 97 % | TEMPERATURE: 98 F | RESPIRATION RATE: 17 BRPM | DIASTOLIC BLOOD PRESSURE: 70 MMHG | SYSTOLIC BLOOD PRESSURE: 102 MMHG | HEART RATE: 97 BPM

## 2023-12-19 LAB
ANION GAP SERPL CALC-SCNC: 11 MMOL/L — SIGNIFICANT CHANGE UP (ref 5–17)
ANION GAP SERPL CALC-SCNC: 11 MMOL/L — SIGNIFICANT CHANGE UP (ref 5–17)
BUN SERPL-MCNC: 8 MG/DL — SIGNIFICANT CHANGE UP (ref 7–23)
BUN SERPL-MCNC: 8 MG/DL — SIGNIFICANT CHANGE UP (ref 7–23)
CALCIUM SERPL-MCNC: 9.1 MG/DL — SIGNIFICANT CHANGE UP (ref 8.4–10.5)
CALCIUM SERPL-MCNC: 9.1 MG/DL — SIGNIFICANT CHANGE UP (ref 8.4–10.5)
CHLORIDE SERPL-SCNC: 105 MMOL/L — SIGNIFICANT CHANGE UP (ref 96–108)
CHLORIDE SERPL-SCNC: 105 MMOL/L — SIGNIFICANT CHANGE UP (ref 96–108)
CO2 SERPL-SCNC: 26 MMOL/L — SIGNIFICANT CHANGE UP (ref 22–31)
CO2 SERPL-SCNC: 26 MMOL/L — SIGNIFICANT CHANGE UP (ref 22–31)
CREAT SERPL-MCNC: 0.63 MG/DL — SIGNIFICANT CHANGE UP (ref 0.5–1.3)
CREAT SERPL-MCNC: 0.63 MG/DL — SIGNIFICANT CHANGE UP (ref 0.5–1.3)
EGFR: 102 ML/MIN/1.73M2 — SIGNIFICANT CHANGE UP
EGFR: 102 ML/MIN/1.73M2 — SIGNIFICANT CHANGE UP
GLUCOSE SERPL-MCNC: 100 MG/DL — HIGH (ref 70–99)
GLUCOSE SERPL-MCNC: 100 MG/DL — HIGH (ref 70–99)
HCT VFR BLD CALC: 36.6 % — SIGNIFICANT CHANGE UP (ref 34.5–45)
HCT VFR BLD CALC: 36.6 % — SIGNIFICANT CHANGE UP (ref 34.5–45)
HGB BLD-MCNC: 11.8 G/DL — SIGNIFICANT CHANGE UP (ref 11.5–15.5)
HGB BLD-MCNC: 11.8 G/DL — SIGNIFICANT CHANGE UP (ref 11.5–15.5)
MCHC RBC-ENTMCNC: 29.6 PG — SIGNIFICANT CHANGE UP (ref 27–34)
MCHC RBC-ENTMCNC: 29.6 PG — SIGNIFICANT CHANGE UP (ref 27–34)
MCHC RBC-ENTMCNC: 32.2 GM/DL — SIGNIFICANT CHANGE UP (ref 32–36)
MCHC RBC-ENTMCNC: 32.2 GM/DL — SIGNIFICANT CHANGE UP (ref 32–36)
MCV RBC AUTO: 91.7 FL — SIGNIFICANT CHANGE UP (ref 80–100)
MCV RBC AUTO: 91.7 FL — SIGNIFICANT CHANGE UP (ref 80–100)
NRBC # BLD: 0 /100 WBCS — SIGNIFICANT CHANGE UP (ref 0–0)
NRBC # BLD: 0 /100 WBCS — SIGNIFICANT CHANGE UP (ref 0–0)
PLATELET # BLD AUTO: 279 K/UL — SIGNIFICANT CHANGE UP (ref 150–400)
PLATELET # BLD AUTO: 279 K/UL — SIGNIFICANT CHANGE UP (ref 150–400)
POTASSIUM SERPL-MCNC: 5.2 MMOL/L — SIGNIFICANT CHANGE UP (ref 3.5–5.3)
POTASSIUM SERPL-MCNC: 5.2 MMOL/L — SIGNIFICANT CHANGE UP (ref 3.5–5.3)
POTASSIUM SERPL-SCNC: 5.2 MMOL/L — SIGNIFICANT CHANGE UP (ref 3.5–5.3)
POTASSIUM SERPL-SCNC: 5.2 MMOL/L — SIGNIFICANT CHANGE UP (ref 3.5–5.3)
RBC # BLD: 3.99 M/UL — SIGNIFICANT CHANGE UP (ref 3.8–5.2)
RBC # BLD: 3.99 M/UL — SIGNIFICANT CHANGE UP (ref 3.8–5.2)
RBC # FLD: 14.3 % — SIGNIFICANT CHANGE UP (ref 10.3–14.5)
RBC # FLD: 14.3 % — SIGNIFICANT CHANGE UP (ref 10.3–14.5)
SODIUM SERPL-SCNC: 142 MMOL/L — SIGNIFICANT CHANGE UP (ref 135–145)
SODIUM SERPL-SCNC: 142 MMOL/L — SIGNIFICANT CHANGE UP (ref 135–145)
WBC # BLD: 10.35 K/UL — SIGNIFICANT CHANGE UP (ref 3.8–10.5)
WBC # BLD: 10.35 K/UL — SIGNIFICANT CHANGE UP (ref 3.8–10.5)
WBC # FLD AUTO: 10.35 K/UL — SIGNIFICANT CHANGE UP (ref 3.8–10.5)
WBC # FLD AUTO: 10.35 K/UL — SIGNIFICANT CHANGE UP (ref 3.8–10.5)

## 2023-12-19 PROCEDURE — 36415 COLL VENOUS BLD VENIPUNCTURE: CPT

## 2023-12-19 PROCEDURE — 80048 BASIC METABOLIC PNL TOTAL CA: CPT

## 2023-12-19 PROCEDURE — 97161 PT EVAL LOW COMPLEX 20 MIN: CPT

## 2023-12-19 PROCEDURE — 97116 GAIT TRAINING THERAPY: CPT

## 2023-12-19 PROCEDURE — 85027 COMPLETE CBC AUTOMATED: CPT

## 2023-12-19 PROCEDURE — 86850 RBC ANTIBODY SCREEN: CPT

## 2023-12-19 PROCEDURE — C1889: CPT

## 2023-12-19 PROCEDURE — 99222 1ST HOSP IP/OBS MODERATE 55: CPT

## 2023-12-19 PROCEDURE — 86900 BLOOD TYPING SEROLOGIC ABO: CPT

## 2023-12-19 PROCEDURE — 76000 FLUOROSCOPY <1 HR PHYS/QHP: CPT

## 2023-12-19 PROCEDURE — 86901 BLOOD TYPING SEROLOGIC RH(D): CPT

## 2023-12-19 PROCEDURE — 99406 BEHAV CHNG SMOKING 3-10 MIN: CPT

## 2023-12-19 RX ORDER — TRAMADOL HYDROCHLORIDE 50 MG/1
1 TABLET ORAL
Qty: 30 | Refills: 0
Start: 2023-12-19 | End: 2023-12-23

## 2023-12-19 RX ORDER — SENNA PLUS 8.6 MG/1
2 TABLET ORAL
Qty: 0 | Refills: 0 | DISCHARGE
Start: 2023-12-19

## 2023-12-19 RX ORDER — ACETAMINOPHEN 500 MG
2 TABLET ORAL
Qty: 0 | Refills: 0 | DISCHARGE
Start: 2023-12-19

## 2023-12-19 RX ORDER — SEMAGLUTIDE 0.68 MG/ML
2.4 INJECTION, SOLUTION SUBCUTANEOUS
Refills: 0 | DISCHARGE

## 2023-12-19 RX ORDER — SODIUM CHLORIDE 9 MG/ML
1000 INJECTION, SOLUTION INTRAVENOUS
Refills: 0 | Status: DISCONTINUED | OUTPATIENT
Start: 2023-12-19 | End: 2023-12-19

## 2023-12-19 RX ORDER — CYCLOBENZAPRINE HYDROCHLORIDE 10 MG/1
1 TABLET, FILM COATED ORAL
Qty: 18 | Refills: 0
Start: 2023-12-19 | End: 2023-12-24

## 2023-12-19 RX ADMIN — CYCLOBENZAPRINE HYDROCHLORIDE 10 MILLIGRAM(S): 10 TABLET, FILM COATED ORAL at 05:46

## 2023-12-19 RX ADMIN — Medication 100 MILLIGRAM(S): at 02:04

## 2023-12-19 RX ADMIN — Medication 5 MILLIGRAM(S): at 05:46

## 2023-12-19 RX ADMIN — TRAMADOL HYDROCHLORIDE 50 MILLIGRAM(S): 50 TABLET ORAL at 00:52

## 2023-12-19 RX ADMIN — Medication 112 MICROGRAM(S): at 05:45

## 2023-12-19 NOTE — PROGRESS NOTE ADULT - SUBJECTIVE AND OBJECTIVE BOX
Ortho Note    Pt comfortable without complaints, pain controlled  Denies CP, SOB, N/V, new numbness/tingling     Vital Signs Last 24 Hrs  T(C): 36.4 (12-19-23 @ 05:14), Max: 36.4 (12-19-23 @ 00:48)  T(F): 97.6 (12-19-23 @ 05:14), Max: 97.6 (12-19-23 @ 00:48)  HR: 97 (12-19-23 @ 05:45) (74 - 97)  BP: 121/76 (12-19-23 @ 05:45) (96/61 - 121/76)  BP(mean): --  RR: 19 (12-19-23 @ 05:14) (18 - 19)  SpO2: 94% (12-19-23 @ 05:14) (94% - 95%)  I&O's Summary    18 Dec 2023 07:01  -  19 Dec 2023 05:55  --------------------------------------------------------  IN: 500 mL / OUT: 1635 mL / NET: -1135 mL        General: Pt Alert and oriented, NAD  DSG C/D/I- x1 drain    RUE  Sensation: SILT C5-T1   Motor:   C5 (deltoid abduction) 5/5   C6 (biceps flexion)  5/5   C7 (triceps extension) 5/5   C8 (finger flexion)  5/5   T1 (interosseous)  5/5    LUE  Sensation: SILT C5-T1  Motor:   C5 (deltoid abduction) 5/5   C6 (biceps flexion)  5/5   C7 (triceps extension) 5/5   C8 (finger flexion)  5/5   T1 (interosseous)  5/5     RLE   Sensation: SILT L2  Motor: L3-L5 no sensation  L2 (hip flexion)  5/5   L3 (knee extension)  5/5   L4 (ankle dorsiflexion)  5/5   L5 (long toe extension) 5/5   S1 (ankle plantar flexion) 5/5      LLE  Sensation: SILT L2-S1  Motor:   L2 (hip flexion)  5/5   L3 (knee extension)  5/5   L4 (ankle dorsiflexion)  5/5   L5 (long toe extension) 5/5   S1 (ankle plantar flexion) 5/5  Reflexes: Normal and symmetric  Negative clonus. Down-going Babinski      A/P: 58yFemale POD#1 s/p Laminectomy, excision of cyst L3-4     - Stable  - Pain Control: IV and PO PRN   - DVT ppx: SCDs  - Post op abx: ancef 2g IV x2   - PT, WBS: WBAT  - Dispo: pend drain removal, pt final clearance    Ortho Pager [phone]     Ortho Note    Pt comfortable without complaints, pain controlled  Denies CP, SOB, N/V, new numbness/tingling     Vital Signs Last 24 Hrs  T(C): 36.4 (12-19-23 @ 05:14), Max: 36.4 (12-19-23 @ 00:48)  T(F): 97.6 (12-19-23 @ 05:14), Max: 97.6 (12-19-23 @ 00:48)  HR: 97 (12-19-23 @ 05:45) (74 - 97)  BP: 121/76 (12-19-23 @ 05:45) (96/61 - 121/76)  BP(mean): --  RR: 19 (12-19-23 @ 05:14) (18 - 19)  SpO2: 94% (12-19-23 @ 05:14) (94% - 95%)  I&O's Summary    18 Dec 2023 07:01  -  19 Dec 2023 05:55  --------------------------------------------------------  IN: 500 mL / OUT: 1635 mL / NET: -1135 mL        General: Pt Alert and oriented, NAD  DSG C/D/I- x1 drain    RUE  Sensation: SILT C5-T1   Motor:   C5 (deltoid abduction) 5/5   C6 (biceps flexion)  5/5   C7 (triceps extension) 5/5   C8 (finger flexion)  5/5   T1 (interosseous)  5/5    LUE  Sensation: SILT C5-T1  Motor:   C5 (deltoid abduction) 5/5   C6 (biceps flexion)  5/5   C7 (triceps extension) 5/5   C8 (finger flexion)  5/5   T1 (interosseous)  5/5     RLE   Sensation: SILT L2  Motor: L3-L5 no sensation  L2 (hip flexion)  5/5   L3 (knee extension)  5/5   L4 (ankle dorsiflexion)  5/5   L5 (long toe extension) 5/5   S1 (ankle plantar flexion) 5/5      LLE  Sensation: SILT L2-S1  Motor:   L2 (hip flexion)  5/5   L3 (knee extension)  5/5   L4 (ankle dorsiflexion)  5/5   L5 (long toe extension) 5/5   S1 (ankle plantar flexion) 5/5  Reflexes: Normal and symmetric  Negative clonus. Down-going Babinski      A/P: 58yFemale POD#1 s/p Laminectomy, excision of cyst L3-4     - Stable  - Pain Control: IV and PO PRN   - DVT ppx: SCDs  - Post op abx: ancef 2g IV x2   - PT, WBS: WBAT  - Dispo: pend drain removal, pt final clearance    Ortho Pager [phone]

## 2023-12-19 NOTE — DISCHARGE NOTE PROVIDER - NSDCCPTREATMENT_GEN_ALL_CORE_FT
PRINCIPAL PROCEDURE  Procedure: Laminectomy, lumbar, one or two levels  Findings and Treatment:

## 2023-12-19 NOTE — CONSULT NOTE ADULT - ASSESSMENT
58F w hypothyroidism, PMR, RLS, Celiac disease w prior spinal surgery, p/w low back pain w radiation into RLE, here for L3-4 Laminectomy w Dr. Oconnor 12/18, for home w HPT    #Post-op state - pain controlled. PPx: SCDs. On bowel regimen and incentive spirometer  #Spinal stenosis   - tylenol 1g q8, gabapentin 800 HS, flexeril 10 q8   - PRN: Oxycodone 5 q4 for moderate pain    #Hypothyroidism - synthroid 112mcg   #PMR - on prednisone 5mg   #Celiac dz - celiac diet  #RLS - on ropinirole 4mg daily  #Tobacco use d/o - smokes 4-5/day    Plan  Counseled on smoking cessation - pt has been cutting down. She is motivated to quit  Optimized - pending clearing PT

## 2023-12-19 NOTE — DISCHARGE NOTE PROVIDER - NSDCMRMEDTOKEN_GEN_ALL_CORE_FT
Claritin 5 mg oral tablet, chewable: 1 tab(s) orally once a day  gabapentin 800 mg oral tablet: 1 tab(s) orally once a day (at bedtime)  Levoxyl 112 mcg (0.112 mg) oral tablet: 1 tab(s) orally once a day  omeprazole 40 mg oral delayed release capsule: 1 cap(s) orally once a day  oxycodone-acetaminophen 5 mg-325 mg oral tablet: 1 tab(s) orally every 6 hours, As Needed -for severe pain MDD:6   phentermine 30 mg oral capsule: 1 cap(s) orally once a day  predniSONE 5 mg oral tablet: 1 tab(s) orally once a day  rOPINIRole 4 mg oral tablet: orally once a day (at bedtime)  Vitamin D3 25 mcg (1000 intl units) oral tablet: 1 tab(s) orally once a day  Wegovy (2.4 mg dose) subcutaneous solution: 2.4 milligram(s) subcutaneously once a week   acetaminophen 500 mg oral tablet: 2 tab(s) orally every 8 hours  Claritin 5 mg oral tablet, chewable: 1 tab(s) orally once a day  cyclobenzaprine 10 mg oral tablet: 1 tab(s) orally every 8 hours MDD: 3  gabapentin 800 mg oral tablet: 1 tab(s) orally once a day (at bedtime)  Levoxyl 112 mcg (0.112 mg) oral tablet: 1 tab(s) orally once a day  omeprazole 40 mg oral delayed release capsule: 1 cap(s) orally once a day  phentermine 30 mg oral capsule: 1 cap(s) orally once a day  rOPINIRole 4 mg oral tablet: orally once a day (at bedtime)  senna leaf extract oral tablet: 2 tab(s) orally once a day (at bedtime)  traMADol 50 mg oral tablet: 1 tab(s) orally every 4 to 6 hours as needed for Severe Pain (7 - 10) MDD: 6  Vitamin D3 25 mcg (1000 intl units) oral tablet: 1 tab(s) orally once a day

## 2023-12-19 NOTE — DISCHARGE NOTE NURSING/CASE MANAGEMENT/SOCIAL WORK - PATIENT PORTAL LINK FT
You can access the FollowMyHealth Patient Portal offered by [facility] by registering at the following website: [email or web address]. By joining [employer]’s FollowMyHealth portal, you will also be able to view your health information using other applications (apps) compatible with our system. You can access the FollowMyHealth Patient Portal offered by [facility] by registering at the following website: [email or web address]. By joining [email or web address]’s FollowMyHealth portal, you will also be able to view your health information using other applications (apps) compatible with our system.

## 2023-12-19 NOTE — DISCHARGE NOTE PROVIDER - HOSPITAL COURSE
Admit to Orthopaedics 12/18/23 for Right L2-L3 Laminectomy with Cyst Excision   Perioperative Antibiotics  DVT prophylaxis  Physical Therapy  Pain Management   Medical Co Management Admit to Orthopaedics 12/18/23 for Right L2-L3 Laminectomy with Cyst Excision   Perioperative Antibiotics  DVT prophylaxis  Physical Therapy  Pain Management   Medical Co Management     You have a Hemovac drain that was removed when it was at an appropriate amount.

## 2023-12-19 NOTE — DISCHARGE NOTE NURSING/CASE MANAGEMENT/SOCIAL WORK - NSDCPEFALRISK_GEN_ALL_CORE
For information on Fall & Injury Prevention, visit: [email or web address] OR  [email or web address] OR  https://www.cdc.gov/steadi/patient.html For information on Fall & Injury Prevention, visit: [email or web address] OR  [email or web address] OR  https://www.cdc.gov/steadi/patient.html

## 2023-12-19 NOTE — CONSULT NOTE ADULT - SUBJECTIVE AND OBJECTIVE BOX
HPI "58F with low back pain x chronic despite conservative therapies for her symptoms. Of note had L4-5 spinal fusion in 2021 but has had continued pain. Complains of back pain that radiates down her right lower extremity (states has occasional numbness/tingling down her right lower extremity). Denies use of assistive walking devices or history of DVT. States takes oxycodone at night occasionally for her pain.     Presents for elective laminectomy with excision of intracanalicular cyst with a subarticular facet cyst right L3-L4. (15 Dec 2023 10:43)"    58F w hypothyroidism, PMR, RLS, Celiac disease w prior spinal surgery, p/w low back pain w radiation into RLE, here for L3-4 Laminectomy w Dr. Oconnor 12/18, for home w HPT    Pt reports pain in low back w radiation down RLE. States today pain in back is controlled w PRN pain medications. Feels radiating pain is improved. No fever, chest pain, dyspnea. Eating wo N/V/Abd pain. +flatus. Voiding. No fevers.     ROS: 12 point ROS reviewed and otherwise negative per HPI  FH: No DVT/PE   SH: smokes 4-5 cigarettes daily           PAST MEDICAL & SURGICAL HISTORY:  Spinal stenosis of lumbar region      Hypothyroid      Fibromyalgia      GERD (gastroesophageal reflux disease)      Celiac disease      PMR (polymyalgia rheumatica)      Complete rotator cuff tear  left 2017      Elective surgery  ankle surgery x 3      H/O laminectomy      H/O spinal fusion      Home Meds: Home Medications:  Claritin 5 mg oral tablet, chewable: 1 tab(s) orally once a day (18 Dec 2023 07:04)  gabapentin 800 mg oral tablet: 1 tab(s) orally once a day (at bedtime) (18 Dec 2023 07:04)  Levoxyl 112 mcg (0.112 mg) oral tablet: 1 tab(s) orally once a day (18 Dec 2023 07:04)  omeprazole 40 mg oral delayed release capsule: 1 cap(s) orally once a day (18 Dec 2023 07:04)  phentermine 30 mg oral capsule: 1 cap(s) orally once a day (18 Dec 2023 07:04)  predniSONE 5 mg oral tablet: 1 tab(s) orally once a day (18 Dec 2023 07:04)  rOPINIRole 4 mg oral tablet: orally once a day (at bedtime) (18 Dec 2023 07:04)  Vitamin D3 25 mcg (1000 intl units) oral tablet: 1 tab(s) orally once a day (18 Dec 2023 07:04)  Wegovy (2.4 mg dose) subcutaneous solution: 2.4 milligram(s) subcutaneously once a week (18 Dec 2023 07:04)    Allergies: Allergies    clindamycin (Pruritus)  penicillin (Rash)  wheat (Diarrhea)    Intolerances      Soc:   Advanced Directives: Presumed Full Code     CURRENT MEDICATIONS:   --------------------------------------------------------------------------------------  Neurologic Medications  acetaminophen     Tablet .. 650 milliGRAM(s) Oral every 4 hours PRN Temp greater or equal to 38C (100.4F), Mild Pain (1 - 3)  cyclobenzaprine 10 milliGRAM(s) Oral every 8 hours  gabapentin 800 milliGRAM(s) Oral at bedtime  HYDROmorphone  Injectable 0.5 milliGRAM(s) IV Push every 4 hours PRN For breakthrough pain  HYDROmorphone  Injectable 0.5 milliGRAM(s) IV Push every 15 minutes PRN For breakthrough pain  ondansetron Injectable 4 milliGRAM(s) IV Push every 6 hours PRN Nausea and/or Vomiting  oxyCODONE    IR 5 milliGRAM(s) Oral every 4 hours PRN Moderate Pain (4 - 6)  rOPINIRole 4 milliGRAM(s) Oral at bedtime  traMADol 50 milliGRAM(s) Oral every 4 hours PRN Severe Pain (7 - 10)    Respiratory Medications    Cardiovascular Medications    Gastrointestinal Medications  lactated ringers. 1000 milliLiter(s) IV Continuous <Continuous>  polyethylene glycol 3350 17 Gram(s) Oral daily  senna 2 Tablet(s) Oral at bedtime    Genitourinary Medications    Hematologic/Oncologic Medications    Antimicrobial/Immunologic Medications    Endocrine/Metabolic Medications  levothyroxine 112 MICROGram(s) Oral daily  predniSONE   Tablet 5 milliGRAM(s) Oral daily    Topical/Other Medications    --------------------------------------------------------------------------------------    VITAL SIGNS, INS/OUTS (last 24 hours):  --------------------------------------------------------------------------------------  ICU Vital Signs Last 24 Hrs  T(C): 36.9 (19 Dec 2023 08:51), Max: 36.9 (19 Dec 2023 08:51)  T(F): 98.5 (19 Dec 2023 08:51), Max: 98.5 (19 Dec 2023 08:51)  HR: 93 (19 Dec 2023 08:51) (74 - 97)  BP: 105/77 (19 Dec 2023 08:51) (96/61 - 121/76)  BP(mean): --  ABP: --  ABP(mean): --  RR: 16 (19 Dec 2023 08:51) (16 - 19)  SpO2: 95% (19 Dec 2023 08:51) (94% - 99%)    O2 Parameters below as of 19 Dec 2023 08:51  Patient On (Oxygen Delivery Method): room air          I&O's Summary    18 Dec 2023 07:01  -  19 Dec 2023 07:00  --------------------------------------------------------  IN: 500 mL / OUT: 1635 mL / NET: -1135 mL    19 Dec 2023 07:01  -  19 Dec 2023 14:45  --------------------------------------------------------  IN: 0 mL / OUT: 30 mL / NET: -30 mL      --------------------------------------------------------------------------------------    EXAM:  GEN: female in NAD on RA  HEENT: NC/AT, MMM  CV: RRR, nml S1S2, no murmurs  PULM: nml effort, CTAB  ABD: Soft, non-distended, NABS, non-tender  NEURO  A/O x3, moving all extremities, lumbar dressing c/d/i. plantarflex/ext 5/5 b/l. Sensation intact  PSYCH: Appropriate        LABS  --------------------------------------------------------------------------------------  Labs:  CAPILLARY BLOOD GLUCOSE                              11.8   10.35 )-----------( 279      ( 19 Dec 2023 10:16 )             36.6         12-19    142  |  105  |  8   ----------------------------<  100<H>  5.2   |  26  |  0.63      Calcium: 9.1 mg/dL (12-19-23 @ 09:40)      LFTs:         Coags:            Urinalysis Basic - ( 19 Dec 2023 09:40 )    Color: x / Appearance: x / SG: x / pH: x  Gluc: 100 mg/dL / Ketone: x  / Bili: x / Urobili: x   Blood: x / Protein: x / Nitrite: x   Leuk Esterase: x / RBC: x / WBC x   Sq Epi: x / Non Sq Epi: x / Bacteria: x          --------------------------------------------------------------------------------------    OTHER LABS    IMAGING RESULTS  ****************

## 2023-12-19 NOTE — DISCHARGE NOTE PROVIDER - NSDCFUADDINST_GEN_ALL_CORE_FT
ACTIVITY:   - No extreme bending, extending, turning, twisting, or straining. No strenuous activity, heavy lifting, driving or returning to work until cleared by your surgeon.     DRESSING/SHOWERING:    (STAPLES/SUTURES/STERI-STRIPS)   -Change dressing daily until post-op day 5. Sponge bathe until post-op day 5 then may take full shower. Once dressing removed keep incision clean and dry. Do not pick at your incision. Do not apply creams, ointments or oils to your incision until cleared by your surgeon. Do not soak your incision in sitting water (ie tubs, pools, lakes, etc.) until cleared by your surgeon.      MEDICATION/ANTICOAGULATION:   - You have been prescribed medications for pain:     - Tylenol (Acetaminophen) for mild to moderate pain. Do not exceed 3,000mg daily.     - For more severe pain, you may continue to take the Tylenol with the addition of narcotic pain medication. Take this medication as prescribed. Do not take more than prescribed. Note that this medication may cause drowsiness or dizziness. Do not operate machinery. This medication may cause constipation.   - If you have been prescribed a muscle relaxer, take this medication as needed for muscle spasm. Follow instructions on bottle.   - Try to have regular bowel movements. Take stool softener or laxative if necessary. You may wish to take Miralax daily until you have regular bowel movements.    - Do not take antiinflammatories (Aleve, Advil, Naproxen, Ibuprofen, etc.) until cleared by your surgeon. Tylenol is not an anti-inflammatory and okay to take (see above).   - If you have a pain management physician, please follow-up with them postoperatively.    - If you experience any negative side effects of your medications, please call your surgeon's office to discuss.     FOLLOW UP:   - Call to schedule an appt with Dr. Oconnor for follow up.    - Please follow-up with your primary care physician or any other specialist you see postoperatively, if needed.    - Contact your doctor or go to the emergency room if you experience: fever greater than 101.5, chills, chest pain, difficulty breathing, redness or excessive drainage around the incision, other concerns.

## 2023-12-19 NOTE — DISCHARGE NOTE PROVIDER - CARE PROVIDER_API CALL
Vadim Oconnor  Orthopaedic Surgery  [address]  [place] [address]  Phone: [phone]  Fax: [phone]  Follow Up Time:    Vadim Oconnor  Orthopaedic Surgery  [address]  [place] [address]  Phone: [phone]  Fax: [phone]  Follow Up Time:

## 2023-12-20 ENCOUNTER — NON-APPOINTMENT (OUTPATIENT)
Age: 58
End: 2023-12-20

## 2023-12-20 ENCOUNTER — TRANSCRIPTION ENCOUNTER (OUTPATIENT)
Age: 58
End: 2023-12-20

## 2023-12-22 RX ORDER — PHENTERMINE HYDROCHLORIDE 15 MG/1
15 CAPSULE ORAL
Qty: 30 | Refills: 0 | Status: DISCONTINUED | COMMUNITY
Start: 2023-03-28 | End: 2023-12-22

## 2023-12-22 RX ORDER — PREDNISONE 5 MG/1
5 TABLET ORAL
Qty: 180 | Refills: 3 | Status: DISCONTINUED | COMMUNITY
Start: 2019-03-08 | End: 2023-12-22

## 2023-12-22 RX ORDER — TIZANIDINE 2 MG/1
2 TABLET ORAL
Qty: 45 | Refills: 1 | Status: DISCONTINUED | COMMUNITY
Start: 2023-09-21 | End: 2023-12-22

## 2023-12-23 DIAGNOSIS — M71.38 OTHER BURSAL CYST, OTHER SITE: ICD-10-CM

## 2023-12-23 DIAGNOSIS — G25.81 RESTLESS LEGS SYNDROME: ICD-10-CM

## 2023-12-23 DIAGNOSIS — Z79.890 HORMONE REPLACEMENT THERAPY: ICD-10-CM

## 2023-12-23 DIAGNOSIS — M54.16 RADICULOPATHY, LUMBAR REGION: ICD-10-CM

## 2023-12-23 DIAGNOSIS — Z79.52 LONG TERM (CURRENT) USE OF SYSTEMIC STEROIDS: ICD-10-CM

## 2023-12-23 DIAGNOSIS — K21.9 GASTRO-ESOPHAGEAL REFLUX DISEASE WITHOUT ESOPHAGITIS: ICD-10-CM

## 2023-12-23 DIAGNOSIS — M35.3 POLYMYALGIA RHEUMATICA: ICD-10-CM

## 2023-12-23 DIAGNOSIS — F17.210 NICOTINE DEPENDENCE, CIGARETTES, UNCOMPLICATED: ICD-10-CM

## 2023-12-23 DIAGNOSIS — M48.061 SPINAL STENOSIS, LUMBAR REGION WITHOUT NEUROGENIC CLAUDICATION: ICD-10-CM

## 2023-12-23 DIAGNOSIS — Z79.899 OTHER LONG TERM (CURRENT) DRUG THERAPY: ICD-10-CM

## 2023-12-23 DIAGNOSIS — Z98.1 ARTHRODESIS STATUS: ICD-10-CM

## 2023-12-23 DIAGNOSIS — Z79.85 LONG-TERM (CURRENT) USE OF INJECTABLE NON-INSULIN ANTIDIABETIC DRUGS: ICD-10-CM

## 2023-12-23 DIAGNOSIS — K90.0 CELIAC DISEASE: ICD-10-CM

## 2023-12-23 DIAGNOSIS — Z88.1 ALLERGY STATUS TO OTHER ANTIBIOTIC AGENTS STATUS: ICD-10-CM

## 2023-12-23 DIAGNOSIS — Z88.0 ALLERGY STATUS TO PENICILLIN: ICD-10-CM

## 2023-12-23 DIAGNOSIS — M54.50 LOW BACK PAIN, UNSPECIFIED: ICD-10-CM

## 2023-12-23 DIAGNOSIS — E03.9 HYPOTHYROIDISM, UNSPECIFIED: ICD-10-CM

## 2023-12-23 DIAGNOSIS — M79.7 FIBROMYALGIA: ICD-10-CM

## 2023-12-27 PROBLEM — M35.3 POLYMYALGIA RHEUMATICA: Chronic | Status: ACTIVE | Noted: 2023-12-15

## 2023-12-30 PROBLEM — M35.3 POLYMYALGIA RHEUMATICA: Status: ACTIVE | Noted: 2019-01-18

## 2023-12-30 PROBLEM — M47.817 LUMBOSACRAL SPONDYLOSIS WITHOUT MYELOPATHY: Status: ACTIVE | Noted: 2019-12-03

## 2023-12-30 PROBLEM — E55.9 VITAMIN D DEFICIENCY: Status: ACTIVE | Noted: 2022-01-18

## 2023-12-30 PROBLEM — M19.91 PRIMARY OSTEOARTHRITIS: Status: ACTIVE | Noted: 2019-01-18

## 2023-12-30 PROBLEM — G25.81 RESTLESS LEGS SYNDROME: Status: ACTIVE | Noted: 2019-01-18

## 2023-12-30 PROBLEM — M79.7 FIBROMYALGIA: Status: ACTIVE | Noted: 2019-04-09

## 2023-12-30 RX ORDER — GABAPENTIN 800 MG/1
800 TABLET, COATED ORAL AT BEDTIME
Refills: 0 | Status: ACTIVE | COMMUNITY
Start: 2019-11-14

## 2023-12-30 RX ORDER — ROPINIROLE 4 MG/1
4 TABLET, FILM COATED ORAL
Qty: 90 | Refills: 1 | Status: ACTIVE | COMMUNITY
Start: 2020-01-10

## 2023-12-30 NOTE — HISTORY OF PRESENT ILLNESS
[FreeTextEntry1] : She will have surgery next week to remove a cyst pushing on her L3/L4 nerves which is causing pain down her right leg.  Dr. Ortega aspirated it a few times, but it recurred.  She is taking Gabapentin 800 mg at night (600 + 200), Ropinirole 4 mg, Prednisone 5 mg.

## 2024-01-16 ENCOUNTER — APPOINTMENT (OUTPATIENT)
Dept: ORTHOPEDIC SURGERY | Facility: CLINIC | Age: 59
End: 2024-01-16
Payer: COMMERCIAL

## 2024-01-16 DIAGNOSIS — M54.16 RADICULOPATHY, LUMBAR REGION: ICD-10-CM

## 2024-01-16 PROCEDURE — 99214 OFFICE O/P EST MOD 30 MIN: CPT

## 2024-01-17 NOTE — HISTORY OF PRESENT ILLNESS
[de-identified] : Breanna Nay is here follow up status post revision decompression L3 -4. She's getting right sided hip pain and tenderness to palpation.

## 2024-01-17 NOTE — DISCUSSION/SUMMARY
[de-identified] : Plan:   I recommended MRI of the right hip and lumbosacral spine, follow up with the aforementioned films. If I am unable to determine the source of a right sided hip pain, I will refer her to my colleague, Dr. Stephenson who has seen and treated the patient in the past.

## 2024-01-17 NOTE — END OF VISIT
[FreeTextEntry3] : I Kristin Robin, acting as scribe, attest that this documentation has been prepared under the direction and in the presence of Provider Vadim Oconnor MD.   I was physically present during the service to the patient and/or personally examined the patient and I was directly involved in the management plan and recommendations of the care provided to the patient.   I Dr. Oconnor, reviewed the history, the physical exam, and plan as documented by the PA. The documentation recorded by the PA, in my presence, accurately reflects the service I personally performed, and the decisions made by me with my edits as appropriate.  Vadim Oconnor MD

## 2024-01-25 ENCOUNTER — TRANSCRIPTION ENCOUNTER (OUTPATIENT)
Age: 59
End: 2024-01-25

## 2024-01-25 ENCOUNTER — APPOINTMENT (OUTPATIENT)
Dept: BARIATRICS/WEIGHT MGMT | Facility: CLINIC | Age: 59
End: 2024-01-25
Payer: COMMERCIAL

## 2024-01-25 VITALS
RESPIRATION RATE: 16 BRPM | HEART RATE: 110 BPM | SYSTOLIC BLOOD PRESSURE: 118 MMHG | OXYGEN SATURATION: 99 % | WEIGHT: 150 LBS | DIASTOLIC BLOOD PRESSURE: 72 MMHG | HEIGHT: 63 IN | BODY MASS INDEX: 26.58 KG/M2

## 2024-01-25 PROCEDURE — 99214 OFFICE O/P EST MOD 30 MIN: CPT

## 2024-01-25 RX ORDER — SEMAGLUTIDE 1 MG/.5ML
1 INJECTION, SOLUTION SUBCUTANEOUS
Qty: 1 | Refills: 2 | Status: COMPLETED | COMMUNITY
Start: 2023-03-28 | End: 2024-01-25

## 2024-01-25 RX ORDER — PHENTERMINE HYDROCHLORIDE 15 MG/1
15 CAPSULE ORAL
Qty: 30 | Refills: 0 | Status: COMPLETED | COMMUNITY
Start: 2023-03-02 | End: 2024-01-25

## 2024-01-25 RX ORDER — SEMAGLUTIDE 0.5 MG/.5ML
0.5 INJECTION, SOLUTION SUBCUTANEOUS
Qty: 1 | Refills: 0 | Status: COMPLETED | COMMUNITY
Start: 2023-03-13 | End: 2024-01-25

## 2024-01-25 RX ORDER — PHENTERMINE HYDROCHLORIDE 8 MG/1
8 TABLET ORAL
Qty: 30 | Refills: 0 | Status: COMPLETED | COMMUNITY
Start: 2023-03-28 | End: 2024-01-25

## 2024-01-25 RX ORDER — SEMAGLUTIDE 0.25 MG/.5ML
0.25 INJECTION, SOLUTION SUBCUTANEOUS
Qty: 1 | Refills: 1 | Status: COMPLETED | COMMUNITY
Start: 2023-02-09 | End: 2024-01-25

## 2024-01-25 RX ORDER — PHENTERMINE HYDROCHLORIDE 8 MG/1
8 TABLET ORAL
Qty: 30 | Refills: 0 | Status: COMPLETED | COMMUNITY
Start: 2023-02-09 | End: 2024-01-25

## 2024-01-25 RX ORDER — SEMAGLUTIDE 1.7 MG/.75ML
1.7 INJECTION, SOLUTION SUBCUTANEOUS
Qty: 1 | Refills: 1 | Status: COMPLETED | COMMUNITY
Start: 2023-04-25 | End: 2024-01-25

## 2024-02-12 ENCOUNTER — APPOINTMENT (OUTPATIENT)
Dept: FAMILY MEDICINE | Facility: CLINIC | Age: 59
End: 2024-02-12
Payer: COMMERCIAL

## 2024-02-12 VITALS
RESPIRATION RATE: 16 BRPM | SYSTOLIC BLOOD PRESSURE: 135 MMHG | WEIGHT: 150 LBS | BODY MASS INDEX: 26.58 KG/M2 | OXYGEN SATURATION: 100 % | HEART RATE: 105 BPM | DIASTOLIC BLOOD PRESSURE: 75 MMHG | HEIGHT: 63 IN

## 2024-02-12 DIAGNOSIS — G89.4 CHRONIC PAIN SYNDROME: ICD-10-CM

## 2024-02-12 PROCEDURE — 99213 OFFICE O/P EST LOW 20 MIN: CPT

## 2024-02-15 PROBLEM — G89.4 CHRONIC PAIN DISORDER: Status: ACTIVE | Noted: 2023-09-21

## 2024-02-15 NOTE — HEALTH RISK ASSESSMENT
[0] : 2) Feeling down, depressed, or hopeless: Not at all (0) [PHQ-2 Negative - No further assessment needed] : PHQ-2 Negative - No further assessment needed [[id number]] : 0

## 2024-02-15 NOTE — PHYSICAL EXAM
[No Respiratory Distress] : no respiratory distress  [No Accessory Muscle Use] : no accessory muscle use [Normal Rate] : normal rate  [Normal Gait] : normal gait [Normal] : affect was normal and insight and judgment were intact [de-identified] : + Multiple areas of bruising/ecchymosis with skin tearing in various stages of healing on bilateral upper extremities

## 2024-02-15 NOTE — HISTORY OF PRESENT ILLNESS
[FreeTextEntry1] : Increased bruising and bleeding [de-identified] : 58-year-old female presenting with increased bruising and bleeding.  Although she states it is largely on her extremities she believes this is because she tends to bump those more.  If she does bump some on her trunk she is does get a bruise.  This has been going on for a long while but is now starting to concern her as her skin is also tearing easily

## 2024-02-20 NOTE — PATIENT PROFILE ADULT - FALLEN IN THE PAST
Biopsy samples obtained and given to pathology
Initial CT scan done for needle placement
Oxygen applied at 2L via NC for sedation
Patient is in the prone position.   The body was positioned using the following devices: safety strap and blanket.  Procedure performed on a bed/cart.
Patient is in the prone position.   The body was positioned using the following devices: safety strap.  The head was positioned using the following devices: regular pillow.
Patient is in the supine position.
Patient is in the supine position.
Pt tolerated without difficulty, see MD dictation for full detail.
Pt tolerated without difficulty, see MD dictation for full detail. 
Site marked at right lower back with CT guidance
The ECG revealed a sinus rhythm. The ECG rate was 63 bpm.
The ECG revealed a sinus rhythm. The ECG rate was 65 bpm.
The ECG revealed a sinus rhythm. The ECG rate was 66 bpm.
The ECG revealed a sinus rhythm. The ECG rate was 69 bpm.
The ECG revealed a sinus rhythm. The ECG rate was 77 bpm.
The site was marked. Prepped with: ChloraPrep. The patient was draped. Right lower back for medical renal biopsy
The site was marked. Prepped: right chest and right neck. Prepped with: ChloraPrep. The patient was draped. At [facility]
The site was marked. Prepped: right chest and right neck. Prepped with: ChloraPrep. The patient was draped. Right anterior upper chest/right lower neck
The site was marked. Prepped: right chest. Prepped with: ChloraPrep. The patient was draped. Right anterior upper chest, existing catheter
no

## 2024-02-22 RX ORDER — LEVOTHYROXINE SODIUM 0.11 MG/1
112 TABLET ORAL
Qty: 90 | Refills: 2 | Status: ACTIVE | COMMUNITY
Start: 2020-03-10 | End: 1900-01-01

## 2024-03-07 NOTE — ASU PATIENT PROFILE, ADULT - AS SC BRADEN ACTIVITY
Sunscreen Recommendations: It was recommend that sunscreens be applied on a daily basis. This should be applied an hour before exposure and then every two hours if out for an extended period of time. SPF 30 or greater is recommended. There are mineral sunscreens and chemical sun screens. The mineral sunscreens sometimes are difficult to use due to the white color. Top rated sunscreen include La Roche Posay, Blue Lizzard, Elta MD and Neutrogena Ultra [place].
Detail Level: Simple
Sunscreen Recommendation Label Override: SPF 30
Topical Retinoids Recommendations: I recommend applying a thin layer to the entire face. They will gurmeet up on its use. A handout was given them for the gurmeet. Potential side effects were discussed.
Nicotinamide Supplementation Recommendations: Take 500 mg twice a day. This can be purchased over the counter. Heliocare advanced formula was recommended.
(4) walks frequently

## 2024-03-27 ENCOUNTER — APPOINTMENT (OUTPATIENT)
Dept: HEMATOLOGY ONCOLOGY | Facility: CLINIC | Age: 59
End: 2024-03-27

## 2024-04-02 ENCOUNTER — APPOINTMENT (OUTPATIENT)
Dept: FAMILY MEDICINE | Facility: CLINIC | Age: 59
End: 2024-04-02
Payer: COMMERCIAL

## 2024-04-02 VITALS
BODY MASS INDEX: 27.46 KG/M2 | SYSTOLIC BLOOD PRESSURE: 98 MMHG | HEART RATE: 110 BPM | WEIGHT: 155 LBS | OXYGEN SATURATION: 99 % | RESPIRATION RATE: 16 BRPM | DIASTOLIC BLOOD PRESSURE: 60 MMHG | HEIGHT: 63 IN

## 2024-04-02 DIAGNOSIS — J06.9 ACUTE UPPER RESPIRATORY INFECTION, UNSPECIFIED: ICD-10-CM

## 2024-04-02 PROCEDURE — 99213 OFFICE O/P EST LOW 20 MIN: CPT

## 2024-04-02 NOTE — HISTORY OF PRESENT ILLNESS
[FreeTextEntry8] : 58-year-old female presenting with concerns over a URI that started 4 days ago.  Patient has had a cough with yellow to green phlegm.  Patient is a current smoker.

## 2024-04-02 NOTE — PHYSICAL EXAM
[No Respiratory Distress] : no respiratory distress  [No Accessory Muscle Use] : no accessory muscle use [Normal Gait] : normal gait [Normal] : affect was normal and insight and judgment were intact [de-identified] : Diffuse rhonchi

## 2024-04-03 ENCOUNTER — APPOINTMENT (OUTPATIENT)
Dept: BARIATRICS/WEIGHT MGMT | Facility: CLINIC | Age: 59
End: 2024-04-03
Payer: COMMERCIAL

## 2024-04-03 VITALS — BODY MASS INDEX: 27.64 KG/M2 | WEIGHT: 156 LBS | HEIGHT: 63 IN

## 2024-04-03 DIAGNOSIS — R73.09 OTHER ABNORMAL GLUCOSE: ICD-10-CM

## 2024-04-03 DIAGNOSIS — E78.00 PURE HYPERCHOLESTEROLEMIA, UNSPECIFIED: ICD-10-CM

## 2024-04-03 PROCEDURE — 99214 OFFICE O/P EST MOD 30 MIN: CPT

## 2024-04-03 RX ORDER — TRAMADOL HYDROCHLORIDE 50 MG/1
50 TABLET, COATED ORAL
Refills: 0 | Status: COMPLETED | COMMUNITY
Start: 2023-12-22 | End: 2024-04-03

## 2024-04-03 NOTE — ASSESSMENT
[FreeTextEntry1] : Dietary- recommend cutting out simple carbs to help decrease the habit of having them and to decrease cravings for simple sugars. Educated on how our blood sugar rises quickly and falls after eating simple sugars and makes us crave more Exercise- incorporate activity as tolerated  Med- can try changing over to zepbound. Continue wegovy 2.4 mg weekly until able to get the new medicine. Educated on how this medicine works differently than wegovy  RTO in 1month for f/u

## 2024-04-03 NOTE — HISTORY OF PRESENT ILLNESS
[Other Location: e.g. School (Enter Location, City,State)___] : at [unfilled], at the time of the visit. [Other Location: e.g. Home (Enter Location, City,State)___] : at [unfilled] [Verbal consent obtained from patient] : the patient, [unfilled] [FreeTextEntry1] : 57 y/o female here for medical weight loss consultation Concerned that she has gained 50 lbs since having back issues and during this COVID 19 pandemic. Has tried dietary modification, but is struggling with staying motivated and frustrated that she cannot make the right choices with food or exercise at this time.  C/o low energy Has tried phentermine in the past with good success, lowest adult weight 120 lbs, current weight approximately 190 lbs. Denies trying any additional medication for weight loss.  Medical Hx: fibromyalgia, GERD, Celiac, hypothyroidism, colon polyp, + post menopausal, lumbar radiculopathy Surgical Hx: Lumbar lami with fusion 3 months ago- clear to do light exercise at this time (walking, stationary bike), rotator cuff repair, ankle surgery  Food Recall: B- caramel M&Ms, L-tator tots, cheese, onions, tomatoes, ranch dressing, Snacks- cookies, potato chips. 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 Taking 0.25 mg SQ weekly, denies side effects. Has been meal prepping for work- brings greek yogurt, hummus with celery, hard boiled eggs. Snacks on popcorn or strawberries, avoiding sweets. Water intake- improved, able to get in 64 ounces some days. Dancing at home for exercise, plans to start walking outside once the weather improves. Labs done on 1/20/22- HgBA1c 6.0%, Cholesterol, LDL & Triglycerides elevated.  3/22/22: Lost 7 lbs. Taking Wegovy 0.5 mg SQ weekly, + fatigue, +nausea, + emesis 3 times per week after laying down. Continues to eat small portions (strawberries, hummus with rice crackers, greek yogurt, salads), but feels full quickly. Took last dose 2 days ago. Water intake- inadequate. Unable to exercise due to fatigue. Has taken phentermine in the past with good success.   4/21/22: Lost 3 lbs, down 23 lbs total. Discontinued Wegovy and has been taking Phentermine 15-30 mg daily, tolerating well. Denies fatigue, but able to fall asleep at night. Reports increased cravings and increased episodes of emotional eating since discontinuing Wegovy. Walking outdoors 2 times per week, plans on getting a treadmill and has a goal to walk daily for 30 mins. Would like to re-try Wegovy or GLP1 to help with cravings.    5/24/22: Lost 3 lb, down 26 lb total. Tolerating phentermine 15 mg daily & saxenda 2.4 mg SQ daily. Notices some fatigue but tolerating better than Wegovy and feels that the phentermine helps with this. Water intake- sufficient, continues to work on. Walking for exercise 1-3 miles 2 x per week, doesn't like the treadmill. Reports good appetite suppression and portion control.   7/7/22: Lost 5 lbs, down 31 lbs total. Tolerating phentermine 15 mg & saxenda daily. Noticing some fatigue and increased cravings in the afternoon. Walks for exercise when she has energy, but often feels tired in the afternoon. Reviewed recent lab results.   8/11/22: Lost 1 lb, 32 lbs total. Tolerating phentermine 30 mg & saxenda 3 mg Daily. Reports increased cravings and hunger while at work. Walks for exercise- would like to join [employer] if one opens locally. Interested in trying Wegovy instead of saxenda to see if it helps with hunger. Tries to focus on eating healthy food throughout the day, but struggles at work.   9/19/22: Lost 2 lbs, 35 lbs total. Tolerating phentermine 30 mg & saxenda 3 mg daily. Walking for exercise. Eating small portions and healthier meals.Cut back on smoking, reports that smoking is more social.   2/9/23: Gained 37 lbs since we last saw each other, discontinued all medical weight management medications and has been struggling with cravings and hunger throughout the day. Physical activity limited due to recent Left arm rotator cuff surgery. Upset and frustrated with herself plus struggling with fatigue at work. WOuld like to restart medication treatment at this time  3/2/23: Feeling better, lost 10 lbs. Tolerating medication, denies side effects but continues to feel tired throughout the day. Eating healthier dietary options and has been working on exercises post shoulder surgery   3/28/23: Lost 3 lbs, down 13 lbs total. Tolerating wegovy .5 mg weekly, has some constipation. Continues to struggle with cravings/ hunger so taking phentermine as well to help with hunger and fatigue. Continues to do PT for shoulder. Removed all unhealthy snacks from house and eating healthier meals throughout the day.  4/25/23: Down 8 lbs, lost 21 lbs total since starting back on treatment plan. Continues to walk 15-20 mins most days and does PT 2 x per week. Tolerating wegovy 1 mg weekly & phentermine 23 mg daily. Feels nauseous if she overeats but otherwise denies side effects. Eating healthy diet for the most part. Had Christy V-series abdominal treatment done, which she felt was helpful. Working on decreasing smoking- down to 3 cigarrettes per day. Continues to struggle with high stress level related to work.   5/25/23: Down 26 lbs total. Sleeping better, feels well overall. good energy. Eating 2 meals per day with protein and vegetables. Walking for exercise.   6/21/23: Down 32 lbs total. Continues to walk for exercise, sleeping well. Eating better overall. Gets tired, but feels that phentermine helps with fatigue. Checked BP and pulse at the office earlier today.  9/26/23: Down 34 lbs total. Eating 2 meals with protein, drinking more water. Feels well overall, just struggling with back pain and increased stress.  1/25/24: Maintained weight. Continues to focus on eating healthier options, but enjoys coffee sweet and has been emotionally eating lately due to stress- best friend has been sick. Continues to struggle with back pain after surgical repair, seeing ortho next. In addition struggles with pain from RA.  4/3/24: Gained 5-6 lbs over the last few months. Feels that the wegovy doesn't help as well anymore, noticing more cravings for sweets/ candy. Exercise limited by fibromyalgia and recently tore hamstring. Drinking 40 ounces a day. Has been receiving treatment for bladder issues and doing PT for this. Continues to eat healthy diet but snacking on candy more lately.

## 2024-04-17 ENCOUNTER — APPOINTMENT (OUTPATIENT)
Dept: INTERNAL MEDICINE | Facility: CLINIC | Age: 59
End: 2024-04-17

## 2024-04-17 ENCOUNTER — OUTPATIENT (OUTPATIENT)
Dept: OUTPATIENT SERVICES | Facility: HOSPITAL | Age: 59
LOS: 1 days | End: 2024-04-17

## 2024-04-17 ENCOUNTER — TRANSCRIPTION ENCOUNTER (OUTPATIENT)
Age: 59
End: 2024-04-17

## 2024-04-17 ENCOUNTER — NON-APPOINTMENT (OUTPATIENT)
Age: 59
End: 2024-04-17

## 2024-04-17 DIAGNOSIS — Z41.9 ENCOUNTER FOR PROCEDURE FOR PURPOSES OTHER THAN REMEDYING HEALTH STATE, UNSPECIFIED: Chronic | ICD-10-CM

## 2024-04-26 ENCOUNTER — TRANSCRIPTION ENCOUNTER (OUTPATIENT)
Age: 59
End: 2024-04-26

## 2024-06-04 RX ORDER — SEMAGLUTIDE 2.4 MG/.75ML
2.4 INJECTION, SOLUTION SUBCUTANEOUS
Qty: 3 | Refills: 1 | Status: COMPLETED | COMMUNITY
Start: 2023-06-21 | End: 2024-06-04

## 2024-06-04 RX ORDER — PEN NEEDLE, DIABETIC 32GX 5/32"
32G X 4 MM NEEDLE, DISPOSABLE MISCELLANEOUS
Qty: 90 | Refills: 0 | Status: COMPLETED | COMMUNITY
Start: 2022-04-21 | End: 2024-06-04

## 2024-06-27 ENCOUNTER — LABORATORY RESULT (OUTPATIENT)
Age: 59
End: 2024-06-27

## 2024-06-27 ENCOUNTER — APPOINTMENT (OUTPATIENT)
Dept: HEMATOLOGY ONCOLOGY | Facility: CLINIC | Age: 59
End: 2024-06-27
Payer: COMMERCIAL

## 2024-06-27 ENCOUNTER — TRANSCRIPTION ENCOUNTER (OUTPATIENT)
Age: 59
End: 2024-06-27

## 2024-06-27 VITALS
TEMPERATURE: 98.4 F | HEART RATE: 102 BPM | DIASTOLIC BLOOD PRESSURE: 72 MMHG | OXYGEN SATURATION: 95 % | WEIGHT: 151 LBS | BODY MASS INDEX: 26.75 KG/M2 | RESPIRATION RATE: 16 BRPM | HEIGHT: 63 IN | SYSTOLIC BLOOD PRESSURE: 114 MMHG

## 2024-06-27 DIAGNOSIS — R23.3 SPONTANEOUS ECCHYMOSES: ICD-10-CM

## 2024-06-27 PROCEDURE — 99244 OFF/OP CNSLTJ NEW/EST MOD 40: CPT

## 2024-06-30 NOTE — ASSESSMENT
[FreeTextEntry1] :     #Bruising/Bleeding takes prednisone 5mg daily has multiple bruises on exam -patient has extensive bruising, denies bleeding from procedures -smoker -has low ISTH bleeding score of 2 -has autoimmune disorder on prednisone, may have worsening bruising from medications -may need platelet aggregation testing in [place] if hemostatic workup is negative  #Results call either way with lab results

## 2024-06-30 NOTE — REASON FOR VISIT
[Initial Consultation] : an initial consultation for [FreeTextEntry2] : 58-year-old female presenting with increased bruising and bleeding. Although she states it is largely on her extremities she believes this is because she tends to bump those more. If she does bump some on her trunk she is does get a bruise. This has been going on for a long while but is now starting to concern her as her skin is also tearing easily

## 2024-06-30 NOTE — HISTORY OF PRESENT ILLNESS
[de-identified] : 59 year F  presents with bleeding/bruising   Patient  denies epistaxis. Lasts more/less than 10 minutes. Patient denies packing or surgical intervention. Denies transfusion. Patient  has bruises. Has had 5 or more bruises. Has had hematoma with light trauma Patient denies bleeding from minor wounds. Denies/has more than 5 per year. Denies intervention. Patient denies oral cavity bleeding with brushing/flossing/dental procedure. Denies intervention or packing or medication. Patient  denies GI bleeding. Denies  ulcer, portal hypertension, hemorrhoids, angiodysplasia. Has celiac disease.  Patient  denies Hematuria. Has delayed bladder emptying saw urogynecologist. Dark urine.  Patient  denies bleeding from Tooth extraction Patient   denies bleeding from Surgery Has had torn rotator cuff s/p surgery in left shoulder x2, spinal fusion surgery, ankle surgery. Had spinal cystectomy denies bleeding. Denies colonscopy.  Patient  denies bleeding from Menorrhagia Patient  denies Post-partum hemorrhage Patient  denies Muscle hematomas Patient  denies Hemarthrosis Patient  denies CNS bleeding   Denies NSAID use, turmeric use, fish oil, escitalopram or SSRIs, or blood thinners. rarely takes ibuprofen(aleve) Has rare ketorolac  injections, last one March 2024

## 2024-07-01 ENCOUNTER — APPOINTMENT (OUTPATIENT)
Dept: BARIATRICS/WEIGHT MGMT | Facility: CLINIC | Age: 59
End: 2024-07-01
Payer: COMMERCIAL

## 2024-07-01 ENCOUNTER — NON-APPOINTMENT (OUTPATIENT)
Age: 59
End: 2024-07-01

## 2024-07-01 VITALS — HEIGHT: 63 IN | BODY MASS INDEX: 26.58 KG/M2 | WEIGHT: 150 LBS

## 2024-07-01 DIAGNOSIS — Z86.39 PERSONAL HISTORY OF OTHER ENDOCRINE, NUTRITIONAL AND METABOLIC DISEASE: ICD-10-CM

## 2024-07-01 DIAGNOSIS — E03.9 HYPOTHYROIDISM, UNSPECIFIED: ICD-10-CM

## 2024-07-01 DIAGNOSIS — E66.3 OVERWEIGHT: ICD-10-CM

## 2024-07-01 DIAGNOSIS — E66.9 OBESITY, UNSPECIFIED: ICD-10-CM

## 2024-07-01 PROCEDURE — G2211 COMPLEX E/M VISIT ADD ON: CPT

## 2024-07-01 PROCEDURE — 99213 OFFICE O/P EST LOW 20 MIN: CPT

## 2024-07-23 ENCOUNTER — OFFICE (OUTPATIENT)
Dept: URBAN - METROPOLITAN AREA CLINIC 29 | Facility: CLINIC | Age: 59
Setting detail: OPHTHALMOLOGY
End: 2024-07-23
Payer: COMMERCIAL

## 2024-07-23 DIAGNOSIS — H02.403: ICD-10-CM

## 2024-07-23 PROBLEM — H04.321 DACRYOCYSTITIS, ACUTE; RIGHT EYE: Status: ACTIVE | Noted: 2024-07-23

## 2024-07-23 PROCEDURE — 99203 OFFICE O/P NEW LOW 30 MIN: CPT | Performed by: OPHTHALMOLOGY

## 2024-07-23 ASSESSMENT — CONFRONTATIONAL VISUAL FIELD TEST (CVF)
OD_FINDINGS: FULL
OS_FINDINGS: FULL

## 2024-07-23 ASSESSMENT — LID EXAM ASSESSMENTS: OD_EDEMA: RUL 1+

## 2024-07-23 ASSESSMENT — LID POSITION - PTOSIS
OS_PTOSIS: LUL 1+
OD_PTOSIS: RUL 2+

## 2024-09-04 ENCOUNTER — APPOINTMENT (OUTPATIENT)
Dept: FAMILY MEDICINE | Facility: CLINIC | Age: 59
End: 2024-09-04
Payer: COMMERCIAL

## 2024-09-04 VITALS
HEIGHT: 63 IN | DIASTOLIC BLOOD PRESSURE: 79 MMHG | SYSTOLIC BLOOD PRESSURE: 116 MMHG | BODY MASS INDEX: 25.69 KG/M2 | WEIGHT: 145 LBS | OXYGEN SATURATION: 97 % | RESPIRATION RATE: 16 BRPM | HEART RATE: 113 BPM

## 2024-09-04 DIAGNOSIS — N30.00 ACUTE CYSTITIS W/OUT HEMATURIA: ICD-10-CM

## 2024-09-04 DIAGNOSIS — N39.0 URINARY TRACT INFECTION, SITE NOT SPECIFIED: ICD-10-CM

## 2024-09-04 LAB
BILIRUB UR QL STRIP: NORMAL
CLARITY UR: NORMAL
COLLECTION METHOD: NORMAL
GLUCOSE UR-MCNC: NEGATIVE
HCG UR QL: 1 EU/DL
HGB UR QL STRIP.AUTO: NORMAL
KETONES UR-MCNC: NORMAL
LEUKOCYTE ESTERASE UR QL STRIP: NORMAL
NITRITE UR QL STRIP: NEGATIVE
PH UR STRIP: 6
PROT UR STRIP-MCNC: NORMAL
SP GR UR STRIP: 1.03

## 2024-09-04 PROCEDURE — 81003 URINALYSIS AUTO W/O SCOPE: CPT | Mod: QW

## 2024-09-04 PROCEDURE — 99214 OFFICE O/P EST MOD 30 MIN: CPT

## 2024-09-04 RX ORDER — NITROFURANTOIN (MONOHYDRATE/MACROCRYSTALS) 25; 75 MG/1; MG/1
100 CAPSULE ORAL TWICE DAILY
Qty: 10 | Refills: 0 | Status: ACTIVE | COMMUNITY
Start: 2024-09-04 | End: 1900-01-01

## 2024-09-04 NOTE — HISTORY OF PRESENT ILLNESS
[FreeTextEntry8] : 58 yo F presenting with 1 day history of dysuria and foul smelling urine. Has history of urinary retention for which she has seen urogyn. Denies fever, flank pain, n/v.

## 2024-09-10 ENCOUNTER — APPOINTMENT (OUTPATIENT)
Dept: BARIATRICS/WEIGHT MGMT | Facility: CLINIC | Age: 59
End: 2024-09-10
Payer: COMMERCIAL

## 2024-09-10 VITALS
HEIGHT: 63 IN | OXYGEN SATURATION: 98 % | SYSTOLIC BLOOD PRESSURE: 120 MMHG | WEIGHT: 140 LBS | DIASTOLIC BLOOD PRESSURE: 75 MMHG | BODY MASS INDEX: 24.8 KG/M2 | RESPIRATION RATE: 16 BRPM | HEART RATE: 104 BPM

## 2024-09-10 DIAGNOSIS — E66.9 OBESITY, UNSPECIFIED: ICD-10-CM

## 2024-09-10 DIAGNOSIS — E03.9 HYPOTHYROIDISM, UNSPECIFIED: ICD-10-CM

## 2024-09-10 DIAGNOSIS — Z86.39 PERSONAL HISTORY OF OTHER ENDOCRINE, NUTRITIONAL AND METABOLIC DISEASE: ICD-10-CM

## 2024-09-10 PROCEDURE — 99213 OFFICE O/P EST LOW 20 MIN: CPT

## 2024-09-10 PROCEDURE — G2211 COMPLEX E/M VISIT ADD ON: CPT

## 2024-09-10 NOTE — ASSESSMENT
[FreeTextEntry1] : Continue Zepbound, can increase to 15 mg  Decrease dose of phentermine over time Rec getting protein- add protein shake  RTO In 2-3 months

## 2024-09-10 NOTE — HISTORY OF PRESENT ILLNESS
[FreeTextEntry1] : 57 y/o female here for medical weight loss consultation Concerned that she has gained 50 lbs since having back issues and during this COVID 19 pandemic. Has tried dietary modification, but is struggling with staying motivated and frustrated that she cannot make the right choices with food or exercise at this time.  C/o low energy Has tried phentermine in the past with good success, lowest adult weight 120 lbs, current weight approximately 190 lbs. Denies trying any additional medication for weight loss.  Medical Hx: fibromyalgia, GERD, Celiac, hypothyroidism, colon polyp, + post menopausal, lumbar radiculopathy Surgical Hx: Lumbar lami with fusion 3 months ago- clear to do light exercise at this time (walking, stationary bike), rotator cuff repair, ankle surgery  Food Recall: B- caramel M&Ms, L-tator tots, cheese, onions, tomatoes, ranch dressing, Snacks- cookies, potato chips. 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 Taking 0.25 mg SQ weekly, denies side effects. Has been meal prepping for work- brings greek yogurt, hummus with celery, hard boiled eggs. Snacks on popcorn or strawberries, avoiding sweets. Water intake- improved, able to get in 64 ounces some days. Dancing at home for exercise, plans to start walking outside once the weather improves. Labs done on 1/20/22- HgBA1c 6.0%, Cholesterol, LDL & Triglycerides elevated.  3/22/22: Lost 7 lbs. Taking Wegovy 0.5 mg SQ weekly, + fatigue, +nausea, + emesis 3 times per week after laying down. Continues to eat small portions (strawberries, hummus with rice crackers, greek yogurt, salads), but feels full quickly. Took last dose 2 days ago. Water intake- inadequate. Unable to exercise due to fatigue. Has taken phentermine in the past with good success.   4/21/22: Lost 3 lbs, down 23 lbs total. Discontinued Wegovy and has been taking Phentermine 15-30 mg daily, tolerating well. Denies fatigue, but able to fall asleep at night. Reports increased cravings and increased episodes of emotional eating since discontinuing Wegovy. Walking outdoors 2 times per week, plans on getting a treadmill and has a goal to walk daily for 30 mins. Would like to re-try Wegovy or GLP1 to help with cravings.    5/24/22: Lost 3 lb, down 26 lb total. Tolerating phentermine 15 mg daily & saxenda 2.4 mg SQ daily. Notices some fatigue but tolerating better than Wegovy and feels that the phentermine helps with this. Water intake- sufficient, continues to work on. Walking for exercise 1-3 miles 2 x per week, doesn't like the treadmill. Reports good appetite suppression and portion control.   7/7/22: Lost 5 lbs, down 31 lbs total. Tolerating phentermine 15 mg & saxenda daily. Noticing some fatigue and increased cravings in the afternoon. Walks for exercise when she has energy, but often feels tired in the afternoon. Reviewed recent lab results.   8/11/22: Lost 1 lb, 32 lbs total. Tolerating phentermine 30 mg & saxenda 3 mg Daily. Reports increased cravings and hunger while at work. Walks for exercise- would like to join [employer] if one opens locally. Interested in trying Wegovy instead of saxenda to see if it helps with hunger. Tries to focus on eating healthy food throughout the day, but struggles at work.   9/19/22: Lost 2 lbs, 35 lbs total. Tolerating phentermine 30 mg & saxenda 3 mg daily. Walking for exercise. Eating small portions and healthier meals.Cut back on smoking, reports that smoking is more social.   2/9/23: Gained 37 lbs since we last saw each other, discontinued all medical weight management medications and has been struggling with cravings and hunger throughout the day. Physical activity limited due to recent Left arm rotator cuff surgery. Upset and frustrated with herself plus struggling with fatigue at work. WOuld like to restart medication treatment at this time  3/2/23: Feeling better, lost 10 lbs. Tolerating medication, denies side effects but continues to feel tired throughout the day. Eating healthier dietary options and has been working on exercises post shoulder surgery   3/28/23: Lost 3 lbs, down 13 lbs total. Tolerating wegovy .5 mg weekly, has some constipation. Continues to struggle with cravings/ hunger so taking phentermine as well to help with hunger and fatigue. Continues to do PT for shoulder. Removed all unhealthy snacks from house and eating healthier meals throughout the day.  4/25/23: Down 8 lbs, lost 21 lbs total since starting back on treatment plan. Continues to walk 15-20 mins most days and does PT 2 x per week. Tolerating wegovy 1 mg weekly & phentermine 23 mg daily. Feels nauseous if she overeats but otherwise denies side effects. Eating healthy diet for the most part. Had Christy V-series abdominal treatment done, which she felt was helpful. Working on decreasing smoking- down to 3 cigarrettes per day. Continues to struggle with high stress level related to work.   5/25/23: Down 26 lbs total. Sleeping better, feels well overall. good energy. Eating 2 meals per day with protein and vegetables. Walking for exercise.   6/21/23: Down 32 lbs total. Continues to walk for exercise, sleeping well. Eating better overall. Gets tired, but feels that phentermine helps with fatigue. Checked BP and pulse at the office earlier today.  9/26/23: Down 34 lbs total. Eating 2 meals with protein, drinking more water. Feels well overall, just struggling with back pain and increased stress.  1/25/24: Maintained weight. Continues to focus on eating healthier options, but enjoys coffee sweet and has been emotionally eating lately due to stress- best friend has been sick. Continues to struggle with back pain after surgical repair, seeing ortho next. In addition struggles with pain from RA.  4/3/24: Gained 5-6 lbs over the last few months. Feels that the wegovy doesn't help as well anymore, noticing more cravings for sweets/ candy. Exercise limited by fibromyalgia and recently tore hamstring. Drinking 40 ounces a day. Has been receiving treatment for bladder issues and doing PT for this. Continues to eat healthy diet but snacking on candy more lately.   7/1/24: Feeling well. Started taking the Zepbound 7.5 mg weekly. Eating protein, fruit and vegetables. Denies side effects. Making more meals throughout the summer. Getting in more activity. Going for PT for a torn hamstring. Geting more water intake.   9/10/24: Continues to take Zepbound and phentermine. Continues to struggle with feeling tired. Eats 1 solid meal per day and snacks- fruit, nuts. Struggling with urinary retention and recent UTI. Going to have physical this month.

## 2024-09-12 LAB — BACTERIA UR CULT: ABNORMAL

## 2024-09-30 ENCOUNTER — LABORATORY RESULT (OUTPATIENT)
Age: 59
End: 2024-09-30

## 2024-09-30 ENCOUNTER — APPOINTMENT (OUTPATIENT)
Dept: FAMILY MEDICINE | Facility: CLINIC | Age: 59
End: 2024-09-30
Payer: COMMERCIAL

## 2024-09-30 VITALS
RESPIRATION RATE: 16 BRPM | SYSTOLIC BLOOD PRESSURE: 103 MMHG | WEIGHT: 135 LBS | HEART RATE: 81 BPM | BODY MASS INDEX: 23.92 KG/M2 | HEIGHT: 63 IN | OXYGEN SATURATION: 99 % | DIASTOLIC BLOOD PRESSURE: 69 MMHG

## 2024-09-30 DIAGNOSIS — K90.0 CELIAC DISEASE: ICD-10-CM

## 2024-09-30 DIAGNOSIS — E03.9 HYPOTHYROIDISM, UNSPECIFIED: ICD-10-CM

## 2024-09-30 DIAGNOSIS — M35.3 POLYMYALGIA RHEUMATICA: ICD-10-CM

## 2024-09-30 DIAGNOSIS — F17.200 NICOTINE DEPENDENCE, UNSPECIFIED, UNCOMPLICATED: ICD-10-CM

## 2024-09-30 DIAGNOSIS — Z12.31 ENCOUNTER FOR SCREENING MAMMOGRAM FOR MALIGNANT NEOPLASM OF BREAST: ICD-10-CM

## 2024-09-30 DIAGNOSIS — M79.7 FIBROMYALGIA: ICD-10-CM

## 2024-09-30 DIAGNOSIS — R92.1 MAMMOGRAPHIC CALCIFICATION FOUND ON DIAGNOSTIC IMAGING OF BREAST: ICD-10-CM

## 2024-09-30 DIAGNOSIS — Z00.00 ENCOUNTER FOR GENERAL ADULT MEDICAL EXAMINATION W/OUT ABNORMAL FINDINGS: ICD-10-CM

## 2024-09-30 LAB
25(OH)D3 SERPL-MCNC: 38.6 NG/ML
ALBUMIN SERPL ELPH-MCNC: 4 G/DL
ALP BLD-CCNC: 175 U/L
ALT SERPL-CCNC: 26 U/L
ANION GAP SERPL CALC-SCNC: 14 MMOL/L
AST SERPL-CCNC: 22 U/L
BILIRUB SERPL-MCNC: <0.2 MG/DL
BUN SERPL-MCNC: 13 MG/DL
CALCIUM SERPL-MCNC: 9 MG/DL
CHLORIDE SERPL-SCNC: 102 MMOL/L
CHOLEST SERPL-MCNC: 223 MG/DL
CO2 SERPL-SCNC: 23 MMOL/L
CREAT SERPL-MCNC: 0.85 MG/DL
EGFR: 78 ML/MIN/1.73M2
ESTIMATED AVERAGE GLUCOSE: 103 MG/DL
FOLATE SERPL-MCNC: 3.9 NG/ML
GLUCOSE SERPL-MCNC: 95 MG/DL
HBA1C MFR BLD HPLC: 5.2 %
HCT VFR BLD CALC: 38.9 %
HDLC SERPL-MCNC: 71 MG/DL
HGB BLD-MCNC: 12.3 G/DL
LDLC SERPL CALC-MCNC: 125 MG/DL
MCHC RBC-ENTMCNC: 29.4 PG
MCHC RBC-ENTMCNC: 31.6 GM/DL
MCV RBC AUTO: 93.1 FL
NONHDLC SERPL-MCNC: 152 MG/DL
PLATELET # BLD AUTO: 352 K/UL
POTASSIUM SERPL-SCNC: 4.6 MMOL/L
PROT SERPL-MCNC: 6.3 G/DL
RBC # BLD: 4.18 M/UL
RBC # FLD: 13.3 %
SODIUM SERPL-SCNC: 139 MMOL/L
T3FREE SERPL-MCNC: 3 PG/ML
T4 FREE SERPL-MCNC: 1.8 NG/DL
TRIGL SERPL-MCNC: 161 MG/DL
TSH SERPL-ACNC: 0.06 UIU/ML
VIT B12 SERPL-MCNC: 878 PG/ML
WBC # FLD AUTO: 8.59 K/UL

## 2024-09-30 PROCEDURE — 99396 PREV VISIT EST AGE 40-64: CPT

## 2024-09-30 NOTE — HEALTH RISK ASSESSMENT
[Good] : ~his/her~  mood as  good [Yes] : Yes [Monthly or less (1 pt)] : Monthly or less (1 point) [1 or 2 (0 pts)] : 1 or 2 (0 points) [Never (0 pts)] : Never (0 points) [No falls in past year] : Patient reported no falls in the past year [0] : 2) Feeling down, depressed, or hopeless: Not at all (0) [PHQ-2 Negative - No further assessment needed] : PHQ-2 Negative - No further assessment needed [Current] : Current [Patient declined PAP Smear] : Patient declined PAP Smear [Patient declined bone density test] : Patient declined bone density test [HIV test declined] : HIV test declined [Hepatitis C test declined] : Hepatitis C test declined [None] : None [Employed] : employed [Single] : single [] :  [# Of Children ___] : has [unfilled] children [Fully functional (bathing, dressing, toileting, transferring, walking, feeding)] : Fully functional (bathing, dressing, toileting, transferring, walking, feeding) [Fully functional (using the telephone, shopping, preparing meals, housekeeping, doing laundry, using] : Fully functional and needs no help or supervision to perform IADLs (using the telephone, shopping, preparing meals, housekeeping, doing laundry, using transportation, managing medications and managing finances) [Smoke Detector] : smoke detector [Carbon Monoxide Detector] : carbon monoxide detector [Audit-CScore] : 1 [[id number]] : 0 [Change in mental status noted] : No change in mental status noted [Reports changes in hearing] : Reports no changes in hearing [Reports changes in vision] : Reports no changes in vision [Reports changes in dental health] : Reports no changes in dental health [MammogramDate] : 10/23/2023 [ColonoscopyDate] : 2021

## 2024-09-30 NOTE — HEALTH RISK ASSESSMENT
[Good] : ~his/her~  mood as  good [Yes] : Yes [Monthly or less (1 pt)] : Monthly or less (1 point) [1 or 2 (0 pts)] : 1 or 2 (0 points) [Never (0 pts)] : Never (0 points) [No falls in past year] : Patient reported no falls in the past year [0] : 2) Feeling down, depressed, or hopeless: Not at all (0) [PHQ-2 Negative - No further assessment needed] : PHQ-2 Negative - No further assessment needed [Current] : Current [Patient declined PAP Smear] : Patient declined PAP Smear [Patient declined bone density test] : Patient declined bone density test [HIV test declined] : HIV test declined [Hepatitis C test declined] : Hepatitis C test declined [None] : None [Employed] : employed [Single] : single [] :  [# Of Children ___] : has [unfilled] children [Fully functional (bathing, dressing, toileting, transferring, walking, feeding)] : Fully functional (bathing, dressing, toileting, transferring, walking, feeding) [Fully functional (using the telephone, shopping, preparing meals, housekeeping, doing laundry, using] : Fully functional and needs no help or supervision to perform IADLs (using the telephone, shopping, preparing meals, housekeeping, doing laundry, using transportation, managing medications and managing finances) [Smoke Detector] : smoke detector [Carbon Monoxide Detector] : carbon monoxide detector [Audit-CScore] : 1 [[id number]] : 0 [Change in mental status noted] : No change in mental status noted [Reports changes in hearing] : Reports no changes in hearing [Reports changes in vision] : Reports no changes in vision [Reports changes in dental health] : Reports no changes in dental health [MammogramDate] : 10/23/2023 [ColonoscopyDate] : 2021

## 2024-09-30 NOTE — HISTORY OF PRESENT ILLNESS
[FreeTextEntry1] : Annual wellness visit [de-identified] : 59-year-old female presenting for annual wellness visit She is feeling at baseline, still has aches and pains She recently went through a lot of trauma with urinary retention and is following with urology She states that at home on a urine dip she has been seeing positive hematuria and bacteria which concerns her sister managing her antibiotics soon Patient otherwise reports she is stable, up-to-date on screenings and vaccines with the exception of low-dose CT.  Still continues to smoke but down to 3 cigarettes a day which is a drastic decrease

## 2024-09-30 NOTE — HISTORY OF PRESENT ILLNESS
[FreeTextEntry1] : Annual wellness visit [de-identified] : 59-year-old female presenting for annual wellness visit She is feeling at baseline, still has aches and pains She recently went through a lot of trauma with urinary retention and is following with urology She states that at home on a urine dip she has been seeing positive hematuria and bacteria which concerns her sister managing her antibiotics soon Patient otherwise reports she is stable, up-to-date on screenings and vaccines with the exception of low-dose CT.  Still continues to smoke but down to 3 cigarettes a day which is a drastic decrease

## 2024-10-01 LAB
APPEARANCE: CLEAR
BILIRUBIN URINE: NEGATIVE
BLOOD URINE: ABNORMAL
COLOR: YELLOW
GLUCOSE QUALITATIVE U: NEGATIVE
KETONES URINE: NEGATIVE
LEUKOCYTE ESTERASE URINE: NEGATIVE
NITRITE URINE: NEGATIVE
PH URINE: 6
PROTEIN URINE: ABNORMAL
SPECIFIC GRAVITY URINE: >=1.03
UROBILINOGEN URINE: NORMAL

## 2024-10-08 ENCOUNTER — NON-APPOINTMENT (OUTPATIENT)
Age: 59
End: 2024-10-08

## 2024-10-21 DIAGNOSIS — R74.8 ABNORMAL LEVELS OF OTHER SERUM ENZYMES: ICD-10-CM

## 2024-10-25 ENCOUNTER — RX RENEWAL (OUTPATIENT)
Age: 59
End: 2024-10-25

## 2024-10-25 RX ORDER — LEVOTHYROXINE SODIUM 0.1 MG/1
100 TABLET ORAL
Qty: 90 | Refills: 2 | Status: ACTIVE | COMMUNITY
Start: 2024-10-25 | End: 1900-01-01

## 2024-10-26 ENCOUNTER — TRANSCRIPTION ENCOUNTER (OUTPATIENT)
Age: 59
End: 2024-10-26

## 2024-10-28 ENCOUNTER — RESULT REVIEW (OUTPATIENT)
Age: 59
End: 2024-10-28

## 2024-10-28 RX ORDER — CIPROFLOXACIN HYDROCHLORIDE 500 MG/1
500 TABLET, FILM COATED ORAL
Qty: 6 | Refills: 0 | Status: ACTIVE | COMMUNITY
Start: 2024-10-28

## 2024-10-31 ENCOUNTER — APPOINTMENT (OUTPATIENT)
Dept: SURGERY | Facility: CLINIC | Age: 59
End: 2024-10-31
Payer: COMMERCIAL

## 2024-10-31 VITALS
RESPIRATION RATE: 16 BRPM | DIASTOLIC BLOOD PRESSURE: 80 MMHG | TEMPERATURE: 97.7 F | OXYGEN SATURATION: 98 % | SYSTOLIC BLOOD PRESSURE: 106 MMHG | WEIGHT: 135 LBS | BODY MASS INDEX: 23.92 KG/M2 | HEART RATE: 104 BPM | HEIGHT: 63 IN

## 2024-10-31 DIAGNOSIS — K80.20 CALCULUS OF GALLBLADDER W/OUT CHOLECYSTITIS W/OUT OBSTRUCTION: ICD-10-CM

## 2024-10-31 PROCEDURE — 99203 OFFICE O/P NEW LOW 30 MIN: CPT

## 2024-11-01 PROBLEM — K80.20 GALLSTONES: Status: ACTIVE | Noted: 2024-11-01

## 2024-11-21 ENCOUNTER — RESULT REVIEW (OUTPATIENT)
Age: 59
End: 2024-11-21

## 2024-11-25 ENCOUNTER — NON-APPOINTMENT (OUTPATIENT)
Age: 59
End: 2024-11-25

## 2024-12-02 ENCOUNTER — APPOINTMENT (OUTPATIENT)
Dept: UROLOGY | Facility: HOSPITAL | Age: 59
End: 2024-12-02

## 2024-12-02 ENCOUNTER — TRANSCRIPTION ENCOUNTER (OUTPATIENT)
Age: 59
End: 2024-12-02

## 2024-12-06 ENCOUNTER — APPOINTMENT (OUTPATIENT)
Dept: UROLOGY | Facility: CLINIC | Age: 59
End: 2024-12-06
Payer: COMMERCIAL

## 2024-12-06 VITALS
DIASTOLIC BLOOD PRESSURE: 76 MMHG | OXYGEN SATURATION: 100 % | BODY MASS INDEX: 23.74 KG/M2 | SYSTOLIC BLOOD PRESSURE: 117 MMHG | WEIGHT: 134 LBS | HEIGHT: 63 IN | HEART RATE: 109 BPM

## 2024-12-06 PROCEDURE — 51798 US URINE CAPACITY MEASURE: CPT

## 2024-12-06 PROCEDURE — 99213 OFFICE O/P EST LOW 20 MIN: CPT | Mod: 24

## 2024-12-07 PROBLEM — N31.2 ATONIC NEUROGENIC BLADDER: Status: ACTIVE | Noted: 2024-12-07

## 2024-12-11 ENCOUNTER — APPOINTMENT (OUTPATIENT)
Dept: UROLOGY | Facility: CLINIC | Age: 59
End: 2024-12-11
Payer: COMMERCIAL

## 2024-12-11 VITALS
DIASTOLIC BLOOD PRESSURE: 75 MMHG | SYSTOLIC BLOOD PRESSURE: 104 MMHG | WEIGHT: 134 LBS | OXYGEN SATURATION: 100 % | HEIGHT: 63 IN | BODY MASS INDEX: 23.74 KG/M2

## 2024-12-11 DIAGNOSIS — N31.2 FLACCID NEUROPATHIC BLADDER, NOT ELSEWHERE CLASSIFIED: ICD-10-CM

## 2024-12-11 PROCEDURE — 51798 US URINE CAPACITY MEASURE: CPT

## 2024-12-11 PROCEDURE — 99213 OFFICE O/P EST LOW 20 MIN: CPT

## 2024-12-12 RX ORDER — CEPHALEXIN 250 MG/1
250 CAPSULE ORAL AT BEDTIME
Qty: 90 | Refills: 0 | Status: ACTIVE | COMMUNITY
Start: 2024-12-12 | End: 1900-01-01

## 2024-12-27 ENCOUNTER — TRANSCRIPTION ENCOUNTER (OUTPATIENT)
Age: 59
End: 2024-12-27

## 2024-12-30 ENCOUNTER — TRANSCRIPTION ENCOUNTER (OUTPATIENT)
Age: 59
End: 2024-12-30

## 2024-12-31 ENCOUNTER — TRANSCRIPTION ENCOUNTER (OUTPATIENT)
Age: 59
End: 2024-12-31

## 2025-01-06 ENCOUNTER — TRANSCRIPTION ENCOUNTER (OUTPATIENT)
Age: 60
End: 2025-01-06

## 2025-01-06 ENCOUNTER — APPOINTMENT (OUTPATIENT)
Dept: UROLOGY | Facility: HOSPITAL | Age: 60
End: 2025-01-06

## 2025-01-07 RX ORDER — CEPHALEXIN 500 MG/1
500 CAPSULE ORAL
Qty: 10 | Refills: 0 | Status: ACTIVE | COMMUNITY
Start: 2025-01-07 | End: 1900-01-01

## 2025-01-08 ENCOUNTER — TRANSCRIPTION ENCOUNTER (OUTPATIENT)
Age: 60
End: 2025-01-08

## 2025-01-09 ENCOUNTER — APPOINTMENT (OUTPATIENT)
Dept: UROLOGY | Facility: CLINIC | Age: 60
End: 2025-01-09
Payer: COMMERCIAL

## 2025-01-09 DIAGNOSIS — N31.2 FLACCID NEUROPATHIC BLADDER, NOT ELSEWHERE CLASSIFIED: ICD-10-CM

## 2025-01-09 PROCEDURE — 51798 US URINE CAPACITY MEASURE: CPT

## 2025-01-13 ENCOUNTER — RX RENEWAL (OUTPATIENT)
Age: 60
End: 2025-01-13

## 2025-01-14 ENCOUNTER — APPOINTMENT (OUTPATIENT)
Dept: UROLOGY | Facility: CLINIC | Age: 60
End: 2025-01-14

## 2025-01-17 ENCOUNTER — APPOINTMENT (OUTPATIENT)
Dept: UROLOGY | Facility: CLINIC | Age: 60
End: 2025-01-17
Payer: COMMERCIAL

## 2025-01-17 DIAGNOSIS — N31.9 NEUROMUSCULAR DYSFUNCTION OF BLADDER, UNSPECIFIED: ICD-10-CM

## 2025-01-17 PROCEDURE — 99213 OFFICE O/P EST LOW 20 MIN: CPT | Mod: 24

## 2025-01-17 PROCEDURE — 51798 US URINE CAPACITY MEASURE: CPT

## 2025-01-21 ENCOUNTER — TRANSCRIPTION ENCOUNTER (OUTPATIENT)
Age: 60
End: 2025-01-21

## 2025-01-21 ENCOUNTER — APPOINTMENT (OUTPATIENT)
Dept: UROLOGY | Facility: HOSPITAL | Age: 60
End: 2025-01-21

## 2025-01-24 NOTE — PHYSICAL EXAM
PA for Zepbound 2.5 mg  EXCLUDED from plan       Reason:(Screenshot if applicable)        Message sent to office clinical pool Yes    Appeal is not warranted unless the following is met:    DX of DOMENICA and the below cardiac events:   You have established cardiovascular disease as evidenced by one of the following:    (1) Prior myocardial infarction    (2) Prior ischemic or hemorrhagic stroke    (3) Symptomatic peripheral arterial disease evidenced by one of the following:     (A) Intermittent claudication with ankle-brachial index less than 0.85 (at rest)     (B) Peripheral arterial revascularization procedure     (C) Amputation due to atherosclerotic disease     [Normocephalic] : normocephalic [Atraumatic] : atraumatic [EOMI] : extra ocular movement intact [Supple] : supple [No Supraclavicular Adenopathy] : no supraclavicular adenopathy [No Cervical Adenopathy] : no cervical adenopathy [Examined in the supine and seated position] : examined in the supine and seated position [No dominant masses] : no dominant masses in right breast  [No dominant masses] : no dominant masses left breast [No Nipple Retraction] : no left nipple retraction [No Nipple Discharge] : no left nipple discharge [Breast Mass Right Breast ___cm] : no masses [Breast Mass Left Breast ___cm] : no masses [No Axillary Lymphadenopathy] : no left axillary lymphadenopathy [No Edema] : no edema [No Rashes] : no rashes [No Ulceration] : no ulceration [Breast Nipple Inversion] : nipples not inverted [Breast Nipple Retraction] : nipples not retracted [Breast Nipple Flattening] : nipples not flattened [Breast Nipple Fissures] : nipples not fissured [Breast Abnormal Lactation (Galactorrhea)] : no galactorrhea [Breast Abnormal Secretion Bloody Fluid] : no bloody discharge [Breast Abnormal Secretion Serous Fluid] : no serous discharge [Breast Abnormal Secretion Opalescent Fluid] : no milky discharge [de-identified] : On exam, the patient has ptotic full C-cup breasts.  On palpation, I cannot feel any suspicious densities in either breast.  She has no axillary, supraclavicular, or cervical adenopathy.

## 2025-01-31 ENCOUNTER — NON-APPOINTMENT (OUTPATIENT)
Age: 60
End: 2025-01-31

## 2025-02-01 ENCOUNTER — TRANSCRIPTION ENCOUNTER (OUTPATIENT)
Age: 60
End: 2025-02-01

## 2025-02-04 ENCOUNTER — NON-APPOINTMENT (OUTPATIENT)
Age: 60
End: 2025-02-04

## 2025-02-07 ENCOUNTER — APPOINTMENT (OUTPATIENT)
Dept: UROLOGY | Facility: CLINIC | Age: 60
End: 2025-02-07
Payer: COMMERCIAL

## 2025-02-07 VITALS
HEART RATE: 108 BPM | DIASTOLIC BLOOD PRESSURE: 68 MMHG | SYSTOLIC BLOOD PRESSURE: 102 MMHG | OXYGEN SATURATION: 95 % | RESPIRATION RATE: 16 BRPM

## 2025-02-07 VITALS
WEIGHT: 131 LBS | DIASTOLIC BLOOD PRESSURE: 73 MMHG | BODY MASS INDEX: 23.21 KG/M2 | HEART RATE: 105 BPM | SYSTOLIC BLOOD PRESSURE: 105 MMHG | HEIGHT: 63 IN

## 2025-02-07 DIAGNOSIS — N39.0 URINARY TRACT INFECTION, SITE NOT SPECIFIED: ICD-10-CM

## 2025-02-07 DIAGNOSIS — N31.9 NEUROMUSCULAR DYSFUNCTION OF BLADDER, UNSPECIFIED: ICD-10-CM

## 2025-02-07 DIAGNOSIS — N20.0 CALCULUS OF KIDNEY: ICD-10-CM

## 2025-02-07 PROCEDURE — 99214 OFFICE O/P EST MOD 30 MIN: CPT | Mod: 24

## 2025-02-07 PROCEDURE — G2211 COMPLEX E/M VISIT ADD ON: CPT

## 2025-02-07 PROCEDURE — 99204 OFFICE O/P NEW MOD 45 MIN: CPT

## 2025-02-07 RX ORDER — ESTRADIOL 0.1 MG/G
0.1 CREAM VAGINAL
Qty: 1 | Refills: 3 | Status: ACTIVE | COMMUNITY
Start: 2025-02-07 | End: 1900-01-01

## 2025-02-09 PROBLEM — N39.0 RECURRENT UTI: Status: ACTIVE | Noted: 2025-02-07

## 2025-02-09 PROBLEM — N20.0 KIDNEY STONE ON LEFT SIDE: Status: ACTIVE | Noted: 2025-02-09

## 2025-02-10 ENCOUNTER — RESULT REVIEW (OUTPATIENT)
Age: 60
End: 2025-02-10

## 2025-02-10 ENCOUNTER — TRANSCRIPTION ENCOUNTER (OUTPATIENT)
Age: 60
End: 2025-02-10

## 2025-02-10 ENCOUNTER — APPOINTMENT (OUTPATIENT)
Dept: UROLOGY | Facility: HOSPITAL | Age: 60
End: 2025-02-10

## 2025-02-10 RX ORDER — IBUPROFEN 600 MG/1
600 TABLET, FILM COATED ORAL
Qty: 30 | Refills: 0 | Status: ACTIVE | COMMUNITY
Start: 2025-02-10 | End: 1900-01-01

## 2025-02-13 RX ORDER — PHENTERMINE HYDROCHLORIDE 15 MG/1
15 CAPSULE ORAL
Qty: 30 | Refills: 1 | Status: ACTIVE | COMMUNITY
Start: 2025-02-13 | End: 1900-01-01

## 2025-03-13 ENCOUNTER — APPOINTMENT (OUTPATIENT)
Dept: BARIATRICS/WEIGHT MGMT | Facility: CLINIC | Age: 60
End: 2025-03-13
Payer: COMMERCIAL

## 2025-03-13 ENCOUNTER — APPOINTMENT (OUTPATIENT)
Dept: UROLOGY | Facility: CLINIC | Age: 60
End: 2025-03-13
Payer: COMMERCIAL

## 2025-03-13 VITALS
WEIGHT: 134 LBS | BODY MASS INDEX: 23.74 KG/M2 | HEIGHT: 63 IN | SYSTOLIC BLOOD PRESSURE: 99 MMHG | OXYGEN SATURATION: 100 % | DIASTOLIC BLOOD PRESSURE: 58 MMHG | HEART RATE: 105 BPM | RESPIRATION RATE: 16 BRPM

## 2025-03-13 VITALS
WEIGHT: 134 LBS | DIASTOLIC BLOOD PRESSURE: 74 MMHG | BODY MASS INDEX: 23.74 KG/M2 | HEART RATE: 98 BPM | HEIGHT: 63 IN | SYSTOLIC BLOOD PRESSURE: 112 MMHG

## 2025-03-13 DIAGNOSIS — E66.811 OBESITY, CLASS 1: ICD-10-CM

## 2025-03-13 DIAGNOSIS — N39.0 URINARY TRACT INFECTION, SITE NOT SPECIFIED: ICD-10-CM

## 2025-03-13 DIAGNOSIS — E78.00 PURE HYPERCHOLESTEROLEMIA, UNSPECIFIED: ICD-10-CM

## 2025-03-13 DIAGNOSIS — R73.09 OTHER ABNORMAL GLUCOSE: ICD-10-CM

## 2025-03-13 DIAGNOSIS — N31.2 FLACCID NEUROPATHIC BLADDER, NOT ELSEWHERE CLASSIFIED: ICD-10-CM

## 2025-03-13 DIAGNOSIS — N20.0 CALCULUS OF KIDNEY: ICD-10-CM

## 2025-03-13 PROCEDURE — 99214 OFFICE O/P EST MOD 30 MIN: CPT | Mod: 24

## 2025-03-13 PROCEDURE — 99214 OFFICE O/P EST MOD 30 MIN: CPT

## 2025-03-13 RX ORDER — CEPHALEXIN 500 MG/1
500 CAPSULE ORAL
Qty: 10 | Refills: 0 | Status: ACTIVE | COMMUNITY
Start: 2025-03-13 | End: 1900-01-01

## 2025-03-16 ENCOUNTER — NON-APPOINTMENT (OUTPATIENT)
Age: 60
End: 2025-03-16

## 2025-03-17 LAB — BACTERIA UR CULT: ABNORMAL

## 2025-03-17 RX ORDER — AMOXICILLIN AND CLAVULANATE POTASSIUM 500; 125 MG/1; MG/1
500-125 TABLET, FILM COATED ORAL
Qty: 14 | Refills: 0 | Status: DISCONTINUED | COMMUNITY
Start: 2025-03-17 | End: 2025-03-17

## 2025-03-24 ENCOUNTER — TRANSCRIPTION ENCOUNTER (OUTPATIENT)
Age: 60
End: 2025-03-24

## 2025-03-28 ENCOUNTER — RESULT REVIEW (OUTPATIENT)
Age: 60
End: 2025-03-28

## 2025-04-01 ENCOUNTER — TRANSCRIPTION ENCOUNTER (OUTPATIENT)
Age: 60
End: 2025-04-01

## 2025-04-02 ENCOUNTER — TRANSCRIPTION ENCOUNTER (OUTPATIENT)
Age: 60
End: 2025-04-02

## 2025-04-29 ENCOUNTER — APPOINTMENT (OUTPATIENT)
Dept: ORTHOPEDIC SURGERY | Facility: CLINIC | Age: 60
End: 2025-04-29
Payer: COMMERCIAL

## 2025-04-29 DIAGNOSIS — M54.50 LOW BACK PAIN, UNSPECIFIED: ICD-10-CM

## 2025-04-29 PROCEDURE — 72110 X-RAY EXAM L-2 SPINE 4/>VWS: CPT

## 2025-04-29 PROCEDURE — 99214 OFFICE O/P EST MOD 30 MIN: CPT

## 2025-05-01 ENCOUNTER — APPOINTMENT (OUTPATIENT)
Dept: PAIN MANAGEMENT | Facility: CLINIC | Age: 60
End: 2025-05-01
Payer: COMMERCIAL

## 2025-05-01 ENCOUNTER — APPOINTMENT (OUTPATIENT)
Dept: FAMILY MEDICINE | Facility: CLINIC | Age: 60
End: 2025-05-01
Payer: COMMERCIAL

## 2025-05-01 ENCOUNTER — LABORATORY RESULT (OUTPATIENT)
Age: 60
End: 2025-05-01

## 2025-05-01 DIAGNOSIS — M96.1 POSTLAMINECTOMY SYNDROME, NOT ELSEWHERE CLASSIFIED: ICD-10-CM

## 2025-05-01 DIAGNOSIS — M53.3 SACROCOCCYGEAL DISORDERS, NOT ELSEWHERE CLASSIFIED: ICD-10-CM

## 2025-05-01 DIAGNOSIS — M47.817 SPONDYLOSIS W/OUT MYELOPATHY OR RADICULOPATHY, LUMBOSACRAL REGION: ICD-10-CM

## 2025-05-01 DIAGNOSIS — G89.4 CHRONIC PAIN SYNDROME: ICD-10-CM

## 2025-05-01 DIAGNOSIS — M54.16 RADICULOPATHY, LUMBAR REGION: ICD-10-CM

## 2025-05-01 PROCEDURE — G2211 COMPLEX E/M VISIT ADD ON: CPT | Mod: 2W

## 2025-05-01 PROCEDURE — ZZZZZ: CPT

## 2025-05-01 PROCEDURE — 99214 OFFICE O/P EST MOD 30 MIN: CPT | Mod: 2W

## 2025-05-02 LAB
APPEARANCE: ABNORMAL
BILIRUBIN URINE: NEGATIVE
BLOOD URINE: NEGATIVE
COLOR: YELLOW
GLUCOSE QUALITATIVE U: NEGATIVE MG/DL
KETONES URINE: ABNORMAL MG/DL
LEUKOCYTE ESTERASE URINE: ABNORMAL
NITRITE URINE: POSITIVE
PH URINE: 6
PROTEIN URINE: NORMAL MG/DL
SPECIFIC GRAVITY URINE: 1.02
UROBILINOGEN URINE: 0.2 MG/DL

## 2025-05-02 RX ORDER — SYRINGE, DISPOSABLE, 1 ML
50 SYRINGE, EMPTY DISPOSABLE MISCELLANEOUS
Qty: 10 | Refills: 2 | Status: ACTIVE | COMMUNITY
Start: 2025-05-02 | End: 1900-01-01

## 2025-05-07 LAB — BACTERIA UR CULT: ABNORMAL

## 2025-05-14 ENCOUNTER — APPOINTMENT (OUTPATIENT)
Dept: PAIN MANAGEMENT | Facility: CLINIC | Age: 60
End: 2025-05-14
Payer: COMMERCIAL

## 2025-05-14 VITALS
OXYGEN SATURATION: 97 % | SYSTOLIC BLOOD PRESSURE: 122 MMHG | HEART RATE: 125 BPM | DIASTOLIC BLOOD PRESSURE: 79 MMHG | RESPIRATION RATE: 16 BRPM

## 2025-05-14 DIAGNOSIS — M53.3 SACROCOCCYGEAL DISORDERS, NOT ELSEWHERE CLASSIFIED: ICD-10-CM

## 2025-05-14 DIAGNOSIS — M96.1 POSTLAMINECTOMY SYNDROME, NOT ELSEWHERE CLASSIFIED: ICD-10-CM

## 2025-05-14 PROCEDURE — 27096 INJECT SACROILIAC JOINT: CPT | Mod: LT

## 2025-05-14 RX ADMIN — BUPIVACAINE HYDROCHLORIDE 0 %: 2.5 INJECTION, SOLUTION EPIDURAL; INFILTRATION; INTRACAUDAL at 00:00

## 2025-05-14 RX ADMIN — Medication %: at 00:00

## 2025-05-14 RX ADMIN — TRIAMCINOLONE ACETONIDE 0 MG/ML: 10 INJECTION, SUSPENSION INTRA-ARTICULAR; INTRALESIONAL at 00:00

## 2025-05-15 ENCOUNTER — NON-APPOINTMENT (OUTPATIENT)
Age: 60
End: 2025-05-15

## 2025-05-29 ENCOUNTER — APPOINTMENT (OUTPATIENT)
Dept: PAIN MANAGEMENT | Facility: CLINIC | Age: 60
End: 2025-05-29
Payer: COMMERCIAL

## 2025-05-29 DIAGNOSIS — M54.16 RADICULOPATHY, LUMBAR REGION: ICD-10-CM

## 2025-05-29 DIAGNOSIS — G89.4 CHRONIC PAIN SYNDROME: ICD-10-CM

## 2025-05-29 DIAGNOSIS — M96.1 POSTLAMINECTOMY SYNDROME, NOT ELSEWHERE CLASSIFIED: ICD-10-CM

## 2025-05-29 DIAGNOSIS — M53.3 SACROCOCCYGEAL DISORDERS, NOT ELSEWHERE CLASSIFIED: ICD-10-CM

## 2025-05-29 DIAGNOSIS — M47.817 SPONDYLOSIS W/OUT MYELOPATHY OR RADICULOPATHY, LUMBOSACRAL REGION: ICD-10-CM

## 2025-05-29 PROCEDURE — 99214 OFFICE O/P EST MOD 30 MIN: CPT | Mod: 95

## 2025-05-29 PROCEDURE — G2211 COMPLEX E/M VISIT ADD ON: CPT | Mod: 2W

## 2025-05-30 ENCOUNTER — NON-APPOINTMENT (OUTPATIENT)
Age: 60
End: 2025-05-30

## 2025-05-30 RX ORDER — SULFAMETHOXAZOLE AND TRIMETHOPRIM 800; 160 MG/1; MG/1
800-160 TABLET ORAL
Qty: 14 | Refills: 0 | Status: ACTIVE | COMMUNITY
Start: 2025-05-30 | End: 1900-01-01

## 2025-06-05 ENCOUNTER — APPOINTMENT (OUTPATIENT)
Dept: UROLOGY | Facility: CLINIC | Age: 60
End: 2025-06-05

## 2025-06-09 ENCOUNTER — RESULT REVIEW (OUTPATIENT)
Age: 60
End: 2025-06-09

## 2025-06-12 ENCOUNTER — APPOINTMENT (OUTPATIENT)
Dept: UROLOGY | Facility: CLINIC | Age: 60
End: 2025-06-12

## 2025-06-13 ENCOUNTER — NON-APPOINTMENT (OUTPATIENT)
Age: 60
End: 2025-06-13

## 2025-06-19 ENCOUNTER — APPOINTMENT (OUTPATIENT)
Dept: UROLOGY | Facility: CLINIC | Age: 60
End: 2025-06-19
Payer: COMMERCIAL

## 2025-06-19 VITALS — BODY MASS INDEX: 23.57 KG/M2 | WEIGHT: 133 LBS | HEIGHT: 63 IN

## 2025-06-19 PROCEDURE — 99214 OFFICE O/P EST MOD 30 MIN: CPT

## 2025-06-19 RX ORDER — SULFAMETHOXAZOLE AND TRIMETHOPRIM 800; 160 MG/1; MG/1
800-160 TABLET ORAL TWICE DAILY
Qty: 14 | Refills: 1 | Status: ACTIVE | COMMUNITY
Start: 2025-06-19 | End: 1900-01-01

## 2025-06-19 RX ORDER — METHENAMINE HIPPURATE 1 G/1
1 TABLET ORAL
Qty: 180 | Refills: 3 | Status: ACTIVE | COMMUNITY
Start: 2025-06-19 | End: 1900-01-01

## 2025-06-24 ENCOUNTER — APPOINTMENT (OUTPATIENT)
Dept: UROLOGY | Facility: CLINIC | Age: 60
End: 2025-06-24

## 2025-06-24 ENCOUNTER — NON-APPOINTMENT (OUTPATIENT)
Age: 60
End: 2025-06-24

## 2025-07-09 ENCOUNTER — APPOINTMENT (OUTPATIENT)
Dept: BARIATRICS/WEIGHT MGMT | Facility: CLINIC | Age: 60
End: 2025-07-09
Payer: COMMERCIAL

## 2025-07-09 PROCEDURE — 99213 OFFICE O/P EST LOW 20 MIN: CPT | Mod: 95

## (undated) DEVICE — SUT STRATAFIX SYMMETRIC PDS 1 45CM OS-6

## (undated) DEVICE — VENODYNE/SCD SLEEVE CALF MEDIUM

## (undated) DEVICE — DRSG GAUZE MOISTURIZER 0.5 OZ 4X8

## (undated) DEVICE — WARMING BLANKET UPPER ADULT

## (undated) DEVICE — SUT VICRYL 2-0 27" CT-1 UNDYED

## (undated) DEVICE — DRAPE BACK TABLE COVER 80X90"

## (undated) DEVICE — MIDAS REX MR8 BALL FLUTED LG BORE 4MM X 14CM

## (undated) DEVICE — IRR SYS BONE CLNNG INTERPULSE

## (undated) DEVICE — MIDAS REX MR8 MATCH HEAD FLUTED LG BORE 3MM X 14CM

## (undated) DEVICE — MARKING PEN W RULER

## (undated) DEVICE — PUNCH FOR 3.5MM PUSHLOC

## (undated) DEVICE — DRAPE 3/4 SHEET 52X76"

## (undated) DEVICE — GOWN TRIMAX XXL

## (undated) DEVICE — PREP CHLOROHEXIDINE 4% 118CC KIT

## (undated) DEVICE — TUBING LINVATEC ARTHROSCOPY IN/OUTFLOW

## (undated) DEVICE — CANNULA ARTHREX PASSPORT BUTTON 10MMX3CM

## (undated) DEVICE — SUT MONOCRYL 3-0 18" PS-1

## (undated) DEVICE — ELCTR AQUAMANTYS BIPOLAR SEALER 6.0

## (undated) DEVICE — ARTHREX ARTHROSCOPY TUBING

## (undated) DEVICE — FRAZIER CONNECTING TUBE 2FT 5MM

## (undated) DEVICE — PACK SHOULDER ARTHROSCOPY

## (undated) DEVICE — SOL IRR BAG NS 0.9% 3000ML

## (undated) DEVICE — FOLEY TRAY 14FR 5CC LF UMETER CLOSED

## (undated) DEVICE — SUT QUILL MONODERM 2-0 60CM 24MM UNDYED

## (undated) DEVICE — DRAPE 1/2 SHEET 40X57"

## (undated) DEVICE — WARMING BLANKET LOWER ADULT

## (undated) DEVICE — FRAZIER SUCTION TIP 8FR

## (undated) DEVICE — PACK SPINE

## (undated) DEVICE — DRILL BIT SYNTHES ORTHO QCP GLD 2.5X180MM ST

## (undated) DEVICE — DRILL BIT SYNTHES ORTHO QCP 3.2X195MM ST

## (undated) DEVICE — DRSG BIOPATCH DISK W CHG 1" W 4.0MM HOLE

## (undated) DEVICE — CANNULA ARTHREX TWIST IN NO SQUIRT CAP 7X7 PURPLE

## (undated) DEVICE — MIDAS REX MR8 BALL FLUTED LG BORE 5MM X 14CM

## (undated) DEVICE — GLV 9 PROTEXIS ORTHO (BROWN)

## (undated) DEVICE — STRYKER BONE MILL BLADE MEDIUM 5.0MM

## (undated) DEVICE — SUCTION YANKAUER NO CONTROL VENT

## (undated) DEVICE — DRSG TEGADERM 4X4.75"

## (undated) DEVICE — GLV 7.5 PROTEXIS (WHITE)

## (undated) DEVICE — STAPLER SKIN PROXIMATE

## (undated) DEVICE — SUT VICRYL 2-0 27" SH UNDYED

## (undated) DEVICE — PREP DURAPREP 26CC

## (undated) DEVICE — SUT VICRYL 1 36" CTB-1 UNDYED

## (undated) DEVICE — DRAIN JACKSON PRATT 3 SPRING RESERVOIR W 10FR PVC DRAIN

## (undated) DEVICE — GLV 8 PROTEXIS (WHITE)

## (undated) DEVICE — NDL SPINAL 18G X 3.5" (PINK)

## (undated) DEVICE — Device